# Patient Record
Sex: MALE | Race: WHITE | Employment: OTHER | URBAN - METROPOLITAN AREA
[De-identification: names, ages, dates, MRNs, and addresses within clinical notes are randomized per-mention and may not be internally consistent; named-entity substitution may affect disease eponyms.]

---

## 2017-01-05 NOTE — PERIOP NOTES
[facility]  Preoperative Instructions        Surgery Date 1/6/17          Time of Arrival 1000    1. On the day of your surgery, please report to the Surgical Services Registration Desk and sign in at your designated time. The Surgery Center is located to the right of the Emergency Room. 2. You must have someone with you to drive you home. You should not drive a car for 24 hours following surgery. Please make arrangements for a friend or family member to stay with you for the first 24 hours after your surgery. 3. Do not have anything to eat or drink (including water, gum, mints, coffee, juice) after midnight n/a              . This may not apply to medications prescribed by your physician. Please note special instructions, if applicable. If you are currently taking Plavix, Coumadin, or other blood-thinning agents, contact your surgeon for instructions. 4. We recommend you do not drink any alcoholic beverages for 24 hours before and after your surgery. 5. Have a list of all current medications, including vitamins, herbal supplements and any other over the counter medications. Stop all Aspirin and non-steroidal anti-inflammatory drugs (I.e. Advil, Aleve), as directed by your surgeon's office. Stop all vitamins and herbal supplements seven days prior to your surgery. 6. Wear comfortable clothes. Wear glasses instead of contacts. Do not bring any money or jewelry. Please bring picture ID, insurance card, and any prearranged co-payment or hospital payment. Do not wear make-up, particularly mascara the morning of your surgery. Do not wear nail polish, particularly if you are having foot /hand surgery. Wear your hair loose or down, no ponytails, buns, trevon pins or clips. All body piercings must be removed.   Please shower with antibacterial soap for three consecutive days before and on the morning of surgery, but do not apply any lotions, powders or deodorants after the shower on the day of surgery. Please use a fresh towels after each shower. Please sleep in clean clothes and change bed linens the night before surgery. Please do not shave for 48 hours prior to surgery. Shaving of the face is acceptable. 7. You should understand that if you do not follow these instructions your surgery may be cancelled. If your physical condition changes (I.e. fever, cold or flu) please contact your surgeon as soon as possible. 8. It is important that you be on time. If a situation occurs where you may be late, please call [phone] (OR Holding Area). 9. If you have any questions and or problems, please call [phone] (Pre-admission Testing). 10. Your surgery time may be subject to change. You will receive a phone call the evening prior if your time changes. 11.  If having outpatient surgery, you must have someone to drive you here, stay with you during the duration of your stay, and to drive you home at time of discharge. Special Instructions:    MEDICATIONS TO TAKE THE MORNING OF SURGERY WITH A SIP OF WATER:meds per routine w light breakfast      I understand a pre-operative phone call will be made to verify my surgery time. In the event that I am not available, I give permission for a message to be left on my answering service and/or with another person?   Yes @ [phone]         ___________________      __________   _________    (Signature of Patient)             (Witness)                (Date and Time)

## 2017-01-06 ENCOUNTER — HOSPITAL ENCOUNTER (OUTPATIENT)
Age: 77
Setting detail: OUTPATIENT SURGERY
Discharge: HOME | End: 2017-01-06
Attending: ORTHOPAEDIC SURGERY | Admitting: ORTHOPAEDIC SURGERY
Payer: MEDICARE

## 2017-01-06 VITALS
SYSTOLIC BLOOD PRESSURE: 159 MMHG | TEMPERATURE: 98 F | WEIGHT: 180.34 LBS | HEIGHT: 71 IN | DIASTOLIC BLOOD PRESSURE: 63 MMHG | OXYGEN SATURATION: 99 % | RESPIRATION RATE: 16 BRPM | BODY MASS INDEX: 25.25 KG/M2 | HEART RATE: 72 BPM

## 2017-01-06 PROCEDURE — 77030002916 HC SUT ETHLN J&J -A: Performed by: ORTHOPAEDIC SURGERY

## 2017-01-06 PROCEDURE — 77030003028 HC SUT VCRL J&J -A: Performed by: ORTHOPAEDIC SURGERY

## 2017-01-06 PROCEDURE — 77030018836 HC SOL IRR NACL ICUM -A: Performed by: ORTHOPAEDIC SURGERY

## 2017-01-06 PROCEDURE — 76210000020 HC REC RM PH II FIRST 0.5 HR: Performed by: ORTHOPAEDIC SURGERY

## 2017-01-06 PROCEDURE — 77030006689 HC BLD OPHTH BVR BD -A: Performed by: ORTHOPAEDIC SURGERY

## 2017-01-06 PROCEDURE — 76010000154 HC OR TIME FIRST 0.5 HR: Performed by: ORTHOPAEDIC SURGERY

## 2017-01-06 PROCEDURE — 74011000250 HC RX REV CODE- 250: Performed by: ORTHOPAEDIC SURGERY

## 2017-01-06 PROCEDURE — 77030032490 HC SLV COMPR SCD KNE COVD -B: Performed by: ORTHOPAEDIC SURGERY

## 2017-01-06 RX ORDER — HYDROCODONE BITARTRATE AND ACETAMINOPHEN 5; 325 MG/1; MG/1
1 TABLET ORAL
Qty: 15 TAB | Refills: 0 | Status: SHIPPED | OUTPATIENT
Start: 2017-01-06 | End: 2017-02-23 | Stop reason: ALTCHOICE

## 2017-01-06 NOTE — OP NOTES
[address]   OP NOTE       Name:  Augusta Zarco   MR#:  [id number]   :  1940   Account #:  [de-identified]    Surgery Date:  2017   Date of Adm:  2017       PREOPERATIVE DIAGNOSIS: Left trigger thumb. POSTOPERATIVE DIAGNOSIS: Left trigger thumb. PROCEDURES PERFORMED: Left thumb trigger release. SURGEON: Blake Silva MD    ANESTHESIA: 8 mL total of 1% lidocaine with epinephrine mixed 10:1   with 4% bicarbonate, infiltrated at the surgical site prior to surgery. ESTIMATED BLOOD LOSS: Minimal.    SPECIMENS REMOVED: none    DESCRIPTION OF PROCEDURE: The patient was taken to the   operating room, placed in supine position on the operative table. After   an appropriate time-out confirming the proper patient, procedure and   laterality, the left upper extremity was prepped and draped in standard   sterile fashion. The incision was made over the palmar digital crease of   the thumb. Dissection was taken down to the flexor tendon. The A1   pulley was identified and incised completely. The digital nerves were protected   using Ragnell retractors. There was no significant bleeding during the   case. The wound was irrigated with normal saline. The patient was   able to move the thumb fully through a full range of motion without   triggering. The wound was closed using 5-0 Vicryl Rapide. An   appropriate bandage was applied. COMPLICATIONS: None. CONDITION: Stable. MD Elizabeth Cardona / Belkys Mornoe   D:  2017   11:38   T:  2017   12:22   Job #:  [id number]

## 2017-01-06 NOTE — DISCHARGE INSTRUCTIONS
DISCHARGE SUMMARY from Nurse    The following personal items are in your possession at time of discharge:    Dental Appliances: None  Visual Aid: Glasses (reading)  Hearing Aids/Status: Does not own  Home Medications: None  Jewelry: Necklace  Clothing: Pants, Shirt, Undergarments, Socks, Footwear  Other Valuables: None  Personal Items Sent to Safe: declined          PATIENT INSTRUCTIONS:    After general anesthesia or intravenous sedation, for 24 hours or while taking prescription Narcotics:  · Limit your activities  · Do not drive and operate hazardous machinery  · Do not make important personal or business decisions  · Do  not drink alcoholic beverages  · If you have not urinated within 8 hours after discharge, please contact your surgeon on call. Report the following to your surgeon:  · Excessive pain, swelling, redness or odor of or around the surgical area  · Temperature over 100.5  · Nausea and vomiting lasting longer than 4 hours or if unable to take medications  · Any signs of decreased circulation or nerve impairment to extremity: change in color, persistent  numbness, tingling, coldness or increase pain  · Any questions        What to do at Home:    *  Please give a list of your current medications to your Primary Care Provider. *  Please update this list whenever your medications are discontinued, doses are      changed, or new medications (including over-the-counter products) are added. *  Please carry medication information at all times in case of emergency situations. These are general instructions for a healthy lifestyle:    No smoking/ No tobacco products/ Avoid exposure to second hand smoke    Surgeon General's Warning:  Quitting smoking now greatly reduces serious risk to your health.     Obesity, smoking, and sedentary lifestyle greatly increases your risk for illness    A healthy diet, regular physical exercise & weight monitoring are important for maintaining a healthy lifestyle    You may be retaining fluid if you have a history of heart failure or if you experience any of the following symptoms:  Weight gain of 3 pounds or more overnight or 5 pounds in a week, increased swelling in our hands or feet or shortness of breath while lying flat in bed. Please call your doctor as soon as you notice any of these symptoms; do not wait until your next office visit. Recognize signs and symptoms of STROKE:    F-face looks uneven    A-arms unable to move or move unevenly    S-speech slurred or non-existent    T-time-call 911 as soon as signs and symptoms begin-DO NOT go       Back to bed or wait to see if you get better-TIME IS BRAIN. Warning Signs of HEART ATTACK     Call 911 if you have these symptoms:   Chest discomfort. Most heart attacks involve discomfort in the center of the chest that lasts more than a few minutes, or that goes away and comes back. It can feel like uncomfortable pressure, squeezing, fullness, or pain.  Discomfort in other areas of the upper body. Symptoms can include pain or discomfort in one or both arms, the back, neck, jaw, or stomach.  Shortness of breath with or without chest discomfort.  Other signs may include breaking out in a cold sweat, nausea, or lightheadedness. Don't wait more than five minutes to call 911 - MINUTES MATTER! Fast action can save your life. Calling 911 is almost always the fastest way to get lifesaving treatment. Emergency Medical Services staff can begin treatment when they arrive -- up to an hour sooner than if someone gets to the hospital by car. The discharge information has been reviewed with the patient and caregiver. The patient and caregiver verbalized understanding. Discharge medications reviewed with the patient and caregiver and appropriate educational materials and side effects teaching were provided. A common side effect of anesthesia following surgery is nausea and/or vomiting.  In order to decrease symptoms, it is wise to avoid foods that are high in fat, greasy foods, milk products, and spicy foods for the first 24 hours. Acceptable foods for the first 24 hours following surgery include but are not limited to:     soup   broth    toast    crackers    applesauce    bananas    mashed potatoes,   soft or scrambled eggs   oatmeal    jello    It is important to eat when taking your pain medication. This will help to prevent nausea. If possible, please try to time your meals with your medications. It is very important to stay hydrated following surgery. Sip fluids frequently while awake. Avoid acidic drinks such as citrus juices and soda for 24 hours. Carbonated beverages may cause bloating and gas. Acceptable fluids include:    - water (flavor packets may add variety)  - coffee or tea (in moderation)  - Gatorade  - Curtis-aid  - apple juice  - cranberry juice    You are encouraged to cough and deep breathe every hour when awake. This will help to prevent respiratory complications following anesthesia. You may want to hug a pillow when coughing and sneezing to add additional support to the surgical area and to decrease discomfort if you had abdominal or chest surgery. If you are discharged home with support stockings, you may remove them after 24 hours. Support stockings are used to help prevent blood clots in the legs following surgery. Please take time to review all of your Home Care Instructions and Medication Information sheets provided in your discharge packet. If you have any questions, please contact your surgeons office. Thank you. Hydrocodone/Acetaminophen (By mouth)   Acetaminophen (a-acsb-a-MIN-oh-fen), Hydrocodone Bitartrate (bzu-fvkk-QKK-done bye-TAR-trate)  Treats pain. This medicine contains a narcotic pain reliever.    Brand Name(s):Hycet, Lorcet, Lorcet HD, Lorcet Plus, Lortab 10/325, Lortab 5/325, Lortab 7.5/325, Lortab Elixir, Norco, Verdrocet, Vicodin, Vicodin ES, Vicodin HP, [facility] Clause 5/300   There may be other brand names for this medicine. When This Medicine Should Not Be Used: This medicine is not right for everyone. Do not use it if you had an allergic reaction to acetaminophen, hydrocodone, or other narcotic medicines. How to Use This Medicine:   Tablet, Liquid, Capsule  · Your doctor will tell you how much medicine to use. Do not use more than directed. · Measure the oral liquid medicine with a marked measuring spoon, oral syringe, or medicine cup. · Drink plenty of liquids to help avoid constipation. · Missed dose: Take a dose as soon as you remember. If it is almost time for your next dose, wait until then and take a regular dose. Do not take extra medicine to make up for a missed dose. · Store the medicine in a closed container at room temperature, away from heat, moisture, and direct light. Drugs and Foods to Avoid:   Ask your doctor or pharmacist before using any other medicine, including over-the-counter medicines, vitamins, and herbal products. · Some medicines can affect how hydrocodone/acetaminophen works. Tell your doctor if you are using any of the following:   ¨ An MAO inhibitor  ¨ Medicine to treat depression  · This medicine can intensify the effects of alcohol, sedatives, or tranquilizers. Tell your doctor if you drink alcohol or if you are using any medicine that makes you sleepy, such as allergy medicine or another narcotic pain medicine. Acetaminophen can damage your liver, and your risk is higher if you also drink alcohol. Warnings While Using This Medicine:   · Tell your doctor if you are pregnant or breastfeeding, or if you have lung disease, liver disease, kidney disease, problems urinating, an underactive thyroid, [place] disease, prostate problems, stomach problems, or a history of head injury or brain tumor.   · This medicine may cause the following problems:   ¨ Liver problems  ¨ Serious skin reactions  · This medicine can be habit-forming. Do not use more than your prescribed dose. Call your doctor if you think your medicine is not working. · This medicine may make you dizzy or drowsy. Do not drive or doing anything else that could be dangerous until you know how this medicine affects you. · Too much of this medicine can cause death. Symptoms of an overdose include extreme dizziness or weakness, trouble breathing, slow heartbeat, seizure, and cold, clammy skin. · This medicine contains acetaminophen. Read the labels of all other medicines you are using to see if they also contain acetaminophen, or ask your doctor or pharmacist. Brittany Padgett not use more than 4 grams (4,000 milligrams) total of acetaminophen in one day. · Tell any doctor or dentist who treats you that you are using this medicine. This medicine may affect certain medical test results. · This medicine may cause constipation, especially with long-term use. Ask your doctor if you should use a laxative to prevent and treat constipation. · Keep all medicine out of the reach of children. Never share your medicine with anyone. Possible Side Effects While Using This Medicine:   Call your doctor right away if you notice any of these side effects:  · Allergic reaction: Itching or hives, swelling in your face or hands, swelling or tingling in your mouth or throat, chest tightness, trouble breathing  · Blistering, peeling, red skin rash  · Change in how much or how often you urinate  · Dark urine or pale stools, loss of appetite, stomach pain, yellow skin or eyes  · Extreme weakness, shallow breathing, slow heartbeat, sweating, cold or clammy skin  · Lightheadedness, dizziness, fainting  · Unusual bleeding or bruising  If you notice these less serious side effects, talk with your doctor:   · Constipation, nausea, vomiting  · Tiredness or sleepiness  If you notice other side effects that you think are caused by this medicine, tell your doctor.    Call your doctor for medical advice about side effects. You may report side effects to FDA at 5-847-ZEC-0494  © 2016 [address] is for End User's use only and may not be sold, redistributed or otherwise used for commercial purposes. The above information is an  only. It is not intended as medical advice for individual conditions or treatments. Talk to your doctor, nurse or pharmacist before following any medical regimen to see if it is safe and effective for you.

## 2017-01-06 NOTE — PERIOP NOTES
For dc home. Vswnl. Denies pain, nausea. dsg to [facility] hand d&i. Went over [employer] instructions w/pt and wife including Rx and f/up. Verbalized understanding. dc'd home.

## 2017-01-06 NOTE — IP AVS SNAPSHOT
[address] [facility] 
[phone] Patient: Yan Ortiz MRN: [id number] GBD:[id number] You are allergic to the following Allergen Reactions Bee Sting (Sting, Bee) Anaphylaxis Pcn (Penicillins) Swelling Recent Documentation Height Weight BMI Smoking Status 1.803 m 81.8 kg 25.15 kg/m2 Former Smoker Emergency Contacts Name Discharge Info Relation Home Work Mobile [address] [3] Spouse [3] [phone] About your hospitalization You were admitted on:  January 6, 2017 You last received care in the:  [facility] You were discharged on:  January 6, 2017 Unit phone number:  [phone] Why you were hospitalized Your primary diagnosis was:  Not on File Providers Seen During Your Hospitalizations Provider Role Specialty Primary office phone Corin Diallo MD Attending Provider Orthopedic Surgery [phone] Your Primary Care Physician (PCP) Primary Care Physician Office Phone Office Fax [address] [facility], [facility] [address] [phone] Follow-up Information Follow up With Details Comments Contact Info Mattie Akhtar MD   [address] [facility] [place] [address] [phone] Current Discharge Medication List  
  
START taking these medications Dose & Instructions Dispensing Information Comments Morning Noon Evening Bedtime HYDROcodone-acetaminophen 5-325 mg per tablet Commonly known as:  Ana Maria Arts Your next dose is: Today, Tomorrow Other:  _________ Dose:  1 Tab Take 1 Tab by mouth every four (4) hours as needed for Pain. Max Daily Amount: 6 Tabs. Quantity:  15 Tab Refills:  0 CONTINUE these medications which have NOT CHANGED Dose & Instructions Dispensing Information Comments Morning Noon Evening Bedtime  
 allopurinol 300 mg tablet Commonly known as:  Helane Sizer Your next dose is: Today, Tomorrow Other:  _________ Dose:  300 mg Take 1 Tab by mouth daily. Quantity:  90 Tab Refills:  1  
     
   
   
   
  
 amLODIPine 10 mg tablet Commonly known as:  Lovilia Blade Your next dose is: Today, Tomorrow Other:  _________ One tablet daily Quantity:  90 Tab Refills:  3  
     
   
   
   
  
 aspirin delayed-release 81 mg tablet Your next dose is: Today, Tomorrow Other:  _________ Dose:  81 mg Take 81 mg by mouth daily. Refills:  0  
     
   
   
   
  
 atorvastatin 10 mg tablet Commonly known as:  LIPITOR Your next dose is: Today, Tomorrow Other:  _________ Dose:  10 mg Take 1 Tab by mouth daily. Quantity:  90 Tab Refills:  3  
     
   
   
   
  
 benazepril 40 mg tablet Commonly known as:  LOTENSIN Your next dose is: Today, Tomorrow Other:  _________ TAKE 1 TABLET DAILY Quantity:  90 Tab Refills:  3  
     
   
   
   
  
 hydroCHLOROthiazide 12.5 mg capsule Commonly known as:  Leita Grade Your next dose is: Today, Tomorrow Other:  _________ Dose:  12.5 mg Take 1 Cap by mouth daily. Quantity:  90 Cap Refills:  3  
     
   
   
   
  
 levothyroxine 75 mcg tablet Commonly known as:  SYNTHROID Your next dose is: Today, Tomorrow Other:  _________ Dose:  75 mcg Take 1 Tab by mouth Daily (before breakfast). For thyroid Quantity:  90 Tab Refills:  3  
     
   
   
   
  
 metoprolol succinate 50 mg XL tablet Commonly known as:  TOPROL-XL Your next dose is: Today, Tomorrow Other:  _________ Dose:  50 mg Take 50 mg by mouth two (2) times a day. Refills:  0 Where to Get Your Medications Information on where to get these meds will be given to you by the nurse or doctor. ! Ask your nurse or doctor about these medications HYDROcodone-acetaminophen 5-325 mg per tablet Discharge Instructions DISCHARGE SUMMARY from Nurse The following personal items are in your possession at time of discharge: 
 
Dental Appliances: None Visual Aid: Glasses (reading) Hearing Aids/Status: Does not own Home Medications: None Jewelry: Yaya Amour Clothing: Pants, Shirt, Undergarments, Socks, Footwear Other Valuables: None Personal Items Sent to Safe: declined PATIENT INSTRUCTIONS: 
 
After general anesthesia or intravenous sedation, for 24 hours or while taking prescription Narcotics: · Limit your activities · Do not drive and operate hazardous machinery · Do not make important personal or business decisions · Do  not drink alcoholic beverages · If you have not urinated within 8 hours after discharge, please contact your surgeon on call. Report the following to your surgeon: 
· Excessive pain, swelling, redness or odor of or around the surgical area · Temperature over 100.5 · Nausea and vomiting lasting longer than 4 hours or if unable to take medications · Any signs of decreased circulation or nerve impairment to extremity: change in color, persistent  numbness, tingling, coldness or increase pain · Any questions What to do at Home: *  Please give a list of your current medications to your Primary Care Provider. *  Please update this list whenever your medications are discontinued, doses are 
    changed, or new medications (including over-the-counter products) are added. *  Please carry medication information at all times in case of emergency situations. These are general instructions for a healthy lifestyle: No smoking/ No tobacco products/ Avoid exposure to second hand smoke Surgeon General's Warning:  Quitting smoking now greatly reduces serious risk to your health. Obesity, smoking, and sedentary lifestyle greatly increases your risk for illness A healthy diet, regular physical exercise & weight monitoring are important for maintaining a healthy lifestyle You may be retaining fluid if you have a history of heart failure or if you experience any of the following symptoms:  Weight gain of 3 pounds or more overnight or 5 pounds in a week, increased swelling in our hands or feet or shortness of breath while lying flat in bed. Please call your doctor as soon as you notice any of these symptoms; do not wait until your next office visit. Recognize signs and symptoms of STROKE: 
 
F-face looks uneven A-arms unable to move or move unevenly S-speech slurred or non-existent T-time-call 911 as soon as signs and symptoms begin-DO NOT go Back to bed or wait to see if you get better-TIME IS BRAIN. Warning Signs of HEART ATTACK Call 911 if you have these symptoms: 
? Chest discomfort. Most heart attacks involve discomfort in the center of the chest that lasts more than a few minutes, or that goes away and comes back. It can feel like uncomfortable pressure, squeezing, fullness, or pain. ? Discomfort in other areas of the upper body. Symptoms can include pain or discomfort in one or both arms, the back, neck, jaw, or stomach. ? Shortness of breath with or without chest discomfort. ? Other signs may include breaking out in a cold sweat, nausea, or lightheadedness. Don't wait more than five minutes to call [address]! Fast action can save your life. Calling 911 is almost always the fastest way to get lifesaving treatment. Emergency Medical Services staff can begin treatment when they arrive  up to an hour sooner than if someone gets to the hospital by car.   
 
 
The discharge information has been reviewed with the patient and caregiver. The patient and caregiver verbalized understanding. Discharge medications reviewed with the patient and caregiver and appropriate educational materials and side effects teaching were provided. A common side effect of anesthesia following surgery is nausea and/or vomiting. In order to decrease symptoms, it is wise to avoid foods that are high in fat, greasy foods, milk products, and spicy foods for the first 24 hours. Acceptable foods for the first 24 hours following surgery include but are not limited to: 
 
? soup 
? broth 
?  toast  
? crackers ? applesauce 
? bananas  
? mashed potatoes, 
? soft or scrambled eggs 
? oatmeal 
?  jello It is important to eat when taking your pain medication. This will help to prevent nausea. If possible, please try to time your meals with your medications. It is very important to stay hydrated following surgery. Sip fluids frequently while awake. Avoid acidic drinks such as citrus juices and soda for 24 hours. Carbonated beverages may cause bloating and gas. Acceptable fluids include: 
 
? water (flavor packets may add variety) ? coffee or tea (in moderation) ? Gatorade ? Nava Batty ? apple juice 
? cranberry juice You are encouraged to cough and deep breathe every hour when awake. This will help to prevent respiratory complications following anesthesia. You may want to hug a pillow when coughing and sneezing to add additional support to the surgical area and to decrease discomfort if you had abdominal or chest surgery. If you are discharged home with support stockings, you may remove them after 24 hours. Support stockings are used to help prevent blood clots in the legs following surgery. Please take time to review all of your Home Care Instructions and Medication Information sheets provided in your discharge packet. If you have any questions, please contact your surgeons office. Thank you. Hydrocodone/Acetaminophen (By mouth) Acetaminophen (k-sdwi-i-MIN-oh-fen), Hydrocodone Bitartrate (znd-yebc-EWL-done bye-TAR-trate) Treats pain. This medicine contains a narcotic pain reliever. Brand Name(s):Hycet, Lorcet, Lorcet HD, Lorcet Plus, Lortab 10/325, Lortab 5/325, Lortab 7.5/325, Lortab Elixir, Norco, Verdrocet, Vicodin, Vicodin ES, Vicodin HP, [place] Punt 5/300 There may be other brand names for this medicine. When This Medicine Should Not Be Used: This medicine is not right for everyone. Do not use it if you had an allergic reaction to acetaminophen, hydrocodone, or other narcotic medicines. How to Use This Medicine:  
Tablet, Liquid, Capsule · Your doctor will tell you how much medicine to use. Do not use more than directed. · Measure the oral liquid medicine with a marked measuring spoon, oral syringe, or medicine cup. · Drink plenty of liquids to help avoid constipation. · Missed dose: Take a dose as soon as you remember. If it is almost time for your next dose, wait until then and take a regular dose. Do not take extra medicine to make up for a missed dose. · Store the medicine in a closed container at room temperature, away from heat, moisture, and direct light. Drugs and Foods to Avoid: Ask your doctor or pharmacist before using any other medicine, including over-the-counter medicines, vitamins, and herbal products. · Some medicines can affect how hydrocodone/acetaminophen works. Tell your doctor if you are using any of the following: ¨ An MAO inhibitor ¨ Medicine to treat depression · This medicine can intensify the effects of alcohol, sedatives, or tranquilizers. Tell your doctor if you drink alcohol or if you are using any medicine that makes you sleepy, such as allergy medicine or another narcotic pain medicine. Acetaminophen can damage your liver, and your risk is higher if you also drink alcohol. Warnings While Using This Medicine: · Tell your doctor if you are pregnant or breastfeeding, or if you have lung disease, liver disease, kidney disease, problems urinating, an underactive thyroid, Rui disease, prostate problems, stomach problems, or a history of head injury or brain tumor. · This medicine may cause the following problems:  
¨ Liver problems ¨ Serious skin reactions · This medicine can be habit-forming. Do not use more than your prescribed dose. Call your doctor if you think your medicine is not working. · This medicine may make you dizzy or drowsy. Do not drive or doing anything else that could be dangerous until you know how this medicine affects you. · Too much of this medicine can cause death. Symptoms of an overdose include extreme dizziness or weakness, trouble breathing, slow heartbeat, seizure, and cold, clammy skin. · This medicine contains acetaminophen. Read the labels of all other medicines you are using to see if they also contain acetaminophen, or ask your doctor or pharmacist. Gabbyra Savers not use more than 4 grams (4,000 milligrams) total of acetaminophen in one day. · Tell any doctor or dentist who treats you that you are using this medicine. This medicine may affect certain medical test results. · This medicine may cause constipation, especially with long-term use. Ask your doctor if you should use a laxative to prevent and treat constipation. · Keep all medicine out of the reach of children. Never share your medicine with anyone. Possible Side Effects While Using This Medicine:  
Call your doctor right away if you notice any of these side effects: · Allergic reaction: Itching or hives, swelling in your face or hands, swelling or tingling in your mouth or throat, chest tightness, trouble breathing · Blistering, peeling, red skin rash · Change in how much or how often you urinate · Dark urine or pale stools, loss of appetite, stomach pain, yellow skin or eyes · Extreme weakness, shallow breathing, slow heartbeat, sweating, cold or clammy skin · Lightheadedness, dizziness, fainting · Unusual bleeding or bruising If you notice these less serious side effects, talk with your doctor: · Constipation, nausea, vomiting · Tiredness or sleepiness If you notice other side effects that you think are caused by this medicine, tell your doctor. Call your doctor for medical advice about side effects. You may report side effects to FDA at 9-139-VTA-7628 © 2016 [address] is for End User's use only and may not be sold, redistributed or otherwise used for commercial purposes. The above information is an  only. It is not intended as medical advice for individual conditions or treatments. Talk to your doctor, nurse or pharmacist before following any medical regimen to see if it is safe and effective for you. Discharge Orders None [facility]! Dear Trudy Madden: 
Thank you for requesting a [employer] account. Our records indicate that you already have an active [employer] account. You can access your account anytime at [email or web address] Did you know that you can access your hospital and ER discharge instructions at any time in [employer]? You can also review all of your test results from your hospital stay or ER visit. Additional Information If you have questions, please visit the Frequently Asked Questions section of the [employer] website at [email or web address] Remember, [employer] is NOT to be used for urgent needs. For medical emergencies, dial 911. Now available from your iPhone and Android! General Information Please provide this summary of care documentation to your next provider. Patient Signature:  ____________________________________________________________ Date:  ____________________________________________________________  
  
Heriberto Artist  Provider Signature: ____________________________________________________________ Date:  ____________________________________________________________

## 2017-01-06 NOTE — IP AVS SNAPSHOT
Current Discharge Medication List  
  
Take these medications at their scheduled times Dose & Instructions Dispensing Information Comments Morning Noon Evening Bedtime  
 allopurinol 300 mg tablet Commonly known as:  Ana Zhong Your next dose is: Today, Tomorrow Other:  ____________ Dose:  300 mg Take 1 Tab by mouth daily. Quantity:  90 Tab Refills:  1  
     
   
   
   
  
 aspirin delayed-release 81 mg tablet Your next dose is: Today, Tomorrow Other:  ____________ Dose:  81 mg Take 81 mg by mouth daily. Refills:  0  
     
   
   
   
  
 atorvastatin 10 mg tablet Commonly known as:  LIPITOR Your next dose is: Today, Tomorrow Other:  ____________ Dose:  10 mg Take 1 Tab by mouth daily. Quantity:  90 Tab Refills:  3  
     
   
   
   
  
 hydroCHLOROthiazide 12.5 mg capsule Commonly known as:  Alesia Helder Your next dose is: Today, Tomorrow Other:  ____________ Dose:  12.5 mg Take 1 Cap by mouth daily. Quantity:  90 Cap Refills:  3  
     
   
   
   
  
 levothyroxine 75 mcg tablet Commonly known as:  SYNTHROID Your next dose is: Today, Tomorrow Other:  ____________ Dose:  75 mcg Take 1 Tab by mouth Daily (before breakfast). For thyroid Quantity:  90 Tab Refills:  3  
     
   
   
   
  
 metoprolol succinate 50 mg XL tablet Commonly known as:  TOPROL-XL Your next dose is: Today, Tomorrow Other:  ____________ Dose:  50 mg Take 50 mg by mouth two (2) times a day. Refills:  0 Take these medications as needed Dose & Instructions Dispensing Information Comments Morning Noon Evening Bedtime HYDROcodone-acetaminophen 5-325 mg per tablet Commonly known as:  Low Punt Your next dose is: Today, Tomorrow Other:  ____________ Dose:  1 Tab Take 1 Tab by mouth every four (4) hours as needed for Pain. Max Daily Amount: 6 Tabs. Quantity:  15 Tab Refills:  0 Take these medications as directed Dose & Instructions Dispensing Information Comments Morning Noon Evening Bedtime  
 amLODIPine 10 mg tablet Commonly known as:  Jeanette Gómez Your next dose is: Today, Tomorrow Other:  ____________ One tablet daily Quantity:  90 Tab Refills:  3  
     
   
   
   
  
 benazepril 40 mg tablet Commonly known as:  LOTENSIN Your next dose is: Today, Tomorrow Other:  ____________ TAKE 1 TABLET DAILY Quantity:  90 Tab Refills:  3 Where to Get Your Medications Information about where to get these medications is not yet available ! Ask your nurse or doctor about these medications HYDROcodone-acetaminophen 5-325 mg per tablet

## 2017-01-24 RX ORDER — ALLOPURINOL 300 MG/1
300 TABLET ORAL DAILY
Qty: 30 TAB | Refills: 0 | Status: SHIPPED | OUTPATIENT
Start: 2017-01-24 | End: 2017-01-24 | Stop reason: SDUPTHER

## 2017-01-24 RX ORDER — ALLOPURINOL 300 MG/1
300 TABLET ORAL DAILY
Qty: 90 TAB | Refills: 2 | Status: SHIPPED | OUTPATIENT
Start: 2017-01-24 | End: 2017-08-09 | Stop reason: SDUPTHER

## 2017-02-13 ENCOUNTER — TELEPHONE (OUTPATIENT)
Dept: ENDOCRINOLOGY | Age: 77
End: 2017-02-13

## 2017-02-13 ENCOUNTER — LAB ONLY (OUTPATIENT)
Dept: ENDOCRINOLOGY | Age: 77
End: 2017-02-13

## 2017-02-13 DIAGNOSIS — E03.4 HYPOTHYROIDISM DUE TO ACQUIRED ATROPHY OF THYROID: Primary | ICD-10-CM

## 2017-02-13 NOTE — TELEPHONE ENCOUNTER
----- Message from Atif Pride MD sent at 2/13/2017  7:40 AM EST -----  Regarding: lab only appointment  Mr. Tamanna Zamarripa will be coming in today around 21 [phone] for a labs. He will need TSH, Free T4 and Total T3.     Thanks,    Mygurvinder Roles

## 2017-02-14 LAB
T3 SERPL-MCNC: 108 NG/DL (ref 71–180)
T4 FREE SERPL-MCNC: 1.43 NG/DL (ref 0.82–1.77)
TSH SERPL DL<=0.005 MIU/L-ACNC: 0.46 UIU/ML (ref 0.45–4.5)

## 2017-02-23 ENCOUNTER — OFFICE VISIT (OUTPATIENT)
Dept: INTERNAL MEDICINE CLINIC | Age: 77
End: 2017-02-23

## 2017-02-23 ENCOUNTER — OFFICE VISIT (OUTPATIENT)
Dept: ENDOCRINOLOGY | Age: 77
End: 2017-02-23

## 2017-02-23 VITALS
OXYGEN SATURATION: 96 % | HEART RATE: 70 BPM | RESPIRATION RATE: 14 BRPM | HEIGHT: 71 IN | TEMPERATURE: 97.3 F | DIASTOLIC BLOOD PRESSURE: 59 MMHG | WEIGHT: 182 LBS | BODY MASS INDEX: 25.48 KG/M2 | SYSTOLIC BLOOD PRESSURE: 137 MMHG

## 2017-02-23 VITALS
SYSTOLIC BLOOD PRESSURE: 130 MMHG | BODY MASS INDEX: 25.03 KG/M2 | HEART RATE: 58 BPM | WEIGHT: 178.8 LBS | HEIGHT: 71 IN | DIASTOLIC BLOOD PRESSURE: 60 MMHG

## 2017-02-23 DIAGNOSIS — I10 ESSENTIAL HYPERTENSION: Primary | ICD-10-CM

## 2017-02-23 DIAGNOSIS — E03.4 HYPOTHYROIDISM DUE TO ACQUIRED ATROPHY OF THYROID: ICD-10-CM

## 2017-02-23 DIAGNOSIS — R73.02 GLUCOSE INTOLERANCE (IMPAIRED GLUCOSE TOLERANCE): ICD-10-CM

## 2017-02-23 DIAGNOSIS — M10.079 IDIOPATHIC GOUT OF FOOT, UNSPECIFIED CHRONICITY, UNSPECIFIED LATERALITY: ICD-10-CM

## 2017-02-23 DIAGNOSIS — E78.00 HYPERCHOLESTEROLEMIA: ICD-10-CM

## 2017-02-23 DIAGNOSIS — E03.4 HYPOTHYROIDISM DUE TO ACQUIRED ATROPHY OF THYROID: Primary | ICD-10-CM

## 2017-02-23 RX ORDER — ASPIRIN 81 MG/1
81 TABLET ORAL DAILY
Qty: 90 TAB | Refills: 3
Start: 2017-02-23

## 2017-02-23 NOTE — PROGRESS NOTES
Reviewed record In preparation for visit and have obtained necessary documentation. Has info on advanced directive but has not filled them out. 1. Have you been to the ER, urgent care clinic or hospitalized since your last visit? ED [facility] 1/6/17, 9/7/17, 8/31/16    2. Have you seen or consulted any other health care providers outside of the [facility] since your last visit? Include any pap smears or colon screening.  Dr Conley Sandhoff, Dr Kashif Moya, Dr Stephenie Valle, [facility] Domonique Lan Maintenance reviewed:

## 2017-02-23 NOTE — PROGRESS NOTES
Chief Complaint   Patient presents with    Thyroid Problem     pcp and pharmacy verified   Records since last visit reviewed  History of Present Illness: Kel Meredith is a 68 y.o. 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 At his last visit in August 2016 he was clinically euthyroid on LT4 75mcg daily. His TSH was 0.702 with FT4 of 1.30. We continued him on the LT4 75mcg daily. He contacted me in early February with concerns of decreased appetite, soft stools and 5 pounds weight loss. He notes this was the symptoms he had when he was hyperthyroid previously. We checked his TFTs. His TSH was 0.459 with FT4 of 1.43 and TT3 of 108. I told him to continue the LT4 75mcg daily till we could discuss the issue further at today's visit. Pt notes the symptoms lasted about a weeks and then it went away and normalized all of a sudden. His wife notes that she had put him on \"all sugar free, because he is a sweet eater\". He has gained 3 pounds back. Pt notes his energy is good and he is feeling well overall. Pt denies issues of CP, SOB, palpitations, tremors, diarrhea or constipation, heat or cold intolerance. He notes that he saw his cardiologist and after a treadmill stress test, was told his heart is doing well. Pt notes he is back to going out side cutting wood and doing his yard work. A significant portion of the history was obtained by his wife. Current Outpatient Prescriptions   Medication Sig    allopurinol (ZYLOPRIM) 300 mg tablet Take 1 Tab by mouth daily.  atorvastatin (LIPITOR) 10 mg tablet Take 1 Tab by mouth daily.  benazepril (LOTENSIN) 40 mg tablet TAKE 1 TABLET DAILY    hydroCHLOROthiazide (MICROZIDE) 12.5 mg capsule Take 1 Cap by mouth daily.  amLODIPine (NORVASC) 10 mg tablet One tablet daily    levothyroxine (SYNTHROID) 75 mcg tablet Take 1 Tab by mouth Daily (before breakfast).  For thyroid    metoprolol succinate (TOPROL-XL) 50 mg XL tablet Take 50 mg by mouth two (2) times a day.  aspirin delayed-release 81 mg tablet Take 81 mg by mouth daily. No current facility-administered medications for this visit. Allergies   Allergen Reactions    Bee Sting [Sting, Bee] Anaphylaxis    Pcn [Penicillins] Swelling     Review of Systems:  - Cardiovascular: no chest pain  - Neurological: no tremors  - Integumentary: skin is normal    Physical Examination:  Blood pressure 130/60, pulse (!) 58, height 5' 11\" (1.803 m), weight 178 lb 12.8 oz (81.1 kg). - General: pleasant, no distress, good eye contact   - Neck: no thyromegaly or thyroid bruits  - Cardiovascular: regular, normal rate, nl s1 and s2, no m/r/g   - Integumentary: skin is normal, no edema  - Neurological: reflexes 2+ at biceps, no tremors  - Psychiatric: normal mood and affect    Data Reviewed:   Component      Latest Ref Rng & Units 2/13/2017 2/13/2017 2/13/2017          10:19 AM 10:19 AM 10:19 AM   T3, total      71 - 180 ng/dL   108   T4, Free      0.82 - 1.77 ng/dL  1.43    TSH      0.450 - 4.500 uIU/mL 0.459       Assessment/Plan:   1) Hypothyroidism > Pt's symptoms have improved, I suspect he had a GI virus. Will continue the LT4 75mcg daily. RTC 6 months      Follow-up Disposition:  Return in about 6 months (around 8/23/2017).     Copy sent to:  Dr. Geetha Ojeda

## 2017-02-23 NOTE — PATIENT INSTRUCTIONS
Office visit in 6 months. We will do hemoglobin A1c in the office that day. High Blood Pressure: Care Instructions  Your Care Instructions  If your blood pressure is usually above 140/90, you have high blood pressure, or hypertension. That means the top number is 140 or higher or the bottom number is 90 or higher, or both. Despite what a lot of people think, high blood pressure usually doesn't cause headaches or make you feel dizzy or lightheaded. It usually has no symptoms. But it does increase your risk for heart attack, stroke, and kidney or eye damage. The higher your blood pressure, the more your risk increases. Your doctor will give you a goal for your blood pressure. Your goal will be based on your health and your age. An example of a goal is to keep your blood pressure below 140/90. Lifestyle changes, such as eating healthy and being active, are always important to help lower blood pressure. You might also take medicine to reach your blood pressure goal.  Follow-up care is a key part of your treatment and safety. Be sure to make and go to all appointments, and call your doctor if you are having problems. It's also a good idea to know your test results and keep a list of the medicines you take. How can you care for yourself at home? Medical treatment  · If you stop taking your medicine, your blood pressure will go back up. You may take one or more types of medicine to lower your blood pressure. Be safe with medicines. Take your medicine exactly as prescribed. Call your doctor if you think you are having a problem with your medicine. · Talk to your doctor before you start taking aspirin every day. Aspirin can help certain people lower their risk of a heart attack or stroke. But taking aspirin isn't right for everyone, because it can cause serious bleeding. · See your doctor regularly. You may need to see the doctor more often at first or until your blood pressure comes down.   · If you are taking blood pressure medicine, talk to your doctor before you take decongestants or anti-inflammatory medicine, such as ibuprofen. Some of these medicines can raise blood pressure. · Learn how to check your blood pressure at home. Lifestyle changes  · Stay at a healthy weight. This is especially important if you put on weight around the waist. Losing even 10 pounds can help you lower your blood pressure. · If your doctor recommends it, get more exercise. Walking is a good choice. Bit by bit, increase the amount you walk every day. Try for at least 30 minutes on most days of the week. You also may want to swim, bike, or do other activities. · Avoid or limit alcohol. Talk to your doctor about whether you can drink any alcohol. · Try to limit how much sodium you eat to less than 2,300 milligrams (mg) a day. Your doctor may ask you to try to eat less than 1,500 mg a day. · Eat plenty of fruits (such as bananas and oranges), vegetables, legumes, whole grains, and low-fat dairy products. · Lower the amount of saturated fat in your diet. Saturated fat is found in animal products such as milk, cheese, and meat. Limiting these foods may help you lose weight and also lower your risk for heart disease. · Do not smoke. Smoking increases your risk for heart attack and stroke. If you need help quitting, talk to your doctor about stop-smoking programs and medicines. These can increase your chances of quitting for good. When should you call for help? Call 911 anytime you think you may need emergency care. This may mean having symptoms that suggest that your blood pressure is causing a serious heart or blood vessel problem. Your blood pressure may be over 180/110. For example, call 911 if:  · You have symptoms of a heart attack. These may include:  ¨ Chest pain or pressure, or a strange feeling in the chest.  ¨ Sweating. ¨ Shortness of breath. ¨ Nausea or vomiting.   ¨ Pain, pressure, or a strange feeling in the back, neck, jaw, or upper belly or in one or both shoulders or arms. ¨ Lightheadedness or sudden weakness. ¨ A fast or irregular heartbeat. · You have symptoms of a stroke. These may include:  ¨ Sudden numbness, tingling, weakness, or loss of movement in your face, arm, or leg, especially on only one side of your body. ¨ Sudden vision changes. ¨ Sudden trouble speaking. ¨ Sudden confusion or trouble understanding simple statements. ¨ Sudden problems with walking or balance. ¨ A sudden, severe headache that is different from past headaches. · You have severe back or belly pain. Do not wait until your blood pressure comes down on its own. Get help right away. Call your doctor now or seek immediate care if:  · Your blood pressure is much higher than normal (such as 180/110 or higher), but you don't have symptoms. · You think high blood pressure is causing symptoms, such as:  ¨ Severe headache. ¨ Blurry vision. Watch closely for changes in your health, and be sure to contact your doctor if:  · Your blood pressure measures 140/90 or higher at least 2 times. That means the top number is 140 or higher or the bottom number is 90 or higher, or both. · You think you may be having side effects from your blood pressure medicine. · Your blood pressure is usually normal, but it goes above normal at least 2 times. Where can you learn more? Go to http://hai-shanti.info/. Enter Z520 in the search box to learn more about \"High Blood Pressure: Care Instructions. \"  Current as of: August 8, 2016  Content Version: 11.1  © 2546-4348 [employer]. Care instructions adapted under license by [employer] (which disclaims liability or warranty for this information). If you have questions about a medical condition or this instruction, always ask your healthcare professional. [address] any warranty or liability for your use of this information.

## 2017-02-23 NOTE — PROGRESS NOTES
HISTORY OF PRESENT ILLNESS  Kel Meredith is a 68 y.o. male. HPI  He presents for follow up of hypertension, hyperlipidemia, Atrial Fibrillation and glucose intolerance. Diet and Lifestyle: not attempting to follow a low fat, low cholesterol diet, not attempting to follow a low sodium diet, follows a diabetic diet regularly, exercises regularly, nonsmoker   Medication compliance: compliant all of the time  Medication side effects: none  Home BP Monitoring: is well controlled at home, ranging 120-130's/60's. Cardiovascular ROS:  He denies palpitations, orthopnea, exertional chest pressure/discomfort, claudication, lower extremity edema, dyspnea on exertion, dizziness       He presents for follow up of hypothyroidism. In July 2015 he was found to be hyperthyroid as a result of amiodarone therapy. Amiodarone was discontinued. He was treated with methimazole and prednisone. In April 2016 he was found to be hypothyroid and levothyroxine was started. Patient denies weight changes, heat / cold intolerance, change in energy level. He presents for follow-up of gout (feet). Past gout history: acute gouty arthritis. Current gout treatment: allopurinol (Zyloprim). Side effects of current therapy: none. Progress since last visit: gouty attacks are acute. He reports no acute gout attacks since last clinic visit. [place] He is attempting to avoid high purine foods and alcohol.         Patient Active Problem List   Diagnosis Code    Atrial fibrillation, currently in sinus rhythm (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    Advance directive discussed with patient Z70.80    Combined forms of 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 eye H25.811    Combined forms of age-related cataract of left eye H25.812     Past Medical History:   Diagnosis Date    Afib ([facility] [address]     Arthritis     L hand and bilat knees    Glucose intolerance (pre-diabetes)     Gout     Hypercholesterolemia     Hypertension     Ill-defined condition     gout    Nephrolithiasis     Thyroid disease     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     Past Surgical History:   Procedure Laterality Date    HX CATARACT REMOVAL Right 08/31/2016    HX HEENT      nose polyps    HX ORTHOPAEDIC      L elbow surgery     Social History     Social History    Marital status:      Spouse name: N/A    Number of children: N/A    Years of education: N/A     Social History Main Topics    Smoking status: Former Smoker     Packs/day: 1.00     Years: 16.00     Quit date: 3/12/1988    Smokeless tobacco: Never Used    Alcohol use No    Drug use: Yes     Special: Prescription, Marijuana      Comment: \"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 90 Tab 2    atorvastatin (LIPITOR) 10 mg tablet Take 1 Tab by mouth daily. 90 Tab 3    benazepril (LOTENSIN) 40 mg tablet TAKE 1 TABLET DAILY 90 Tab 3    hydroCHLOROthiazide (MICROZIDE) 12.5 mg capsule Take 1 Cap by mouth daily. 90 Cap 3    amLODIPine (NORVASC) 10 mg tablet One tablet daily 90 Tab 3    levothyroxine (SYNTHROID) 75 mcg tablet Take 1 Tab by mouth Daily (before breakfast). For thyroid 90 Tab 3    metoprolol succinate (TOPROL-XL) 50 mg XL tablet Take 50 mg by mouth two (2) times a day.       aspirin delayed-release 81 mg tablet Take 81 mg by mouth daily. Review of Systems   Constitutional: Negative for malaise/fatigue and weight loss. Gastrointestinal: Negative for constipation, diarrhea and heartburn. Musculoskeletal: Positive for joint pain (left knee and shoulder). Negative for back pain. Neurological: Negative for dizziness, tingling and focal weakness. Visit Vitals    /59 (BP 1 Location: Left arm, BP Patient Position: Sitting)    Pulse 70    Temp 97.3 °F (36.3 °C) (Oral)    Resp 14    Ht 5' 11\" (1.803 m)    Wt 182 lb (82.6 kg)    SpO2 96%    BMI 25.38 kg/m2     Physical Exam   Constitutional: He is oriented to person, place, and time. He appears well-developed and well-nourished. HENT:   Head: Normocephalic and atraumatic. Eyes: Conjunctivae are normal. Pupils are equal, round, and reactive to light. Neck: Neck supple. Carotid bruit is not present. No thyromegaly present. Cardiovascular: Normal rate and regular rhythm. PMI is not displaced. Exam reveals no gallop. Murmur heard. Systolic (at base) murmur is present with a grade of 2/6    No diastolic murmur is present   Pulses:       Dorsalis pedis pulses are 2+ on the right side, and 2+ on the left side. Posterior tibial pulses are 2+ on the right side, and 2+ on the left side. Pulmonary/Chest: Effort normal. He has no wheezes. He has no rhonchi. He has no rales. Abdominal: Soft. Normal appearance. He exhibits no abdominal bruit and no mass. There is no hepatosplenomegaly. There is no tenderness. Musculoskeletal: He exhibits no edema. Lymphadenopathy:     He has no cervical adenopathy. Right: No supraclavicular adenopathy present. Left: No inguinal and no supraclavicular adenopathy present. Neurological: He is alert and oriented to person, place, and time. No sensory deficit. Skin: Skin is warm, dry and intact. No rash noted.    Psychiatric: He has a normal mood and affect. His behavior is normal.   Nursing note and vitals reviewed. Results for orders placed or performed in visit on 02/13/17   T3 TOTAL   Result Value Ref Range    T3, total 108 71 - 180 ng/dL   T4, FREE   Result Value Ref Range    T4, Free 1.43 0.82 - 1.77 ng/dL   TSH 3RD GENERATION   Result Value Ref Range    TSH 0.459 0.450 - 4.500 uIU/mL     Lab Results   Component Value Date/Time    Cholesterol, total 116 12/28/2016 12:00 AM    HDL Cholesterol 30 12/28/2016 12:00 AM    LDL, calculated 60 12/28/2016 12:00 AM    VLDL, calculated 26 12/28/2016 12:00 AM    Triglyceride 129 12/28/2016 12:00 AM    CHOL/HDL Ratio 5.8 05/14/2010 04:45 PM     Lab Results   Component Value Date/Time    Sodium 140 12/28/2016 12:00 AM    Potassium 4.4 12/28/2016 12:00 AM    Chloride 103 12/28/2016 12:00 AM    CO2 23 12/28/2016 12:00 AM    Anion gap 9 04/17/2016 06:19 PM    Glucose 109 12/28/2016 12:00 AM    BUN 17 12/28/2016 12:00 AM    Creatinine 1.08 12/28/2016 12:00 AM    BUN/Creatinine ratio 16 12/28/2016 12:00 AM    GFR est AA 77 12/28/2016 12:00 AM    GFR est non-AA 66 12/28/2016 12:00 AM    Calcium 9.6 12/28/2016 12:00 AM    Bilirubin, total 0.3 12/28/2016 12:00 AM    AST (SGOT) 24 12/28/2016 12:00 AM    Alk. phosphatase 101 12/28/2016 12:00 AM    Protein, total 7.6 12/28/2016 12:00 AM    Albumin 3.8 12/28/2016 12:00 AM    Globulin 4.3 04/17/2016 06:19 PM    A-G Ratio 1.0 12/28/2016 12:00 AM    ALT (SGPT) 16 12/28/2016 12:00 AM     Lab Results   Component Value Date/Time    Hemoglobin A1c 6.3 12/28/2016 12:00 AM       ASSESSMENT and PLAN    ICD-10-CM ICD-9-CM    1. Essential hypertension I10 401.9    2. Hypercholesterolemia E78.00 272.0    3. Glucose intolerance (impaired glucose tolerance) R73.02 790.22    4. 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  Idiopathic gout of foot, unspecified chronicity, unspecified laterality M10.079 274.00          Essential hypertension  Hypertension is controlled    Hypercholesterolemia  Hyperlipidemia is controlled    Glucose intolerance (impaired glucose tolerance)    Hypothyroidism due to acquired atrophy of thyroid    Idiopathic gout of foot, unspecified chronicity, unspecified laterality    Other orders  -     aspirin delayed-release 81 mg tablet; Take 1 Tab by mouth daily. Follow-up Disposition:  Return in about 6 months (around 8/23/2017) for HTN, chol, gluc, POC A1c.  lab results and schedule of future lab studies reviewed with patient  reviewed diet, exercise and weight control  cardiovascular risk and specific lipid/LDL goals reviewed  I have discussed the diagnosis with the patient and the intended plan as seen in the above orders. Patient is in agreement. The patient has received an after-visit summary and questions were answered concerning future plans. I have discussed medication side effects and warnings with the patient as well.

## 2017-02-23 NOTE — MR AVS SNAPSHOT
Visit Information Date & Time Provider Department Dept. Phone Encounter #  
 2/23/2017  3:30 PM Silas Jamison MD [facility] [phone] [id number] Follow-up Instructions Return in about 6 months (around 8/23/2017) for HTN, chol, gluc, POC A1c. Your Appointments 8/23/2017 11:10 AM  
Follow Up with Saulo Casas MD  
[facility] Diabetes and Endocrinology [facility] Appt Note: 6 MONTH F/U  
 [address] [address] [address] [facility] Upcoming Health Maintenance Date Due  
 MEDICARE YEARLY EXAM 7/6/2017 GLAUCOMA SCREENING Q2Y 7/11/2018 COLONOSCOPY 11/13/2024 DTaP/Tdap/Td series (2 - Td) 11/18/2024 Allergies as of 2/23/2017  Review Complete On: 2/23/2017 By: Silas Jamison MD  
  
 Severity Noted Reaction Type Reactions Bee Sting [Sting, Bee] High 03/12/2010   Systemic Anaphylaxis Pcn [Penicillins]  04/01/2010    Swelling Current Immunizations  Reviewed on 2/23/2017 Name Date Influenza High Dose Vaccine PF 10/17/2016, 9/21/2015, 10/9/2014 Influenza Vaccine 10/8/2013 Influenza Vaccine Split 9/27/2011 Pneumococcal Conjugate (PCV-13) 7/5/2016 Pneumococcal Vaccine (Unspecified Type) 5/11/2012 TD Vaccine 1/1/1999 Tdap 11/18/2014 Reviewed by Magdaleno Goodpasture, LPN on 9/45/0278 at  3:13 PM  
You Were Diagnosed With   
  
 Codes Comments Essential hypertension    -  Primary ICD-10-CM: I10 
ICD-9-CM: 401.9 Hypercholesterolemia     ICD-10-CM: E78.00 ICD-9-CM: 272.0 Glucose intolerance (impaired glucose tolerance)     ICD-10-CM: R73.02 
ICD-9-CM: 790.22 Hypothyroidism due to acquired atrophy of thyroid     ICD-10-CM: E03.4 ICD-9-CM: 244.8, 246.8 Idiopathic gout of foot, unspecified chronicity, unspecified laterality     ICD-10-CM: M10.079 ICD-9-CM: 274.00   
  
 Vitals BP  
  
  
  
  
  
 137/59 (BP 1 Location: Left arm, BP Patient Position: Sitting) BMI and BSA Data Body Mass Index Body Surface Area  
 25.38 kg/m 2 2.03 m 2 Preferred Pharmacy Pharmacy Name Phone [facility] [place], [place] - [address] [facility] [address] [phone] Your Updated Medication List  
  
   
This list is accurate as of: 2/23/17  4:13 PM.  Always use your most recent med list.  
  
  
  
  
 allopurinol 300 mg tablet Commonly known as:  Clementeen Hickey Take 1 Tab by mouth daily. amLODIPine 10 mg tablet Commonly known as:  Gertrude Fraction One tablet daily  
  
 aspirin delayed-release 81 mg tablet Take 1 Tab by mouth daily. atorvastatin 10 mg tablet Commonly known as:  LIPITOR Take 1 Tab by mouth daily. benazepril 40 mg tablet Commonly known as:  LOTENSIN  
TAKE 1 TABLET DAILY  
  
 hydroCHLOROthiazide 12.5 mg capsule Commonly known as:  Amaryllis Cross Take 1 Cap by mouth daily. levothyroxine 75 mcg tablet Commonly known as:  SYNTHROID Take 1 Tab by mouth Daily (before breakfast). For thyroid  
  
 metoprolol succinate 50 mg XL tablet Commonly known as:  TOPROL-XL Take 50 mg by mouth two (2) times a day. Follow-up Instructions Return in about 6 months (around 8/23/2017) for HTN, chol, gluc, POC A1c. Patient Instructions Office visit in 6 months. We will do hemoglobin A1c in the office that day. High Blood Pressure: Care Instructions Your Care Instructions If your blood pressure is usually above 140/90, you have high blood pressure, or hypertension. That means the top number is 140 or higher or the bottom number is 90 or higher, or both. Despite what a lot of people think, high blood pressure usually doesn't cause headaches or make you feel dizzy or lightheaded. It usually has no symptoms.  But it does increase your risk for heart attack, stroke, and kidney or eye damage. The higher your blood pressure, the more your risk increases. Your doctor will give you a goal for your blood pressure. Your goal will be based on your health and your age. An example of a goal is to keep your blood pressure below 140/90. Lifestyle changes, such as eating healthy and being active, are always important to help lower blood pressure. You might also take medicine to reach your blood pressure goal. 
Follow-up care is a key part of your treatment and safety. Be sure to make and go to all appointments, and call your doctor if you are having problems. It's also a good idea to know your test results and keep a list of the medicines you take. How can you care for yourself at home? Medical treatment · If you stop taking your medicine, your blood pressure will go back up. You may take one or more types of medicine to lower your blood pressure. Be safe with medicines. Take your medicine exactly as prescribed. Call your doctor if you think you are having a problem with your medicine. · Talk to your doctor before you start taking aspirin every day. Aspirin can help certain people lower their risk of a heart attack or stroke. But taking aspirin isn't right for everyone, because it can cause serious bleeding. · See your doctor regularly. You may need to see the doctor more often at first or until your blood pressure comes down. · If you are taking blood pressure medicine, talk to your doctor before you take decongestants or anti-inflammatory medicine, such as ibuprofen. Some of these medicines can raise blood pressure. · Learn how to check your blood pressure at home. Lifestyle changes · Stay at a healthy weight. This is especially important if you put on weight around the waist. Losing even 10 pounds can help you lower your blood pressure. · If your doctor recommends it, get more exercise. Walking is a good choice. Bit by bit, increase the amount you walk every day.  Try for at least 30 minutes on most days of the week. You also may want to swim, bike, or do other activities. · Avoid or limit alcohol. Talk to your doctor about whether you can drink any alcohol. · Try to limit how much sodium you eat to less than 2,300 milligrams (mg) a day. Your doctor may ask you to try to eat less than 1,500 mg a day. · Eat plenty of fruits (such as bananas and oranges), vegetables, legumes, whole grains, and low-fat dairy products. · Lower the amount of saturated fat in your diet. Saturated fat is found in animal products such as milk, cheese, and meat. Limiting these foods may help you lose weight and also lower your risk for heart disease. · Do not smoke. Smoking increases your risk for heart attack and stroke. If you need help quitting, talk to your doctor about stop-smoking programs and medicines. These can increase your chances of quitting for good. When should you call for help? Call 911 anytime you think you may need emergency care. This may mean having symptoms that suggest that your blood pressure is causing a serious heart or blood vessel problem. Your blood pressure may be over 180/110. For example, call 911 if: 
· You have symptoms of a heart attack. These may include: ¨ Chest pain or pressure, or a strange feeling in the chest. 
¨ Sweating. ¨ Shortness of breath. ¨ Nausea or vomiting. ¨ Pain, pressure, or a strange feeling in the back, neck, jaw, or upper belly or in one or both shoulders or arms. ¨ Lightheadedness or sudden weakness. ¨ A fast or irregular heartbeat. · You have symptoms of a stroke. These may include: 
¨ Sudden numbness, tingling, weakness, or loss of movement in your face, arm, or leg, especially on only one side of your body. ¨ Sudden vision changes. ¨ Sudden trouble speaking. ¨ Sudden confusion or trouble understanding simple statements. ¨ Sudden problems with walking or balance. ¨ A sudden, severe headache that is different from past headaches. · You have severe back or belly pain. Do not wait until your blood pressure comes down on its own. Get help right away. Call your doctor now or seek immediate care if: 
· Your blood pressure is much higher than normal (such as 180/110 or higher), but you don't have symptoms. · You think high blood pressure is causing symptoms, such as: ¨ Severe headache. ¨ Blurry vision. Watch closely for changes in your health, and be sure to contact your doctor if: 
· Your blood pressure measures 140/90 or higher at least 2 times. That means the top number is 140 or higher or the bottom number is 90 or higher, or both. · You think you may be having side effects from your blood pressure medicine. · Your blood pressure is usually normal, but it goes above normal at least 2 times. Where can you learn more? Go to http://hai-shanti.info/. Enter J784 in the search box to learn more about \"High Blood Pressure: Care Instructions. \" Current as of: August 8, 2016 Content Version: 11.1 © 5920-9908 Flexenclosure. Care instructions adapted under license by [employer] (which disclaims liability or warranty for this information). If you have questions about a medical condition or this instruction, always ask your healthcare professional. [address] any warranty or liability for your use of this information. Introducing [facility] & HEALTH SERVICES! Dear Amanuel April: 
Thank you for requesting a eflow account. Our records indicate that you already have an active eflow account. You can access your account anytime at [email or web address] Did you know that you can access your hospital and ER discharge instructions at any time in eflow? You can also review all of your test results from your hospital stay or ER visit. Additional Information If you have questions, please visit the Frequently Asked Questions section of the Geos[employer] website at [email or web address] Remember, Geosophic is NOT to be used for urgent needs. For medical emergencies, dial 911. Now available from your iPhone and Android! Please provide this summary of care documentation to your next provider. Your primary care clinician is listed as Mattie Akhtar. If you have any questions after today's visit, please call [phone].

## 2017-04-28 RX ORDER — LEVOTHYROXINE SODIUM 75 UG/1
75 TABLET ORAL
Qty: 90 TAB | Refills: 3 | Status: SHIPPED | OUTPATIENT
Start: 2017-04-28 | End: 2017-08-09 | Stop reason: SDUPTHER

## 2017-04-28 NOTE — TELEPHONE ENCOUNTER
Patient's wife, Kt Orellana, called to talk to you about sending a refill on her 's Levothyroxine. She can be reached at:  [phone].       [facility]    Levothyroxine  (90-day supply)

## 2017-04-28 NOTE — TELEPHONE ENCOUNTER
Last OV: Assessment/Plan:   1) Hypothyroidism > Pt's symptoms have improved, I suspect he had a GI virus.  Will continue the LT4 75mcg daily.   .

## 2017-05-30 ENCOUNTER — OFFICE VISIT (OUTPATIENT)
Dept: INTERNAL MEDICINE CLINIC | Age: 77
End: 2017-05-30

## 2017-05-30 VITALS
SYSTOLIC BLOOD PRESSURE: 136 MMHG | HEIGHT: 71 IN | OXYGEN SATURATION: 95 % | BODY MASS INDEX: 24.78 KG/M2 | WEIGHT: 177 LBS | HEART RATE: 50 BPM | DIASTOLIC BLOOD PRESSURE: 60 MMHG | TEMPERATURE: 98.4 F | RESPIRATION RATE: 16 BRPM

## 2017-05-30 DIAGNOSIS — B34.9 VIRAL INFECTION: Primary | ICD-10-CM

## 2017-05-30 NOTE — PROGRESS NOTES
CC:  Chief Complaint   Patient presents with    Other     fever/started sunday    Decreased Appetite    Fatigue    Muscle Pain    Headache       HISTORY OF PRESENT ILLNESS  Andi Iglesias is a 68 y.o. male. Accompanied by his wife. He complains of 2 day history of fevers, chills, fatigue, decreased appetite, muscle aches especially at shoulders, pressure behind his eyes, headaches, and occasional non-productive cough. Temp of 102.2 F yesterday. Denies sore throat, hoarseness, sinus congestion, ear pains, dyspnea, wheezing, hemoptysis, chest pain, abdominal pain, nausea, vomiting, and diarrhea. Treatment to date: Tylenol. Patient reports no known ill contacts, but was exposed to a lot of people at a [employer] yard sale a week ago. Patient Active Problem List   Diagnosis Code    Atrial fibrillation, currently in sinus rhythm ([facility]) Z86.79    Hypercholesterolemia E78.00    Nephrolithiasis N20.0    Gout M10.9    Glucose intolerance (impaired glucose tolerance) R73.02    Abnormal LFTs R79.89    Essential hypertension I10    Hypothyroidism due to acquired atrophy of thyroid E03.4    Advance directive discussed with patient Z70.80    Combined forms of 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 eye H25.811    Combined forms of age-related cataract of left eye H25.812     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  For thyroid 90 Tab 3    amLODIPine (NORVASC) 10 mg tablet One tablet daily 90 Tab 1    atorvastatin (LIPITOR) 10 mg tablet Take 1 Tab by mouth daily. 90 Tab 1    aspirin delayed-release 81 mg tablet Take 1 Tab by mouth daily. 90 Tab 3    allopurinol (ZYLOPRIM) 300 mg tablet Take 1 Tab by mouth daily. 90 Tab 2    benazepril (LOTENSIN) 40 mg tablet TAKE 1 TABLET DAILY 90 Tab 3    hydroCHLOROthiazide (MICROZIDE) 12.5 mg capsule Take 1 Cap by mouth daily. 90 Cap 3    metoprolol succinate (TOPROL-XL) 50 mg XL tablet Take 50 mg by mouth two (2) times a day. PHYSICAL EXAM  Visit Vitals    /60 (BP 1 Location: Left arm, BP Patient Position: Sitting)    Pulse (!) 50    Temp 98.4 °F (36.9 °C) (Oral)    Resp 16    Ht 5' 11\" (1.803 m)    Wt 177 lb (80.3 kg)    SpO2 95%    BMI 24.69 kg/m2       General: Well-developed and well-nourished, no distress. HEENT:  Head normocephalic/atraumatic, no scleral icterus or conjunctival injection. Nasal mucosa slightly edematous. Oropharynx mildly edematous with no erythema. No sinus tenderness. TM's and ear canals normal bilaterally. Neck: Supple. No lymphadenopathy. Lungs:  Clear to ausculation bilaterally. Good air movement. Heart:  Bradycardic, regular rhythm, normal S1 and S2, no murmur, gallop, or rub  Extremities: No clubbing, cyanosis, or edema. Neurological: Alert and oriented. Psychiatric: Normal mood and affect. Behavior is normal.         ASSESSMENT AND PLAN    ICD-10-CM ICD-9-CM    1. Viral infection B34.9 079.99        Tremaine Balderrama was seen today for other, decreased appetite, fatigue, muscle pain and headache. Diagnoses and all orders for this visit:    Viral infection  Increase fluid (water or Gatorade) intake and rest. Continue Tylenol as needed for fevers and headaches. Return to clinic if no improvement in symptoms within the next  5 days. Follow-up Disposition:  Return if symptoms worsen or fail to improve, for Schedule 6 mon follow up appointment with Dr. Candace Newton around 8/23/17.     Provided patient and/or family with advanced directive information and answered pertinent questions. Encouraged patient to provide a copy of advanced directive to the office when available. I have discussed the diagnosis with the patient and the intended plan as seen in the above orders. Patient is in agreement. The patient has received an after-visit summary and questions were answered concerning future plans. I have discussed medication side effects and warnings with the patient as well.

## 2017-05-30 NOTE — MR AVS SNAPSHOT
Visit Information Date & Time Provider Department Dept. Phone Encounter #  
 5/30/2017 10:30 AM Clemente Martinez MD [facility] [phone] [id number] Follow-up Instructions Return if symptoms worsen or fail to improve, for Schedule 6 mon follow up appointment with Dr. Vickie River around 8/23/17. Your Appointments 8/23/2017 11:10 AM  
Follow Up with Rodolfo Alarcon MD  
[facility] Diabetes and Endocrinology [address] [facility]) Appt Note: 6 MONTH F/U  
 [address] [phone] [address] [facility] Upcoming Health Maintenance Date Due  
 MEDICARE YEARLY EXAM 7/6/2017 INFLUENZA AGE 9 TO ADULT 8/1/2017 GLAUCOMA SCREENING Q2Y 7/11/2018 COLONOSCOPY 11/13/2024 DTaP/Tdap/Td series (2 - Td) 11/18/2024 Allergies as of 5/30/2017  Review Complete On: 5/30/2017 By: Clemente Martinez MD  
  
 Severity Noted Reaction Type Reactions Bee Sting [Sting, Bee] High 03/12/2010   Systemic Anaphylaxis Pcn [Penicillins]  04/01/2010    Swelling Current Immunizations  Reviewed on 2/23/2017 Name Date Influenza High Dose Vaccine PF 10/17/2016, 9/21/2015, 10/9/2014 Influenza Vaccine 10/8/2013 Influenza Vaccine Split 9/27/2011 Pneumococcal Conjugate (PCV-13) 7/5/2016 Pneumococcal Vaccine (Unspecified Type) 5/11/2012 TD Vaccine 1/1/1999 Tdap 11/18/2014 Not reviewed this visit You Were Diagnosed With   
  
 Codes Comments Viral infection    -  Primary ICD-10-CM: B34.9 ICD-9-CM: 079.99 Vitals BP Pulse Temp Resp Height(growth percentile) Weight(growth percentile) 136/60 (BP 1 Location: Left arm, BP Patient Position: Sitting) (!) 50 98.4 °F (36.9 °C) (Oral) 16 5' 11\" (1.803 m) 177 lb (80.3 kg) SpO2 BMI Smoking Status 95% 24.69 kg/m2 Former Smoker Vitals History BMI and BSA Data Body Mass Index Body Surface Area  
 24.69 kg/m 2 2.01 m 2 Preferred Pharmacy Pharmacy Name Phone [facility] [place], [place] - [address] [place] [address] [phone] Your Updated Medication List  
  
   
This list is accurate as of: 5/30/17 11:02 AM.  Always use your most recent med list.  
  
  
  
  
 allopurinol 300 mg tablet Commonly known as:  Carito Padmini Take 1 Tab by mouth daily. amLODIPine 10 mg tablet Commonly known as:  Finney Cradle One tablet daily  
  
 aspirin delayed-release 81 mg tablet Take 1 Tab by mouth daily. atorvastatin 10 mg tablet Commonly known as:  LIPITOR Take 1 Tab by mouth daily. benazepril 40 mg tablet Commonly known as:  LOTENSIN  
TAKE 1 TABLET DAILY  
  
 hydroCHLOROthiazide 12.5 mg capsule Commonly known as:  Velton Shadow Take 1 Cap by mouth daily. levothyroxine 75 mcg tablet Commonly known as:  SYNTHROID Take 1 Tab by mouth Daily (before breakfast). For thyroid  
  
 metoprolol succinate 50 mg XL tablet Commonly known as:  TOPROL-XL Take 50 mg by mouth two (2) times a day. Follow-up Instructions Return if symptoms worsen or fail to improve, for Schedule 6 mon follow up appointment with Dr. Candace Newton around 8/23/17. Patient Instructions Viral Infections: Care Instructions Your Care Instructions You don't feel well, but it's not clear what's causing it. You may have a viral infection. Viruses cause many illnesses, such as the common cold, influenza, fever, rashes, and the diarrhea, nausea, and vomiting that are often called \"stomach flu. \" You may wonder if antibiotic medicines could make you feel better. But antibiotics only treat infections caused by bacteria. They don't work on viruses. The good news is that viral infections usually aren't serious. Most will go away in a few days without medical treatment.  In the meantime, there are a few things you can do to make yourself more comfortable. Follow-up care is a key part of your treatment and safety. Be sure to make and go to all appointments, and call your doctor if you are having problems. It's also a good idea to know your test results and keep a list of the medicines you take. How can you care for yourself at home? · Get plenty of rest if you feel tired. · Take an over-the-counter pain medicine if needed, such as acetaminophen (Tylenol), ibuprofen (Advil, Motrin), or naproxen (Aleve). Read and follow all instructions on the label. · Be careful when taking over-the-counter cold or flu medicines and Tylenol at the same time. Many of these medicines have acetaminophen, which is Tylenol. Read the labels to make sure that you are not taking more than the recommended dose. Too much acetaminophen (Tylenol) can be harmful. · Drink plenty of fluids, enough so that your urine is light yellow or clear like water. If you have kidney, heart, or liver disease and have to limit fluids, talk with your doctor before you increase the amount of fluids you drink. · Stay home from work, school, and other public places while you have a fever. When should you call for help? Call 911 anytime you think you may need emergency care. For example, call if: 
· You have severe trouble breathing. · You passed out (lost consciousness). Call your doctor now or seek immediate medical care if: 
· You seem to be getting much sicker. · You have a new or higher fever. · You have blood in your stools. · You have new belly pain, or your pain gets worse. · You have a new rash. Watch closely for changes in your health, and be sure to contact your doctor if: 
· You start to get better and then get worse. · You do not get better as expected. Where can you learn more? Go to http://hai-shanti.info/. Enter O158 in the search box to learn more about \"Viral Infections: Care Instructions. \" 
 Current as of: May 24, 2016 Content Version: 11.2 © 5038-4314 [employer] Labs, YepLike!. Care instructions adapted under license by [employer] P[employer] (which disclaims liability or warranty for this information). If you have questions about a medical condition or this instruction, always ask your healthcare professional. [address] any warranty or liability for your use of this information. Introducing [facility] & HEALTH SERVICES! Dear Angus Sheets: 
Thank you for requesting a Complex Media account. Our records indicate that you already have an active Complex Media account. You can access your account anytime at [email or web address] Did you know that you can access your hospital and ER discharge instructions at any time in Complex Media? You can also review all of your test results from your hospital stay or ER visit. Additional Information If you have questions, please visit the Frequently Asked Questions section of the Complex Media website at [email or web address] Remember, Complex Media is NOT to be used for urgent needs. For medical emergencies, dial 911. Now available from your iPhone and Android! Please provide this summary of care documentation to your next provider. Your primary care clinician is listed as Kelly Penn. If you have any questions after today's visit, please call [phone].

## 2017-05-30 NOTE — PATIENT INSTRUCTIONS

## 2017-05-30 NOTE — PROGRESS NOTES
Reviewed record  In preparation for visit and have obtained necessary documentation. 1. Have you been to the ER, urgent care clinic since your last visit? Hospitalized since your last visit?no  2. Have you seen or consulted any other health care providers outside of the [address] [place] [address] since your last visit? Include any pap smears or colon screening. No  Patient has been given information on advanced directives at a previous visit.

## 2017-07-14 ENCOUNTER — LAB ONLY (OUTPATIENT)
Dept: ENDOCRINOLOGY | Age: 77
End: 2017-07-14

## 2017-07-14 ENCOUNTER — TELEPHONE (OUTPATIENT)
Dept: ENDOCRINOLOGY | Age: 77
End: 2017-07-14

## 2017-07-14 DIAGNOSIS — E03.4 HYPOTHYROIDISM DUE TO ACQUIRED ATROPHY OF THYROID: Primary | ICD-10-CM

## 2017-07-14 NOTE — TELEPHONE ENCOUNTER
Called and spoke with Tameka Heredia.  Patient would like to come in for thyroid labs this morning at 11am.

## 2017-07-14 NOTE — TELEPHONE ENCOUNTER
Patient's wife, Florin Gil, called to say that her  has no appetite, has lost a lot of weight and is very fatigued all the time. He is not due to come back until August.  Florin Gil would like a call back at:  [phone].

## 2017-07-15 LAB
T3 SERPL-MCNC: 115 NG/DL (ref 71–180)
T4 FREE SERPL-MCNC: 1.43 NG/DL (ref 0.82–1.77)
TSH SERPL DL<=0.005 MIU/L-ACNC: 1.33 UIU/ML (ref 0.45–4.5)

## 2017-08-09 RX ORDER — LEVOTHYROXINE SODIUM 75 UG/1
75 TABLET ORAL
Qty: 90 TAB | Refills: 3 | Status: SHIPPED | OUTPATIENT
Start: 2017-08-09 | End: 2017-08-23 | Stop reason: SDUPTHER

## 2017-08-09 NOTE — TELEPHONE ENCOUNTER
From: Gaye Grimes  To: Krishna Pastrana MD  Sent: 8/9/2017 9:35 AM EDT  Subject: Medication Renewal Request    Original authorizing provider: Krishna Pastrana MD    [facility] Danger Delcie Landau would like a refill of the following medications:  levothyroxine (SYNTHROID) 75 mcg tablet Krishna Pastrana MD]    Preferred pharmacy: [address] [place] [address], [address]    Comment:      Medication renewals requested in this message routed to other providers:  allopurinol (ZYLOPRIM) 300 mg tablet Anali Conde MD]  amLODIPine (NORVASC) 10 mg tablet Anali Conde MD]

## 2017-08-23 ENCOUNTER — OFFICE VISIT (OUTPATIENT)
Dept: ENDOCRINOLOGY | Age: 77
End: 2017-08-23

## 2017-08-23 VITALS
BODY MASS INDEX: 24.5 KG/M2 | SYSTOLIC BLOOD PRESSURE: 127 MMHG | HEIGHT: 71 IN | HEART RATE: 64 BPM | WEIGHT: 175 LBS | DIASTOLIC BLOOD PRESSURE: 71 MMHG

## 2017-08-23 DIAGNOSIS — E03.4 HYPOTHYROIDISM DUE TO ACQUIRED ATROPHY OF THYROID: Primary | ICD-10-CM

## 2017-08-23 RX ORDER — LEVOTHYROXINE SODIUM 75 UG/1
75 TABLET ORAL
Qty: 90 TAB | Refills: 3 | Status: SHIPPED | OUTPATIENT
Start: 2017-08-23 | End: 2018-02-07 | Stop reason: SDUPTHER

## 2017-08-23 NOTE — PROGRESS NOTES
Chief Complaint   Patient presents with    Thyroid Problem     pcp and pharmacy verified   Records since last visit reviewed  History of Present Illness: Barron Booth is a 68 y.o. 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 At his last visit in February 2017 he was clinically euthyroid on LT4 75mcg daily. Pt notes his energy is good and he is feeling well overall. Pt denies issues of CP, SOB, palpitations, tremors, diarrhea or constipation, heat or cold intolerance. He notes that he saw his cardiologist and after a treadmill stress test, was told his heart is doing well. Pt notes he is back to going out side cutting wood and doing his yard work. A significant portion of the history was obtained by his wife. Current Outpatient Prescriptions   Medication Sig    levothyroxine (SYNTHROID) 75 mcg tablet Take 1 Tab by mouth Daily (before breakfast). For thyroid    allopurinol (ZYLOPRIM) 300 mg tablet Take 1 Tab by mouth daily. Must make doctor appointment for refill    amLODIPine (NORVASC) 10 mg tablet Take 1 tablet daily. Must make doctor appointment for refill.  atorvastatin (LIPITOR) 10 mg tablet Take 1 Tab by mouth daily.  aspirin delayed-release 81 mg tablet Take 1 Tab by mouth daily.  benazepril (LOTENSIN) 40 mg tablet TAKE 1 TABLET DAILY    hydroCHLOROthiazide (MICROZIDE) 12.5 mg capsule Take 1 Cap by mouth daily.  metoprolol succinate (TOPROL-XL) 50 mg XL tablet Take 50 mg by mouth two (2) times a day. No current facility-administered medications for this visit. Allergies   Allergen Reactions    Bee Sting [Sting, Bee] Anaphylaxis    Pcn [Penicillins] Swelling     Review of Systems:  - Cardiovascular: no chest pain  - Neurological: no tremors  - Integumentary: skin is normal    Physical Examination:  Blood pressure 127/71, pulse 64, height 5' 11\" (1.803 m), weight 175 lb (79.4 kg).   - General: pleasant, no distress, good eye contact   - Neck: no thyromegaly or thyroid bruits  - Cardiovascular: regular, normal rate, nl s1 and s2, no m/r/g   - Integumentary: skin is normal, no edema  - Neurological: reflexes 2+ at biceps, no tremors  - Psychiatric: normal mood and affect    Data Reviewed:   Component      Latest Ref Rng & Units 2/13/2017 2/13/2017 2/13/2017          10:19 AM 10:19 AM 10:19 AM   T3, total      71 - 180 ng/dL   108   T4, Free      0.82 - 1.77 ng/dL  1.43    TSH      0.450 - 4.500 uIU/mL 0.459       Assessment/Plan:   1) Hypothyroidism > Pt's TFTs were at goal in July 2017 on the LT4 75mcg daily. Will have pt continue this regimen He does not need any further appointments with me. I would recommend that Dr. Lozada Abts check his TSH and Free T4 every 6 months. Pt voices understanding and agreement with the plan.             Copy sent to:  Dr. Miriam Islas

## 2017-10-09 RX ORDER — HYDROCHLOROTHIAZIDE 12.5 MG/1
12.5 CAPSULE ORAL DAILY
Qty: 90 CAP | Refills: 0 | Status: SHIPPED | OUTPATIENT
Start: 2017-10-09 | End: 2018-01-08 | Stop reason: SDUPTHER

## 2017-10-09 RX ORDER — BENAZEPRIL HYDROCHLORIDE 40 MG/1
TABLET ORAL
Qty: 90 TAB | Refills: 0 | Status: SHIPPED | OUTPATIENT
Start: 2017-10-09 | End: 2018-01-08 | Stop reason: SDUPTHER

## 2017-10-09 NOTE — TELEPHONE ENCOUNTER
From: Piper Beckford  To: Jennifer Zarco MD  Sent: 10/7/2017 11:58 AM EDT  Subject: Medication Renewal Request    Original authorizing provider: MD Alejandro Albrecht would like a refill of the following medications:  benazepril (LOTENSIN) 40 mg tablet Jennifer Zarco MD]  hydroCHLOROthiazide (MICROZIDE) 12.5 mg capsule Jennifer Zarco MD]    Preferred pharmacy: [address] [place] [address], [address]    Comment:

## 2017-11-25 NOTE — LETTER
12/21/2017 4:20 PM 
 
Mr. Cyrus Lopez [address] [facility] [id number] I have scheduled an appointmentfor Friday, February 02, 2018 02:00 PM. If you can not keep this appointment please let us know (I sent you a Walkaboutt message in November) Sincerely, Verito Fried LPN

## 2017-11-27 RX ORDER — AMLODIPINE BESYLATE 10 MG/1
TABLET ORAL
Qty: 90 TAB | Refills: 0 | Status: SHIPPED | OUTPATIENT
Start: 2017-11-27 | End: 2018-03-02 | Stop reason: SDUPTHER

## 2017-11-27 NOTE — TELEPHONE ENCOUNTER
From: Bunny Atkins  To: Julianne Xiong MD  Sent: 11/25/2017 10:52 AM EST  Subject: Medication Renewal Request    Original authorizing provider: MD Carter Wolfe would like a refill of the following medications:  amLODIPine (NORVASC) 10 mg tablet Julianne Xiong MD]    Preferred pharmacy: [address]    Comment:

## 2017-12-08 NOTE — TELEPHONE ENCOUNTER
From: Bunny Atkins  To: Julianne Xiong MD  Sent: 12/8/2017 1:46 PM EST  Subject: Medication Renewal Request    Original authorizing provider: MD Carter Wolfe would like a refill of the following medications:  atorvastatin (LIPITOR) 10 mg tablet Julianne Xiong MD]    Preferred pharmacy: [facility], [address]    Comment:

## 2017-12-09 RX ORDER — ATORVASTATIN CALCIUM 10 MG/1
10 TABLET, FILM COATED ORAL DAILY
Qty: 90 TAB | Refills: 3 | Status: SHIPPED | OUTPATIENT
Start: 2017-12-09 | End: 2018-03-16

## 2018-02-02 ENCOUNTER — OFFICE VISIT (OUTPATIENT)
Dept: INTERNAL MEDICINE CLINIC | Age: 78
End: 2018-02-02

## 2018-02-02 VITALS
WEIGHT: 186 LBS | BODY MASS INDEX: 26.04 KG/M2 | DIASTOLIC BLOOD PRESSURE: 63 MMHG | RESPIRATION RATE: 20 BRPM | OXYGEN SATURATION: 95 % | HEART RATE: 62 BPM | SYSTOLIC BLOOD PRESSURE: 143 MMHG | TEMPERATURE: 96.8 F | HEIGHT: 71 IN

## 2018-02-02 DIAGNOSIS — I10 ESSENTIAL HYPERTENSION: ICD-10-CM

## 2018-02-02 DIAGNOSIS — E03.4 HYPOTHYROIDISM DUE TO ACQUIRED ATROPHY OF THYROID: ICD-10-CM

## 2018-02-02 DIAGNOSIS — Z71.89 ADVANCE DIRECTIVE DISCUSSED WITH PATIENT: ICD-10-CM

## 2018-02-02 DIAGNOSIS — Z00.00 MEDICARE ANNUAL WELLNESS VISIT, SUBSEQUENT: Primary | ICD-10-CM

## 2018-02-02 DIAGNOSIS — R73.02 GLUCOSE INTOLERANCE (IMPAIRED GLUCOSE TOLERANCE): ICD-10-CM

## 2018-02-02 DIAGNOSIS — E78.00 HYPERCHOLESTEROLEMIA: ICD-10-CM

## 2018-02-02 DIAGNOSIS — Z86.79 ATRIAL FIBRILLATION, CURRENTLY IN SINUS RHYTHM: ICD-10-CM

## 2018-02-02 LAB — HBA1C MFR BLD HPLC: 6.2 % (ref 4.8–5.6)

## 2018-02-02 RX ORDER — DOXYCYCLINE 100 MG/1
100 CAPSULE ORAL 2 TIMES DAILY
Qty: 20 CAP | Refills: 0 | Status: SHIPPED | OUTPATIENT
Start: 2018-02-02 | End: 2018-07-26 | Stop reason: ALTCHOICE

## 2018-02-02 NOTE — PROGRESS NOTES
HISTORY OF PRESENT ILLNESS  Sharon Willson is a 68 y.o. male. HPI  He presents for follow up of hypertension, hyperlipidemia and glucose intolerance. He has history of atrial fibrillation now in sinus rhythm. Diet and Lifestyle: not attempting to follow a low fat, low cholesterol diet, generally follows a low sodium diet, does not rigorously follow a diabetic diet, exercises regularly, nonsmoker  Medication compliance: compliant all of the time  Medication side effects: none  Home BP Monitoring: not done recently  Cardiovascular ROS:  He complains of lower extremity edema at ankles, but not every day   He denies palpitations, orthopnea, exertional chest pressure/discomfort, claudication, dyspnea on exertion, dizziness     He presents for follow up of hypothyroidism. . Patient denies weight changes, heat / cold intolerance, change in energy level. Course to date has been well controlled. He describes pimples/boils in nose. This has been for past 2-3 months. Has had pus and blood drain from them and they are painful. Can be either side.        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    Advance directive discussed with patient Z70.80    Combined forms of 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 eye H25.811    Combined forms of age-related cataract of left eye H25.812     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     Past Surgical History:   Procedure Laterality Date    HX CATARACT REMOVAL Right 08/31/2016    HX HEENT      nose polyps    HX ORTHOPAEDIC      L elbow surgery     Social History     Social History    Marital status:      Spouse name: N/A    Number of children: N/A    Years of education: N/A     Social History Main Topics    Smoking status: Former Smoker     Packs/day: 1.00     Years: 16.00     Quit date: 3/12/1988    Smokeless tobacco: Never Used    Alcohol use No    Drug use: Yes     Special: Prescription, Marijuana      Comment: \"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\"    Sexual activity: Yes     Partners: Female     Other Topics Concern    None     Social History Narrative     Family History   Problem Relation Age of Onset   [facility] Hypertension Mother     Stroke Mother     Heart Disease Brother      late 61    Cancer Brother      Lung    Stroke Father     Cancer Sister      Lung     Allergies   Allergen Reactions    Bee Sting [Sting, Bee] Anaphylaxis    Pcn [Penicillins] Swelling     Current Outpatient Prescriptions   Medication Sig Dispense Refill    hydroCHLOROthiazide (MICROZIDE) 12.5 mg capsule Take 1 Cap by mouth daily. 90 Cap 3    benazepril (LOTENSIN) 40 mg tablet TAKE 1 TABLET DAILY 90 Tab 3    atorvastatin (LIPITOR) 10 mg tablet Take 1 Tab by mouth daily. 90 Tab 3    amLODIPine (NORVASC) 10 mg tablet Take 1 tablet daily. Must make doctor appointment for refill. 90 Tab 0    levothyroxine (SYNTHROID) 75 mcg tablet Take 1 Tab by mouth Daily (before breakfast). For thyroid 90 Tab 3    allopurinol (ZYLOPRIM) 300 mg tablet Take 1 Tab by mouth daily. Must make doctor appointment for refill 90 Tab 0    aspirin delayed-release 81 mg tablet Take 1 Tab by mouth daily. 90 Tab 3    metoprolol succinate (TOPROL-XL) 50 mg XL tablet Take 50 mg by mouth two (2) times a day. Review of Systems   Constitutional: Negative for malaise/fatigue and weight loss. Gastrointestinal: Negative for constipation, diarrhea and heartburn.    Musculoskeletal: Positive for joint pain. Negative for back pain. Neurological: Negative for dizziness, tingling and focal weakness. Visit Vitals    /63 (BP 1 Location: Left arm, BP Patient Position: Sitting)    Pulse 62    Temp 96.8 °F (36 °C) (Oral)    Resp 20    Ht 5' 11\" (1.803 m)    Wt 186 lb (84.4 kg)    SpO2 95%    BMI 25.94 kg/m2     Physical Exam   Constitutional: He is oriented to person, place, and time. He appears well-developed and well-nourished. HENT:   Head: Normocephalic and atraumatic. Nose:       Eyes: Conjunctivae are normal. Pupils are equal, round, and reactive to light. Neck: Neck supple. Carotid bruit is not present. No thyromegaly present. Cardiovascular: Normal rate, regular rhythm and normal heart sounds. PMI is not displaced. Exam reveals no gallop. No murmur heard. Pulses:       Dorsalis pedis pulses are 2+ on the right side, and 2+ on the left side. Posterior tibial pulses are 2+ on the right side, and 2+ on the left side. Pulmonary/Chest: Effort normal. He has no wheezes. He has no rhonchi. He has no rales. Abdominal: Soft. Normal appearance. He exhibits no abdominal bruit and no mass. There is no hepatosplenomegaly. There is no tenderness. Musculoskeletal: He exhibits no edema. Lymphadenopathy:     He has no cervical adenopathy. Right: No supraclavicular adenopathy present. Left: No supraclavicular adenopathy present. Neurological: He is alert and oriented to person, place, and time. No sensory deficit. Skin: Skin is warm, dry and intact. No rash noted. On nose skin is somewhat thickened and on the left side there is some erythema and swelling. Psychiatric: He has a normal mood and affect. His behavior is normal.   Nursing note and vitals reviewed.     Results for orders placed or performed in visit on 02/02/18   AMB POC HEMOGLOBIN A1C   Result Value Ref Range    Hemoglobin A1c (POC) 6.2 (A) 4.8 - 5.6 %     Lab Results   Component Value Date/Time    TSH 1.330 07/14/2017 10:54 AM    T4, Free 1.43 07/14/2017 10:54 AM     Lab Results   Component Value Date/Time    Cholesterol, total 116 12/28/2016 12:00 AM    HDL Cholesterol 30 12/28/2016 12:00 AM    LDL, calculated 60 12/28/2016 12:00 AM    VLDL, calculated 26 12/28/2016 12:00 AM    Triglyceride 129 12/28/2016 12:00 AM    CHOL/HDL Ratio 5.8 05/14/2010 04:45 PM     Lab Results   Component Value Date/Time    Sodium 140 12/28/2016 12:00 AM    Potassium 4.4 12/28/2016 12:00 AM    Chloride 103 12/28/2016 12:00 AM    CO2 23 12/28/2016 12:00 AM    Anion gap 9 04/17/2016 06:19 PM    Glucose 109 12/28/2016 12:00 AM    BUN 17 12/28/2016 12:00 AM    Creatinine 1.08 12/28/2016 12:00 AM    BUN/Creatinine ratio 16 12/28/2016 12:00 AM    GFR est AA 77 12/28/2016 12:00 AM    GFR est non-AA 66 12/28/2016 12:00 AM    Calcium 9.6 12/28/2016 12:00 AM    Bilirubin, total 0.3 12/28/2016 12:00 AM    AST (SGOT) 24 12/28/2016 12:00 AM    Alk. phosphatase 101 12/28/2016 12:00 AM    Protein, total 7.6 12/28/2016 12:00 AM    Albumin 3.8 12/28/2016 12:00 AM    Globulin 4.3 04/17/2016 06:19 PM    A-G Ratio 1.0 12/28/2016 12:00 AM    ALT (SGPT) 16 12/28/2016 12:00 AM       ASSESSMENT and PLAN    ICD-10-CM ICD-9-CM    1. Medicare annual wellness visit, subsequent Z00.00 V70.0    2. Advance directive discussed with patient Z71.89 V65.49    3. Glucose intolerance (impaired glucose tolerance) R73.02 790.22 AMB POC HEMOGLOBIN A1C      HEMOGLOBIN A1C WITH EAG   4. Essential hypertension I10 401.9    5. Hypercholesterolemia E78.00 272.0 LIPID PANEL      METABOLIC PANEL, COMPREHENSIVE   6. 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 Atrial fibrillation, currently in sinus rhythm Z86.79 427.31      Diagnoses and all orders for this visit:    1. Medicare annual wellness visit, subsequent    2. Advance directive discussed with patient    3.  Glucose intolerance (impaired glucose tolerance)  -     AMB POC HEMOGLOBIN A1C  -     HEMOGLOBIN A1C WITH EAG; Future    4. Essential hypertension  Hypertension is borderline controlled. 5. Hypercholesterolemia  Hyperlipidemia is controlled  -     LIPID PANEL; Future  -     METABOLIC PANEL, COMPREHENSIVE; Future    6. Hypothyroidism due to acquired atrophy of thyroid  Euthyroid on replacement.   -     TSH 3RD GENERATION; Future    7. Atrial fibrillation, currently in sinus rhythm    Other orders  -     doxycycline (VIBRAMYCIN) 100 mg capsule; Take 1 Cap by mouth two (2) times a day. Indications: Skin and Skin Structure Infection      Follow-up Disposition:  Return in about 6 months (around 8/2/2018) for HTN, chol, gluc  Fasting lab one week prior . lab results and schedule of future lab studies reviewed with patient  reviewed diet, exercise and weight control  cardiovascular risk and specific lipid/LDL goals reviewed  I have discussed the diagnosis with the patient and the intended plan as seen in the above orders. Patient is in agreement. The patient has received an after-visit summary and questions were answered concerning future plans. I have discussed medication side effects and warnings with the patient as well.

## 2018-02-02 NOTE — PATIENT INSTRUCTIONS
The best way to stay healthy is to live a healthy lifestyle. A healthy lifestyle includes regular exercise, eating a well-balanced diet, keeping a healthy weight and not smoking. Regular physical exams and screening tests are another important way to take care of yourself. Preventive exams provided by health care providers can find health problems early when treatment works best and can keep you from getting certain diseases or illnesses. Preventive services include exams, lab tests, screenings, shots, monitoring and information to help you take care of your own health. All people over 65 should have a pneumonia shot. Pneumonia shots are usually only needed once in a lifetime unless your doctor decides differently. In addition to your physical exam, some screening tests are recommended:    All people over 65 should have a yearly flu shot. People over 65 are at medium to high risk for Hepatitis B. Three shots are needed for complete protection. Bone mass measurement (dexa scan) is recommended every two years. Diabetes Mellitus screening is recommended every year. Glaucoma is an eye disease caused by high pressure in the eye. An eye exam is recommended every year. Cardiovascular screening tests that check your cholesterol and other blood fat (lipid) levels are recommended every five years. Colorectal Cancer screening tests help to find pre-cancerous polyps (growths in the colon) so they can be removed before they turn into cancer. Tests ordered for screening depend on your personal and family history risk factors. Prostate Cancer Screening (annually up to age 76)    Screening for breast cancer is recommended yearly with a Mammogram.    Screening for cervical and vaginal cancer is recommended with a pelvic and Pap test every two years.  However if you have had an abnormal pap in the past  three years or at high risk for cervical or vaginal cancer Medicare will cover a pap test and a pelvic exam every year. Here is a list of your current Health Maintenance items with a due date:  Health Maintenance Due   Topic Date Due    Annual Well Visit  07/06/2017          Well Visit, Over 72: Care Instructions  Your Care Instructions    Physical exams can help you stay healthy. Your doctor has checked your overall health and may have suggested ways to take good care of yourself. He or she also may have recommended tests. At home, you can help prevent illness with healthy eating, regular exercise, and other steps. Follow-up care is a key part of your treatment and safety. Be sure to make and go to all appointments, and call your doctor if you are having problems. It's also a good idea to know your test results and keep a list of the medicines you take. How can you care for yourself at home? · Reach and stay at a healthy weight. This will lower your risk for many problems, such as obesity, diabetes, heart disease, and high blood pressure. · Get at least 30 minutes of exercise on most days of the week. Walking is a good choice. You also may want to do other activities, such as running, swimming, cycling, or playing tennis or team sports. · Do not smoke. Smoking can make health problems worse. If you need help quitting, talk to your doctor about stop-smoking programs and medicines. These can increase your chances of quitting for good. · Protect your skin from too much sun. When you're outdoors from 10 a.m. to 4 p.m., stay in the shade or cover up with clothing and a hat with a wide brim. Wear sunglasses that block UV rays. Even when it's cloudy, put broad-spectrum sunscreen (SPF 30 or higher) on any exposed skin. · See a dentist one or two times a year for checkups and to have your teeth cleaned. · Wear a seat belt in the car. · Limit alcohol to 2 drinks a day for men and 1 drink a day for women. Too much alcohol can cause health problems.   Follow your doctor's advice about when to have certain tests. These tests can spot problems early. For men and women  · Cholesterol. Your doctor will tell you how often to have this done based on your overall health and other things that can increase your risk for heart attack and stroke. · Blood pressure. Have your blood pressure checked during a routine doctor visit. Your doctor will tell you how often to check your blood pressure based on your age, your blood pressure results, and other factors. · Diabetes. Ask your doctor whether you should have tests for diabetes. · Vision. Experts recommend that you have yearly exams for glaucoma and other age-related eye problems. · Hearing. Tell your doctor if you notice any change in your hearing. You can have tests to find out how well you hear. · Colon cancer tests. Keep having colon cancer tests as your doctor recommends. You can have one of several types of tests. · Heart attack and stroke risk. At least every 4 to 6 years, you should have your risk for heart attack and stroke assessed. Your doctor uses factors such as your age, blood pressure, cholesterol, and whether you smoke or have diabetes to show what your risk for a heart attack or stroke is over the next 10 years. · Osteoporosis. Talk to your doctor about whether you should have a bone density test to find out whether you have thinning bones. Also ask your doctor about whether you should take calcium and vitamin D supplements. For women  · Pap test and pelvic exam. You may no longer need a Pap test. Talk with your doctor about whether to stop or continue to have Pap tests. · Breast exam and mammogram. Ask how often you should have a mammogram, which is an X-ray of your breasts. A mammogram can spot breast cancer before it can be felt and when it is easiest to treat. · Thyroid disease. Talk to your doctor about whether to have your thyroid checked as part of a regular physical exam. Women have an increased chance of a thyroid problem.   For men  · Prostate exam. Talk to your doctor about whether you should have a blood test (called a PSA test) for prostate cancer. Experts disagree on whether men should have this test. Some experts recommend that you discuss the benefits and risks of the test with your doctor. · Abdominal aortic aneurysm. Ask your doctor whether you should have a test to check for an aneurysm. You may need a test if you ever smoked or if your parent, brother, sister, or child has had an aneurysm. When should you call for help? Watch closely for changes in your health, and be sure to contact your doctor if you have any problems or symptoms that concern you. Where can you learn more? Go to http://hai-shanti.info/. Enter Z879 in the search box to learn more about \"Well Visit, Over 65: Care Instructions. \"  Current as of: May 12, 2017  Content Version: 11.4  © 5109-9888 [employer]. Care instructions adapted under license by [employer] (which disclaims liability or warranty for this information). If you have questions about a medical condition or this instruction, always ask your healthcare professional. [address] any warranty or liability for your use of this information.

## 2018-02-02 NOTE — PROGRESS NOTES
This is a Subsequent Medicare Annual Wellness Exam (AWV) (Performed 12 months after IPPE or effective date of Medicare Part B enrollment)    I have reviewed the patient's medical history in detail and updated the computerized patient record. 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 90 Cap 3    benazepril (LOTENSIN) 40 mg tablet TAKE 1 TABLET DAILY 90 Tab 3    atorvastatin (LIPITOR) 10 mg tablet Take 1 Tab by mouth daily. 90 Tab 3    amLODIPine (NORVASC) 10 mg tablet Take 1 tablet daily. Must make doctor appointment for refill. 90 Tab 0    levothyroxine (SYNTHROID) 75 mcg tablet Take 1 Tab by mouth Daily (before breakfast). For thyroid 90 Tab 3    allopurinol (ZYLOPRIM) 300 mg tablet Take 1 Tab by mouth daily. Must make doctor appointment for refill 90 Tab 0    aspirin delayed-release 81 mg tablet Take 1 Tab by mouth daily. 90 Tab 3    metoprolol succinate (TOPROL-XL) 50 mg XL tablet Take 50 mg by mouth two (2) times a day.        Allergies   Allergen Reactions    Bee Sting [Sting, Bee] Anaphylaxis    Pcn [Penicillins] Swelling     Family History   Problem Relation Age of Onset    Hypertension Mother     Stroke Mother     Heart Disease Brother      late 61    Cancer Brother      Lung    Stroke Father     Cancer Sister      Lung     Social History   Substance Use Topics    Smoking status: Former Smoker     Packs/day: 1.00     Years: 16.00     Quit date: 3/12/1988    Smokeless tobacco: Never Used    Alcohol use No     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    Advance directive discussed with patient Z70.80    Combined forms of 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 eye H25.811    Combined forms of age-related cataract of left eye H25.812       Depression Risk Factor Screening:     PHQ over the last two weeks 5/30/2017   Little interest or pleasure in doing things Not at all   Feeling down, depressed or hopeless Not at all   Total Score PHQ 2 0     Alcohol Risk Factor Screening: You do not drink alcohol or very rarely. Functional Ability and Level of Safety:   Hearing Loss  Has hearing loss; tried hearing aids. Activities of Daily Living  The home contains: handrails and rugs  Patient does total self care    Fall Risk  Fall Risk Assessment, last 12 mths 5/30/2017   Able to walk? Yes   Fall in past 12 months? No       Abuse Screen  Patient is not abused    Cognitive Screening   Evaluation of Cognitive Function:  Has your family/caregiver stated any concerns about your memory: no  Normal    Patient Care Team   Patient Care Team:  Awilda aHrry MD as PCP - General (Internal Medicine)  Chloé Silva MD (Cardiology)  Sara Rico MD as Consulting Provider (Endocrinology)  Sara Rico MD as Consulting Provider (Endocrinology)    Assessment/Plan   Education and counseling provided:  Are appropriate based on today's review and evaluation    Diagnoses and all orders for this visit:    1. Glucose intolerance (impaired glucose tolerance)  -     AMB POC HEMOGLOBIN A1C    2. Essential hypertension    3. Hypercholesterolemia    4. Hypothyroidism due to acquired atrophy of thyroid    5.  Atrial fibrillation, currently in sinus rhythm        Health Maintenance Due   Topic Date Due    MEDICARE YEARLY EXAM  07/06/2017

## 2018-02-02 NOTE — PROGRESS NOTES
Flor Nathan  Identified pt with two pt identifiers(name and ). Chief Complaint   Patient presents with    Blood Pressure Check    Cholesterol Problem    Blood sugar problem       Reviewed record In preparation for visit and have obtained necessary documentation. Advanced directive / living will on file. 1. Have you been to the ER, urgent care clinic or hospitalized since your last visit? No     2. Have you seen or consulted any other health care providers outside of the [address] [place] [address] since your last visit? Include any pap smears or colon screening. Dr Eleanor Lopez, Dr Oliver Perdomo reviewed with provider. Health Maintenance reviewed:     Health Maintenance Due   Topic    MEDICARE YEARLY EXAM    14   Wt Readings from Last 3 Encounters:   18 186 lb (84.4 kg)   17 175 lb (79.4 kg)   17 177 lb (80.3 kg)   Do you ever feel afraid of your partner? N N N        N  Vitals:    18 1403 18 1411   BP: 146/63 143/63   Pulse: 68 62   Resp: 20    Temp: 96.8 °F (36 °C)    TempSrc: Oral    SpO2: 95%    Weight: 186 lb (84.4 kg)    Height: 5' 11\" (1.803 m)    PainSc:   3    PainLoc: Knee    opping for groceries No Help Ne  Learning Assessment 2014   PRIMARY LEARNER Patient   PRIMARY LANGUAGE ENGLISH   LEARNER PREFERENCE PRIMARY DEMONSTRATION   ANSWERED BY patient   RELATIONSHIP SELF   isk Assessment, last 12 mths 5/  PHQ over the last two weeks 2017   Little interest or pleasure in doing things Not at all   Feeling down, depressed or hopeless Not at all   Total Score PHQ 2 0      Able to walk? Yes   Fall in past 12 months?  No

## 2018-02-02 NOTE — MR AVS SNAPSHOT
Senlevar Coffee 
 
 
 [address] [place] [address] [phone] Patient: Lopez Wick MRN: [id number]:[id number] Visit Information Date & Time Provider Department Dept. Phone Encounter #  
 2/2/2018  2:00 PM Marissa Rai MD Juan Pedersen [phone] [id number] Follow-up Instructions Return in about 6 months (around 8/2/2018) for HTN, chol, gluc  Fasting lab one week prior . Upcoming Health Maintenance Date Due  
 MEDICARE YEARLY EXAM 7/6/2017 GLAUCOMA SCREENING Q2Y 7/3/2019 COLONOSCOPY 11/13/2024 DTaP/Tdap/Td series (2 - Td) 11/18/2024 Allergies as of 2/2/2018  Review Complete On: 2/2/2018 By: Marissa Rai MD  
  
 Severity Noted Reaction Type Reactions Bee Sting [Sting, Bee] High 03/12/2010   Systemic Anaphylaxis Pcn [Penicillins]  04/01/2010    Swelling Current Immunizations  Reviewed on 2/2/2018 Name Date Influenza High Dose Vaccine PF 10/16/2017, 10/17/2016, 9/21/2015, 10/9/2014 Influenza Vaccine 10/8/2013 Influenza Vaccine Split 9/27/2011 Pneumococcal Conjugate (PCV-13) 7/5/2016 TD Vaccine 1/1/1999 Tdap 11/18/2014 ZZZ-RETIRED (DO NOT USE) Pneumococcal Vaccine (Unspecified Type) 5/11/2012 Reviewed by Marylee Rea, LPN on 6/8/9126 at  4:72 PM  
You Were Diagnosed With   
  
 Codes Comments Medicare annual wellness visit, subsequent    -  Primary ICD-10-CM: Z00.00 ICD-9-CM: V70.0 Advance directive discussed with patient     ICD-10-CM: Z71.89 ICD-9-CM: V65.49 Glucose intolerance (impaired glucose tolerance)     ICD-10-CM: R73.02 
ICD-9-CM: 790.22 Essential hypertension     ICD-10-CM: I10 
ICD-9-CM: 401.9 Hypercholesterolemia     ICD-10-CM: E78.00 ICD-9-CM: 272.0 Hypothyroidism due to acquired atrophy of thyroid     ICD-10-CM: E03.4 ICD-9-CM: 244.8, 246.8 Atrial fibrillation, currently in sinus rhythm     ICD-10-CM: Z86.79 
ICD-9-CM: 427.31 Vitals BP Pulse Temp Resp Height(growth percentile) Weight(growth percentile) 143/63 (BP 1 Location: Left arm, BP Patient Position: Sitting) 62 96.8 °F (36 °C) (Oral) 20 5' 11\" (1.803 m) 186 lb (84.4 kg) SpO2 BMI Smoking Status 95% 25.94 kg/m2 Former Smoker Vitals History BMI and BSA Data Body Mass Index Body Surface Area  
 25.94 kg/m 2 2.06 m 2 Preferred Pharmacy Pharmacy Name Phone [address] [place] [address] [phone] Your Updated Medication List  
  
   
This list is accurate as of: 2/2/18  3:03 PM.  Always use your most recent med list.  
  
  
  
  
 allopurinol 300 mg tablet Commonly known as:  Brian Giron Take 1 Tab by mouth daily. Must make doctor appointment for refill  
  
 amLODIPine 10 mg tablet Commonly known as:  Sandhya Chang Take 1 tablet daily. Must make doctor appointment for refill. aspirin delayed-release 81 mg tablet Take 1 Tab by mouth daily. atorvastatin 10 mg tablet Commonly known as:  LIPITOR Take 1 Tab by mouth daily. benazepril 40 mg tablet Commonly known as:  LOTENSIN  
TAKE 1 TABLET DAILY  
  
 doxycycline 100 mg capsule Commonly known as:  VIBRAMYCIN Take 1 Cap by mouth two (2) times a day. Indications: Skin and Skin Structure Infection  
  
 hydroCHLOROthiazide 12.5 mg capsule Commonly known as:  Stevo Nevaeh Take 1 Cap by mouth daily. levothyroxine 75 mcg tablet Commonly known as:  SYNTHROID Take 1 Tab by mouth Daily (before breakfast). For thyroid  
  
 metoprolol succinate 50 mg XL tablet Commonly known as:  TOPROL-XL Take 50 mg by mouth two (2) times a day. Prescriptions Sent to Pharmacy Refills  
 doxycycline (VIBRAMYCIN) 100 mg capsule 0 Sig: Take 1 Cap by mouth two (2) times a day.  Indications: Skin and Skin Structure Infection Class: Normal  
 Pharmacy: [facility] [address], [place]  #: [phone] Route: Oral  
  
We Performed the Following AMB POC HEMOGLOBIN A1C [13373 CPT(R)] Follow-up Instructions Return in about 6 months (around 8/2/2018) for HTN, chol, gluc  Fasting lab one week prior . To-Do List   
 08/02/2018 Lab:  HEMOGLOBIN A1C WITH EAG   
  
 08/02/2018 Lab:  LIPID PANEL   
  
 08/02/2018 Lab:  METABOLIC PANEL, COMPREHENSIVE   
  
 08/02/2018 Lab:  TSH 3RD GENERATION Patient Instructions The best way to stay healthy is to live a healthy lifestyle. A healthy lifestyle includes regular exercise, eating a well-balanced diet, keeping a healthy weight and not smoking. Regular physical exams and screening tests are another important way to take care of yourself. Preventive exams provided by health care providers can find health problems early when treatment works best and can keep you from getting certain diseases or illnesses. Preventive services include exams, lab tests, screenings, shots, monitoring and information to help you take care of your own health. All people over 65 should have a pneumonia shot. Pneumonia shots are usually only needed once in a lifetime unless your doctor decides differently. In addition to your physical exam, some screening tests are recommended: 
 
All people over 65 should have a yearly flu shot. People over 65 are at medium to high risk for Hepatitis B. Three shots are needed for complete protection. Bone mass measurement (dexa scan) is recommended every two years. Diabetes Mellitus screening is recommended every year. Glaucoma is an eye disease caused by high pressure in the eye. An eye exam is recommended every year. Cardiovascular screening tests that check your cholesterol and other blood fat (lipid) levels are recommended every five years. Colorectal Cancer screening tests help to find pre-cancerous polyps (growths in the colon) so they can be removed before they turn into cancer. Tests ordered for screening depend on your personal and family history risk factors. Prostate Cancer Screening (annually up to age 76) Screening for breast cancer is recommended yearly with a Mammogram. 
 
Screening for cervical and vaginal cancer is recommended with a pelvic and Pap test every two years. However if you have had an abnormal pap in the past  three years or at high risk for cervical or vaginal cancer Medicare will cover a pap test and a pelvic exam every year. Here is a list of your current Health Maintenance items with a due date: 
Health Maintenance Due Topic Date Due  
 Annual Well Visit  07/06/2017 Well Visit, Over 72: Care Instructions Your Care Instructions Physical exams can help you stay healthy. Your doctor has checked your overall health and may have suggested ways to take good care of yourself. He or she also may have recommended tests. At home, you can help prevent illness with healthy eating, regular exercise, and other steps. Follow-up care is a key part of your treatment and safety. Be sure to make and go to all appointments, and call your doctor if you are having problems. It's also a good idea to know your test results and keep a list of the medicines you take. How can you care for yourself at home? · Reach and stay at a healthy weight. This will lower your risk for many problems, such as obesity, diabetes, heart disease, and high blood pressure. · Get at least 30 minutes of exercise on most days of the week. Walking is a good choice. You also may want to do other activities, such as running, swimming, cycling, or playing tennis or team sports. · Do not smoke. Smoking can make health problems worse. If you need help quitting, talk to your doctor about stop-smoking programs and medicines. These can increase your chances of quitting for good. · Protect your skin from too much sun. When you're outdoors from 10 a.m. to 4 p.m., stay in the shade or cover up with clothing and a hat with a wide brim. Wear sunglasses that block UV rays. Even when it's cloudy, put broad-spectrum sunscreen (SPF 30 or higher) on any exposed skin. · See a dentist one or two times a year for checkups and to have your teeth cleaned. · Wear a seat belt in the car. · Limit alcohol to 2 drinks a day for men and 1 drink a day for women. Too much alcohol can cause health problems. Follow your doctor's advice about when to have certain tests. These tests can spot problems early. For men and women · Cholesterol. Your doctor will tell you how often to have this done based on your overall health and other things that can increase your risk for heart attack and stroke. · Blood pressure. Have your blood pressure checked during a routine doctor visit. Your doctor will tell you how often to check your blood pressure based on your age, your blood pressure results, and other factors. · Diabetes. Ask your doctor whether you should have tests for diabetes. · Vision. Experts recommend that you have yearly exams for glaucoma and other age-related eye problems. · Hearing. Tell your doctor if you notice any change in your hearing. You can have tests to find out how well you hear. · Colon cancer tests. Keep having colon cancer tests as your doctor recommends. You can have one of several types of tests. · Heart attack and stroke risk. At least every 4 to 6 years, you should have your risk for heart attack and stroke assessed. Your doctor uses factors such as your age, blood pressure, cholesterol, and whether you smoke or have diabetes to show what your risk for a heart attack or stroke is over the next 10 years. · Osteoporosis.  Talk to your doctor about whether you should have a bone density test to find out whether you have thinning bones. Also ask your doctor about whether you should take calcium and vitamin D supplements. For women · Pap test and pelvic exam. You may no longer need a Pap test. Talk with your doctor about whether to stop or continue to have Pap tests. · Breast exam and mammogram. Ask how often you should have a mammogram, which is an X-ray of your breasts. A mammogram can spot breast cancer before it can be felt and when it is easiest to treat. · Thyroid disease. Talk to your doctor about whether to have your thyroid checked as part of a regular physical exam. Women have an increased chance of a thyroid problem. For men · Prostate exam. Talk to your doctor about whether you should have a blood test (called a PSA test) for prostate cancer. Experts disagree on whether men should have this test. Some experts recommend that you discuss the benefits and risks of the test with your doctor. · Abdominal aortic aneurysm. Ask your doctor whether you should have a test to check for an aneurysm. You may need a test if you ever smoked or if your parent, brother, sister, or child has had an aneurysm. When should you call for help? Watch closely for changes in your health, and be sure to contact your doctor if you have any problems or symptoms that concern you. Where can you learn more? Go to http://hai-shanti.info/. Enter D624 in the search box to learn more about \"Well Visit, Over 65: Care Instructions. \" Current as of: May 12, 2017 Content Version: 11.4 © 8781-6334 Healthwise, Incorporated. Care instructions adapted under license by [employer] (which disclaims liability or warranty for this information). If you have questions about a medical condition or this instruction, always ask your healthcare professional. [place] [address] any warranty or liability for your use of this information. Introducing [facility]! Dear Balbir Londono: 
Thank you for requesting a Blipify account. Our records indicate that you already have an active Blipify account. You can access your account anytime at [email or web address] Did you know that you can access your hospital and ER discharge instructions at any time in Blipify? You can also review all of your test results from your hospital stay or ER visit. Additional Information If you have questions, please visit the Frequently Asked Questions section of the Blipify website at [email or web address] Remember, Blipify is NOT to be used for urgent needs. For medical emergencies, dial 911. Now available from your iPhone and Android! Please provide this summary of care documentation to your next provider. Your primary care clinician is listed as Awilda Harry. If you have any questions after today's visit, please call [phone].

## 2018-02-03 RX ORDER — LEVOTHYROXINE SODIUM 75 UG/1
75 TABLET ORAL
Qty: 90 TAB | Refills: 3 | Status: CANCELLED | OUTPATIENT
Start: 2018-02-03

## 2018-02-05 RX ORDER — ALLOPURINOL 300 MG/1
300 TABLET ORAL DAILY
Qty: 90 TAB | Refills: 3 | Status: SHIPPED | OUTPATIENT
Start: 2018-02-05 | End: 2018-04-24 | Stop reason: SDUPTHER

## 2018-02-05 NOTE — TELEPHONE ENCOUNTER
From: Flor Nathan  To: Maia Mosqueda MD  Sent: 2/3/2018 8:19 PM EST  Subject: Medication Renewal Request    Original authorizing provider: MD Colleen Hernandes Oas would like a refill of the following medications:  allopurinol (ZYLOPRIM) 300 mg tablet Maia Mosqueda MD]    Preferred pharmacy: [address] [place] [address], [address]    Comment:      Medication renewals requested in this message routed to other providers:  levothyroxine (SYNTHROID) 75 mcg tablet Harry Reynoso MD]

## 2018-02-05 NOTE — TELEPHONE ENCOUNTER
From: Fabiola Saleem  To: Luis Fiore MD  Sent: 2/3/2018 8:19 PM EST  Subject: Medication Renewal Request    Original authorizing provider: Luis Fiore MD    Stanislav Bonilla would like a refill of the following medications:  levothyroxine (SYNTHROID) 75 mcg tablet Luis Fiore MD]    Preferred pharmacy: [address] [place] [address], [address]    Comment:      Medication renewals requested in this message routed to other providers:  allopurinol (ZYLOPRIM) 300 mg tablet Katlyn Hardin MD]

## 2018-02-06 ENCOUNTER — LAB ONLY (OUTPATIENT)
Dept: ENDOCRINOLOGY | Age: 78
End: 2018-02-06

## 2018-02-06 DIAGNOSIS — I10 ESSENTIAL HYPERTENSION: ICD-10-CM

## 2018-02-06 DIAGNOSIS — E78.00 HYPERCHOLESTEROLEMIA: ICD-10-CM

## 2018-02-06 DIAGNOSIS — E03.4 HYPOTHYROIDISM DUE TO ACQUIRED ATROPHY OF THYROID: Primary | ICD-10-CM

## 2018-02-07 LAB
ALBUMIN SERPL-MCNC: 4.1 G/DL (ref 3.5–4.8)
ALBUMIN/GLOB SERPL: 1 {RATIO} (ref 1.2–2.2)
ALP SERPL-CCNC: 94 IU/L (ref 39–117)
ALT SERPL-CCNC: 16 IU/L (ref 0–44)
AST SERPL-CCNC: 20 IU/L (ref 0–40)
BILIRUB SERPL-MCNC: <0.2 MG/DL (ref 0–1.2)
BUN SERPL-MCNC: 19 MG/DL (ref 8–27)
BUN/CREAT SERPL: 17 (ref 10–24)
CALCIUM SERPL-MCNC: 10.1 MG/DL (ref 8.6–10.2)
CHLORIDE SERPL-SCNC: 97 MMOL/L (ref 96–106)
CHOLEST SERPL-MCNC: 141 MG/DL (ref 100–199)
CO2 SERPL-SCNC: 21 MMOL/L (ref 18–29)
CREAT SERPL-MCNC: 1.14 MG/DL (ref 0.76–1.27)
GFR SERPLBLD CREATININE-BSD FMLA CKD-EPI: 62 ML/MIN/1.73
GFR SERPLBLD CREATININE-BSD FMLA CKD-EPI: 71 ML/MIN/1.73
GLOBULIN SER CALC-MCNC: 4 G/DL (ref 1.5–4.5)
GLUCOSE SERPL-MCNC: 128 MG/DL (ref 65–99)
HDLC SERPL-MCNC: 35 MG/DL
INTERPRETATION, 910389: NORMAL
LDLC SERPL CALC-MCNC: 78 MG/DL (ref 0–99)
POTASSIUM SERPL-SCNC: 5 MMOL/L (ref 3.5–5.2)
PROT SERPL-MCNC: 8.1 G/DL (ref 6–8.5)
SODIUM SERPL-SCNC: 139 MMOL/L (ref 134–144)
TRIGL SERPL-MCNC: 139 MG/DL (ref 0–149)
TSH SERPL DL<=0.005 MIU/L-ACNC: 1.31 UIU/ML (ref 0.45–4.5)
VLDLC SERPL CALC-MCNC: 28 MG/DL (ref 5–40)

## 2018-02-07 RX ORDER — LEVOTHYROXINE SODIUM 75 UG/1
75 TABLET ORAL
Qty: 90 TAB | Refills: 3 | Status: SHIPPED | OUTPATIENT
Start: 2018-02-07 | End: 2018-04-24 | Stop reason: SDUPTHER

## 2018-03-02 NOTE — TELEPHONE ENCOUNTER
PCP: Sekou Maria MD     Last appt: 2/2/2018   Future Appointments  Date Time Provider Allan Carolina   7/26/2018 2:00 PM Sekou Maria MD [address] [place] [address]        Requested Prescriptions     Pending Prescriptions Disp Refills    amLODIPine (NORVASC) 10 mg tablet 90 Tab 3     Sig: Take 1 tablet daily.

## 2018-03-02 NOTE — TELEPHONE ENCOUNTER
----- Message from Nas Jacobson sent at 3/2/2018 10:26 AM EST -----  Regarding: Dr Jeniffer Ron requesting new Rx go to [facility] on [address] [phone]. Her number is [phone]. For \"amlodipine besolote\" 10 mg for 90 days.

## 2018-03-03 RX ORDER — AMLODIPINE BESYLATE 10 MG/1
TABLET ORAL
Qty: 90 TAB | Refills: 3 | Status: SHIPPED | OUTPATIENT
Start: 2018-03-03 | End: 2019-03-03 | Stop reason: SDUPTHER

## 2018-03-15 DIAGNOSIS — E78.00 HYPERCHOLESTEROLEMIA: Primary | ICD-10-CM

## 2018-03-15 RX ORDER — ATORVASTATIN CALCIUM 10 MG/1
10 TABLET, FILM COATED ORAL DAILY
Qty: 90 TAB | Refills: 3 | Status: CANCELLED | OUTPATIENT
Start: 2018-03-15

## 2018-03-15 NOTE — TELEPHONE ENCOUNTER
Wife states that the atorvastatin is not on the lower tier of there plan and they will have to pay full price wants something else (wife did not know what was on her plan).

## 2018-03-15 NOTE — TELEPHONE ENCOUNTER
If he wants the least expensive it might be simvastatin, but it does not mix well with amlodipine. There is an increase risk of myopathy. Can also get coupons, eg from [facility] that will reduce cost. Can get atorvastatin 10 mg for about $10 at [address] [place] [address] with [facility] coupon. I can send prescription if they let me know which [facility]. Can get it elsewhere, but at higher cost ([facility], [facility], [facility]).

## 2018-03-16 RX ORDER — LOVASTATIN 20 MG/1
20 TABLET ORAL
Qty: 30 TAB | Refills: 11 | Status: SHIPPED | OUTPATIENT
Start: 2018-03-16 | End: 2018-05-29 | Stop reason: SDUPTHER

## 2018-03-16 NOTE — TELEPHONE ENCOUNTER
Wife stated she would prefer the lovastatin either 10mg or 20mg as both are on the [employer] $4 list.

## 2018-03-16 NOTE — TELEPHONE ENCOUNTER
The problem is that lovastatin is much lower potency than atorvastatin. Even 20 mg of lovastatin only lowers cholesterol by about 25% compared to 40% with atorvastatin 10 mg. Lovastatin 80 mg lowers cholesterol by about 35%. The last lipid panel shows he is just at goal. Decreasing therapy will likely not keep him at goal. Lovastatin must be taken in the evening, for example at supper. Lovastatin and simvastatin also have a highest incidence of muscle toxicity. I have ordered the lovastatin 20 mg; some statin is better than no statin. He needs a follow up lipid panel in one month to see where this leaves the cholesterol.

## 2018-03-19 NOTE — TELEPHONE ENCOUNTER
Wife notified of RX at pharmacy, advised needs to be taken at evening meal. Informed of Dr Tavon Cantu note and fasting lab appt scheduled for 4/25/18. No further questions at this time.

## 2018-04-24 RX ORDER — ALLOPURINOL 300 MG/1
300 TABLET ORAL DAILY
Qty: 90 TAB | Refills: 3 | Status: SHIPPED | OUTPATIENT
Start: 2018-04-24 | End: 2019-05-06 | Stop reason: SDUPTHER

## 2018-04-24 NOTE — TELEPHONE ENCOUNTER
PCP: Jerri Curiel MD     Last appt: Visit date not found   Future Appointments  Date Time Provider Allan Jacksoni   7/26/2018 2:00 PM Jerri Curiel MD [facility]        Requested Prescriptions     Pending Prescriptions Disp Refills    allopurinol (ZYLOPRIM) 300 mg tablet 90 Tab 3     Sig: Take 1 Tab by mouth daily.

## 2018-04-24 NOTE — TELEPHONE ENCOUNTER
----- Message from Autumn Severino sent at 4/24/2018  9:34 AM EDT -----  Regarding: Dr. Radha Cardozo  Pt is requesting a refill for Allopurinol 300mg. [facility] DR LINNEA BRUCE.  Best contact [phone]

## 2018-05-29 DIAGNOSIS — E78.00 HYPERCHOLESTEROLEMIA: ICD-10-CM

## 2018-05-29 RX ORDER — LOVASTATIN 20 MG/1
20 TABLET ORAL
Qty: 90 TAB | Refills: 0 | Status: SHIPPED | OUTPATIENT
Start: 2018-05-29 | End: 2018-09-24 | Stop reason: SDUPTHER

## 2018-05-29 NOTE — TELEPHONE ENCOUNTER
He is overdue for lipid panel to evaluate effectiveness of lovastatin. Will give one refill, but no additional refills until this is done.

## 2018-05-29 NOTE — TELEPHONE ENCOUNTER
Buster Ariza [facility]   7/26/2018 2:00 PM Arcadio Holden Appointments  Date Time Provider [facility] [place]   7/26/2018 2:00 PM Buster Ariza MD [address] [facility] [place] [address]        Pending Prescriptions Disp Refills    lovastatin (MEVACOR) 20 mg tablet 90 Tab 4     Sig: Take 1 Tab by mouth nightly.

## 2018-05-30 NOTE — TELEPHONE ENCOUNTER
He was due in April as I am not confident that lovastatin will provide needed control of cholesterol. However refill of 90 doses will last until July.

## 2018-07-18 LAB
ALBUMIN SERPL-MCNC: 4.2 G/DL (ref 3.5–4.8)
ALBUMIN/GLOB SERPL: 1.2 {RATIO} (ref 1.2–2.2)
ALP SERPL-CCNC: 78 IU/L (ref 39–117)
ALT SERPL-CCNC: 18 IU/L (ref 0–44)
AST SERPL-CCNC: 23 IU/L (ref 0–40)
BILIRUB SERPL-MCNC: 0.3 MG/DL (ref 0–1.2)
BUN SERPL-MCNC: 25 MG/DL (ref 8–27)
BUN/CREAT SERPL: 20 (ref 10–24)
CALCIUM SERPL-MCNC: 9.7 MG/DL (ref 8.6–10.2)
CHLORIDE SERPL-SCNC: 101 MMOL/L (ref 96–106)
CHOLEST SERPL-MCNC: 144 MG/DL (ref 100–199)
CO2 SERPL-SCNC: 20 MMOL/L (ref 20–29)
CREAT SERPL-MCNC: 1.28 MG/DL (ref 0.76–1.27)
EST. AVERAGE GLUCOSE BLD GHB EST-MCNC: 131 MG/DL
GLOBULIN SER CALC-MCNC: 3.4 G/DL (ref 1.5–4.5)
GLUCOSE SERPL-MCNC: 137 MG/DL (ref 65–99)
HBA1C MFR BLD: 6.2 % (ref 4.8–5.6)
HDLC SERPL-MCNC: 33 MG/DL
INTERPRETATION, 910389: NORMAL
INTERPRETATION: NORMAL
LDLC SERPL CALC-MCNC: 81 MG/DL (ref 0–99)
PDF IMAGE, 910387: NORMAL
POTASSIUM SERPL-SCNC: 4.3 MMOL/L (ref 3.5–5.2)
PROT SERPL-MCNC: 7.6 G/DL (ref 6–8.5)
SODIUM SERPL-SCNC: 138 MMOL/L (ref 134–144)
TRIGL SERPL-MCNC: 150 MG/DL (ref 0–149)
TSH SERPL DL<=0.005 MIU/L-ACNC: 0.71 UIU/ML (ref 0.45–4.5)
VLDLC SERPL CALC-MCNC: 30 MG/DL (ref 5–40)

## 2018-07-26 ENCOUNTER — OFFICE VISIT (OUTPATIENT)
Dept: INTERNAL MEDICINE CLINIC | Facility: CLINIC | Age: 78
End: 2018-07-26

## 2018-07-26 VITALS
RESPIRATION RATE: 20 BRPM | BODY MASS INDEX: 25.62 KG/M2 | HEART RATE: 64 BPM | OXYGEN SATURATION: 95 % | DIASTOLIC BLOOD PRESSURE: 58 MMHG | TEMPERATURE: 98 F | SYSTOLIC BLOOD PRESSURE: 142 MMHG | WEIGHT: 183 LBS | HEIGHT: 71 IN

## 2018-07-26 DIAGNOSIS — Z86.79 ATRIAL FIBRILLATION, CURRENTLY IN SINUS RHYTHM: ICD-10-CM

## 2018-07-26 DIAGNOSIS — R73.02 GLUCOSE INTOLERANCE (IMPAIRED GLUCOSE TOLERANCE): ICD-10-CM

## 2018-07-26 DIAGNOSIS — I10 ESSENTIAL HYPERTENSION: ICD-10-CM

## 2018-07-26 DIAGNOSIS — Z71.89 ADVANCE DIRECTIVE DISCUSSED WITH PATIENT: ICD-10-CM

## 2018-07-26 DIAGNOSIS — Z13.31 SCREENING FOR DEPRESSION: ICD-10-CM

## 2018-07-26 DIAGNOSIS — Z00.00 MEDICARE ANNUAL WELLNESS VISIT, SUBSEQUENT: Primary | ICD-10-CM

## 2018-07-26 DIAGNOSIS — E03.4 HYPOTHYROIDISM DUE TO ACQUIRED ATROPHY OF THYROID: ICD-10-CM

## 2018-07-26 DIAGNOSIS — E78.00 HYPERCHOLESTEROLEMIA: ICD-10-CM

## 2018-07-26 NOTE — PROGRESS NOTES
Bunny Atkins  Identified pt with two pt identifiers(name and ). Chief Complaint   Patient presents with    Blood Pressure Check    Cholesterol Problem    Blood sugar problem       Reviewed record In preparation for visit and have obtained necessary documentation. Advanced directive / living will on file. 1. Have you been to the ER, urgent care clinic or hospitalized since your last visit? No     2. Have you seen or consulted any other health care providers outside of the [facility] since your last visit? Include any pap smears or colon screening. No    Vitals reviewed with provider. Health Maintenance reviewed:     Health Maintenance Due   Topic    MEDICARE YEARLY EXAM           Wt Readings from Last 3 Encounters:   18 183 lb (83 kg)   18 186 lb (84.4 kg)   17 175 lb (79.4 kg)        Temp Readings from Last 3 Encounters:   18 98 °F (36.7 °C) (Oral)   18 96.8 °F (36 °C) (Oral)   17 98.4 °F (36.9 °C) (Oral)        BP Readings from Last 3 Encounters:   18 146/74   18 143/63   17 127/71        Pulse Readings from Last 3 Encounters:   18 64   18 62   17 64        Vitals:    18 1447   BP: 146/74   Pulse: 64   Resp: 20   Temp: 98 °F (36.7 °C)   TempSrc: Oral   SpO2: 95%   Weight: 183 lb (83 kg)   Height: 5' 11\" (1.803 m)   PainSc:   0 - No pain          Learning Assessment:   :       Learning Assessment 2014   PRIMARY LEARNER Patient   PRIMARY LANGUAGE ENGLISH   LEARNER PREFERENCE PRIMARY DEMONSTRATION   ANSWERED BY patient   RELATIONSHIP SELF        Depression Screening:   :       PHQ over the last two weeks 2018   Little interest or pleasure in doing things Not at all   Feeling down, depressed, irritable, or hopeless Not at all   Total Score PHQ 2 0        Fall Risk Assessment:   :       Fall Risk Assessment, last 12 mths 2018   Able to walk? Yes   Fall in past 12 months?  No        Abuse Screening:   :       Abuse Screening Questionnaire 2/2/2018 7/5/2016 11/13/2014   Do you ever feel afraid of your partner? N N N   Are you in a relationship with someone who physically or mentally threatens you? N N N   Is it safe for you to go home?  Y Y Y        ADL Screening:   :       ADL Assessment 11/13/2014   Feeding yourself No Help Needed   Getting from bed to chair No Help Needed   Getting dressed No Help Needed   Bathing or showering No Help Needed   Walk across the room (includes cane/walker) No Help Needed   Using the telphone No Help Needed   Taking your medications No Help Needed   Preparing meals No Help Needed   Managing money (expenses/bills) No Help Needed   Moderately strenuous housework (laundry) No Help Needed   Shopping for personal items (toiletries/medicines) No Help Needed   Shopping for groceries No Help Needed   Driving No Help Needed   Climbing a flight of stairs No Help Needed   Getting to places beyond walking distances No Help Needed

## 2018-07-26 NOTE — PROGRESS NOTES
This is the Subsequent Medicare Annual Wellness Exam, performed 12 months or more after the Initial AWV or the last Subsequent AWV    I have reviewed the patient's medical history in detail and updated the computerized patient record. 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 90 Tab 0    allopurinol (ZYLOPRIM) 300 mg tablet Take 1 Tab by mouth daily. 90 Tab 3    levothyroxine (SYNTHROID) 75 mcg tablet Take 1 Tab by mouth Daily (before breakfast). For thyroid 90 Tab 0    amLODIPine (NORVASC) 10 mg tablet Take 1 tablet daily. 90 Tab 3    hydroCHLOROthiazide (MICROZIDE) 12.5 mg capsule Take 1 Cap by mouth daily. 90 Cap 3    benazepril (LOTENSIN) 40 mg tablet TAKE 1 TABLET DAILY 90 Tab 3    aspirin delayed-release 81 mg tablet Take 1 Tab by mouth daily. 90 Tab 3    metoprolol succinate (TOPROL-XL) 50 mg XL tablet Take 50 mg by mouth two (2) times a day.        Allergies   Allergen Reactions    Bee Sting [Sting, Bee] Anaphylaxis    Pcn [Penicillins] Swelling     Family History   Problem Relation Age of Onset    Hypertension Mother     Stroke Mother     Heart Disease Brother      late 61    Cancer Brother      Lung    Stroke Father     Cancer Sister      Lung     Social History   Substance Use Topics    Smoking status: Former Smoker     Packs/day: 1.00     Years: 16.00     Quit date: 3/12/1988    Smokeless tobacco: Never Used   [address] [place] [address] Alcohol use No     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 Functional Ability and Level of Safety:   Hearing Loss  Ha some hearing loss; hearing aids did not help    Activities of Daily Living  The home contains: handrails and rugs  Patient does total self care    Fall Risk  Fall Risk Assessment, last 12 mths 7/26/2018   Able to walk? Yes   Fall in past 12 months?  No       Abuse Screen  Patient is not abused    Cognitive Screening   Evaluation of Cognitive Function:  Has your family/caregiver stated any concerns about your memory: no  Normal  Three word recall:  Immediate    3/3      Delayed   3/3          Patient Care Team   Patient Care Team:  Brooke Randle MD as PCP - General (Internal Medicine)  Elijah Medina MD (Cardiology)  Leda Clements MD as Consulting Provider (Endocrinology)  Leda Clements MD as Consulting Provider (Endocrinology)    Assessment/Plan   Education and counseling provided:  Are appropriate based on today's review and evaluation        Health Maintenance Due   Topic Date Due    MEDICARE Alejos Rash  03/14/2018

## 2018-07-26 NOTE — PATIENT INSTRUCTIONS
Office visit in 6 months with non-fasting lab work a week before visit. The best way to stay healthy is to live a healthy lifestyle. A healthy lifestyle includes regular exercise, eating a well-balanced diet, keeping a healthy weight and not smoking. Regular physical exams and screening tests are another important way to take care of yourself. Preventive exams provided by health care providers can find health problems early when treatment works best and can keep you from getting certain diseases or illnesses. Preventive services include exams, lab tests, screenings, shots, monitoring and information to help you take care of your own health. All people over 65 should have a pneumonia shot. Pneumonia shots are usually only needed once in a lifetime unless your doctor decides differently. In addition to your physical exam, some screening tests are recommended: 
 
All people over 65 should have a yearly flu shot. People over 65 are at medium to high risk for Hepatitis B. Three shots are needed for complete protection. Bone mass measurement (dexa scan) is recommended every two years. Diabetes Mellitus screening is recommended every year. Glaucoma is an eye disease caused by high pressure in the eye. An eye exam is recommended every year. Cardiovascular screening tests that check your cholesterol and other blood fat (lipid) levels are recommended every five years. Colorectal Cancer screening tests help to find pre-cancerous polyps (growths in the colon) so they can be removed before they turn into cancer. Tests ordered for screening depend on your personal and family history risk factors. Prostate Cancer Screening (annually up to age 76) Screening for breast cancer is recommended yearly with a Mammogram. 
 
Screening for cervical and vaginal cancer is recommended with a pelvic and Pap test every two years.  However if you have had an abnormal pap in the past  three years or at high risk for cervical or vaginal cancer Medicare will cover a pap test and a pelvic exam every year. Here is a list of your current Health Maintenance items with a due date: 
Health Maintenance Due Topic Date Due  
 Annual Well Visit  03/14/2018 Well Visit, Over 72: Care Instructions Your Care Instructions Physical exams can help you stay healthy. Your doctor has checked your overall health and may have suggested ways to take good care of yourself. He or she also may have recommended tests. At home, you can help prevent illness with healthy eating, regular exercise, and other steps. Follow-up care is a key part of your treatment and safety. Be sure to make and go to all appointments, and call your doctor if you are having problems. It's also a good idea to know your test results and keep a list of the medicines you take. How can you care for yourself at home? · Reach and stay at a healthy weight. This will lower your risk for many problems, such as obesity, diabetes, heart disease, and high blood pressure. · Get at least 30 minutes of exercise on most days of the week. Walking is a good choice. You also may want to do other activities, such as running, swimming, cycling, or playing tennis or team sports. · Do not smoke. Smoking can make health problems worse. If you need help quitting, talk to your doctor about stop-smoking programs and medicines. These can increase your chances of quitting for good. · Protect your skin from too much sun. When you're outdoors from 10 a.m. to 4 p.m., stay in the shade or cover up with clothing and a hat with a wide brim. Wear sunglasses that block UV rays. Even when it's cloudy, put broad-spectrum sunscreen (SPF 30 or higher) on any exposed skin. · See a dentist one or two times a year for checkups and to have your teeth cleaned. · Wear a seat belt in the car. · Limit alcohol to 2 drinks a day for men and 1 drink a day for women.  Too much alcohol can cause health problems. Follow your doctor's advice about when to have certain tests. These tests can spot problems early. For men and women · Cholesterol. Your doctor will tell you how often to have this done based on your overall health and other things that can increase your risk for heart attack and stroke. · Blood pressure. Have your blood pressure checked during a routine doctor visit. Your doctor will tell you how often to check your blood pressure based on your age, your blood pressure results, and other factors. · Diabetes. Ask your doctor whether you should have tests for diabetes. · Vision. Experts recommend that you have yearly exams for glaucoma and other age-related eye problems. · Hearing. Tell your doctor if you notice any change in your hearing. You can have tests to find out how well you hear. · Colon cancer tests. Keep having colon cancer tests as your doctor recommends. You can have one of several types of tests. · Heart attack and stroke risk. At least every 4 to 6 years, you should have your risk for heart attack and stroke assessed. Your doctor uses factors such as your age, blood pressure, cholesterol, and whether you smoke or have diabetes to show what your risk for a heart attack or stroke is over the next 10 years. · Osteoporosis. Talk to your doctor about whether you should have a bone density test to find out whether you have thinning bones. Also ask your doctor about whether you should take calcium and vitamin D supplements. For women · Pap test and pelvic exam. You may no longer need a Pap test. Talk with your doctor about whether to stop or continue to have Pap tests. · Breast exam and mammogram. Ask how often you should have a mammogram, which is an X-ray of your breasts. A mammogram can spot breast cancer before it can be felt and when it is easiest to treat. · Thyroid disease.  Talk to your doctor about whether to have your thyroid checked as part of a regular physical exam. Women have an increased chance of a thyroid problem. For men · Prostate exam. Talk to your doctor about whether you should have a blood test (called a PSA test) for prostate cancer. Experts disagree on whether men should have this test. Some experts recommend that you discuss the benefits and risks of the test with your doctor. · Abdominal aortic aneurysm. Ask your doctor whether you should have a test to check for an aneurysm. You may need a test if you ever smoked or if your parent, brother, sister, or child has had an aneurysm. When should you call for help? Watch closely for changes in your health, and be sure to contact your doctor if you have any problems or symptoms that concern you. Where can you learn more? Go to http://hai-shanti.info/. Enter P109 in the search box to learn more about \"Well Visit, Over 65: Care Instructions. \" Current as of: May 16, 2017 Content Version: 11.7 © 7851-1545 [employer]. Care instructions adapted under license by [employer] (which disclaims liability or warranty for this information). If you have questions about a medical condition or this instruction, always ask your healthcare professional. [place] [address] any warranty or liability for your use of this information.

## 2018-07-26 NOTE — PROGRESS NOTES
HISTORY OF PRESENT ILLNESS  Cal Guzman is a 68 y.o. male. HPI  He presents for follow up of hypertension, hyperlipidemia, Atrial Fibrillation and glucose intolerance. Diet and Lifestyle: generally follows a low fat low cholesterol diet, generally follows a low sodium diet, does not rigorously follow a diabetic diet, exercises regularly, nonsmoker  Medication compliance: compliant all of the time  Medication side effects: none  Home BP Monitorin-130/60-70  Cardiovascular ROS:   He denies palpitations, orthopnea, exertional chest pressure/discomfort, claudication, lower extremity edema, dyspnea on exertion, dizziness     He presents for follow up of hypothyroidism. Patient denies weight changes, heat / cold intolerance, change in energy level,   Course to date has been well controlled.   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    Advance directive discussed with patient Z70.80    Combined forms of 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 eye H25.811    Combined forms of age-related cataract of left eye H25.812     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     Past Surgical History:   Procedure Laterality Date    HX CATARACT REMOVAL Right 2016    HX HEENT      nose polyps    HX ORTHOPAEDIC      L elbow surgery     Social History     Social History    Marital status:      Spouse name: N/A    Number of children: N/A    Years of education: N/A     Social History Main Topics    Smoking status: Former Smoker     Packs/day: 1.00     Years: 16.00     Quit date: 3/12/1988    Smokeless tobacco: Never Used    Alcohol use No    Drug use: Yes     Special: Prescription, Marijuana      Comment: \"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 90 Tab 0    allopurinol (ZYLOPRIM) 300 mg tablet Take 1 Tab by mouth daily. 90 Tab 3    levothyroxine (SYNTHROID) 75 mcg tablet Take 1 Tab by mouth Daily (before breakfast). For thyroid 90 Tab 0    amLODIPine (NORVASC) 10 mg tablet Take 1 tablet daily. 90 Tab 3    hydroCHLOROthiazide (MICROZIDE) 12.5 mg capsule Take 1 Cap by mouth daily. 90 Cap 3    benazepril (LOTENSIN) 40 mg tablet TAKE 1 TABLET DAILY 90 Tab 3    aspirin delayed-release 81 mg tablet Take 1 Tab by mouth daily. 90 Tab 3    metoprolol succinate (TOPROL-XL) 50 mg XL tablet Take 50 mg by mouth two (2) times a day. Review of Systems   Constitutional: Negative for malaise/fatigue and weight loss. Gastrointestinal: Negative for constipation and diarrhea. Musculoskeletal: Positive for joint pain (left knee). Negative for back pain. Neurological: Positive for loss of consciousness. Negative for dizziness, tingling and focal weakness.      Visit Vitals    /58 (BP 1 Location: Left leg, BP Patient Position: Sitting)    Pulse 64    Temp 98 °F (36.7 °C) (Oral)    Resp 20    Ht 5' 11\" (1.803 m)    Wt 183 lb (83 kg)    SpO2 95%    BMI 25.52 kg/m2     Physical Exam   Constitutional: He is oriented to person, place, and time. He appears well-developed and well-nourished. HENT:   Head: Normocephalic and atraumatic. Eyes: Conjunctivae are normal. Pupils are equal, round, and reactive to light. Neck: Neck supple. Carotid bruit is not present. No thyromegaly present. Cardiovascular: Normal rate, regular rhythm and normal heart sounds. PMI is not displaced. Exam reveals no gallop. No murmur heard. Pulses:       Dorsalis pedis pulses are 2+ on the right side, and 2+ on the left side. Posterior tibial pulses are 2+ on the right side, and 2+ on the left side. Pulmonary/Chest: Effort normal. He has no wheezes. He has no rhonchi. He has no rales. Abdominal: Soft. Normal appearance. He exhibits no abdominal bruit and no mass. There is no hepatosplenomegaly. There is no tenderness. Musculoskeletal: He exhibits no edema. Lymphadenopathy:     He has no cervical adenopathy. Right: No supraclavicular adenopathy present. Left: No supraclavicular adenopathy present. Neurological: He is alert and oriented to person, place, and time. No sensory deficit. Skin: Skin is warm, dry and intact. No rash noted. Psychiatric: He has a normal mood and affect. His behavior is normal.   Nursing note and vitals reviewed.     Results for orders placed or performed in visit on 72/17/20   METABOLIC PANEL, COMPREHENSIVE   Result Value Ref Range    Glucose 137 (H) 65 - 99 mg/dL    BUN 25 8 - 27 mg/dL    Creatinine 1.28 (H) 0.76 - 1.27 mg/dL    GFR est non-AA 54 (L) >59 mL/min/1.73    GFR est AA 62 >59 mL/min/1.73    BUN/Creatinine ratio 20 10 - 24    Sodium 138 134 - 144 mmol/L    Potassium 4.3 3.5 - 5.2 mmol/L    Chloride 101 96 - 106 mmol/L    CO2 20 20 - 29 mmol/L    Calcium 9.7 8.6 - 10.2 mg/dL    Protein, total 7.6 6.0 - 8.5 g/dL    Albumin 4.2 3.5 - 4.8 g/dL    GLOBULIN, TOTAL 3.4 1.5 - 4.5 g/dL    A-G Ratio 1.2 1.2 - 2.2    Bilirubin, total 0.3 0.0 - 1.2 mg/dL    Alk. phosphatase 78 39 - 117 IU/L    AST (SGOT) 23 0 - 40 IU/L    ALT (SGPT) 18 0 - 44 IU/L   LIPID PANEL   Result Value Ref Range    Cholesterol, total 144 100 - 199 mg/dL    Triglyceride 150 (H) 0 - 149 mg/dL    HDL Cholesterol 33 (L) >39 mg/dL    VLDL, calculated 30 5 - 40 mg/dL    LDL, calculated 81 0 - 99 mg/dL   CVD REPORT   Result Value Ref Range    INTERPRETATION Note     PDF IMAGE Not applicable    CKD REPORT   Result Value Ref Range    Interpretation Note    HEMOGLOBIN A1C WITH EAG   Result Value Ref Range    Hemoglobin A1c 6.2 (H) 4.8 - 5.6 %    Estimated average glucose 131 mg/dL   TSH 3RD GENERATION   Result Value Ref Range    TSH 0.707 0.450 - 4.500 uIU/mL       ASSESSMENT and PLAN    ICD-10-CM ICD-9-CM    1. Medicare annual wellness visit, subsequent Z00.00 V70.0    2. Advance directive discussed with patient Z71.89 V65.49    3. Essential hypertension Y87 272.7 METABOLIC PANEL, BASIC   4. Glucose intolerance (impaired glucose tolerance) R73.02 790.22 HEMOGLOBIN A1C WITH EAG   5. Hypercholesterolemia E78.00 272.0    6. 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 Atrial fibrillation, currently in sinus rhythm Z86.79 427.31    8. Screening for depression Z13.89 V79.0 [place] DEPRESSION SCREEN ANNUAL     Diagnoses and all orders for this visit:    1. Medicare annual wellness visit, subsequent    2. Advance directive discussed with patient    3. Essential hypertension  Blood pressure is at goal   -     METABOLIC PANEL, BASIC; Future    4. Glucose intolerance (impaired glucose tolerance)  A1c is at his prior value of 6.2 in February.   -     HEMOGLOBIN A1C WITH EAG; Future    5. Hypercholesterolemia  Hyperlipidemia is controlled     6. Hypothyroidism due to acquired atrophy of thyroid  Euthyroid on replacement. 7. Atrial fibrillation, currently in sinus rhythm  Has been in sinus for many years without therapy. No anticoagulation    8. Screening for depression--Negative  -     [place] DEPRESSION SCREEN ANNUAL      Follow-up Disposition:  Return in about 6 months (around 1/26/2019) for HTN, chol, gluc, thyroid  Non-fasting lab one week prior. lab results and schedule of future lab studies reviewed with patient  reviewed diet, exercise and weight control  cardiovascular risk and specific lipid/LDL goals reviewed  I have discussed the diagnosis, evaluation and treatment options and the intended plan with the patient. Patient understands and is in agreement. The patient has received an after-visit summary and questions were answered concerning future plans. I have discussed side effects and warnings of any new medications with the patient as well.

## 2018-07-26 NOTE — MR AVS SNAPSHOT
[address] [place] [address] [phone] Patient: Carolyn Dowell MRN: [id number] Visit Information Date & Time Provider Department Dept. Phone Encounter #  
 7/26/2018  2:30 PM Tova Lozada MD [facility] of [address] [place] [address] [id number] Follow-up Instructions Return in about 6 months (around 1/26/2019) for HTN, chol, gluc, thyroid  Non-fasting lab one week prior. Upcoming Health Maintenance Date Due  
 MEDICARE YEARLY EXAM 3/14/2018 Influenza Age 5 to Adult 8/1/2018 GLAUCOMA SCREENING Q2Y 7/3/2019 COLONOSCOPY 11/13/2024 DTaP/Tdap/Td series (2 - Td) 11/18/2024 Allergies as of 7/26/2018  Review Complete On: 7/26/2018 By: Tova Lozada MD  
  
 Severity Noted Reaction Type Reactions Bee Sting [Sting, Bee] High 03/12/2010   Systemic Anaphylaxis Pcn [Penicillins]  04/01/2010    Swelling Current Immunizations  Reviewed on 7/26/2018 Name Date Influenza High Dose Vaccine PF 10/16/2017, 10/17/2016, 9/21/2015, 10/9/2014 Influenza Vaccine 10/8/2013 Influenza Vaccine Split 9/27/2011 Pneumococcal Conjugate (PCV-13) 7/5/2016 TD Vaccine 1/1/1999 Tdap 11/18/2014 ZZZ-RETIRED (DO NOT USE) Pneumococcal Vaccine (Unspecified Type) 5/11/2012 Reviewed by Navarro Black LPN on 7/15/2676 at  2:41 PM  
You Were Diagnosed With   
  
 Codes Comments Medicare annual wellness visit, subsequent    -  Primary ICD-10-CM: Z00.00 ICD-9-CM: V70.0 Advance directive discussed with patient     ICD-10-CM: Z71.89 ICD-9-CM: V65.49 Essential hypertension     ICD-10-CM: I10 
ICD-9-CM: 401.9 Glucose intolerance (impaired glucose tolerance)     ICD-10-CM: R73.02 
ICD-9-CM: 790.22 Hypercholesterolemia     ICD-10-CM: E78.00 ICD-9-CM: 272.0 Hypothyroidism due to acquired atrophy of thyroid     ICD-10-CM: E03.4 ICD-9-CM: 244.8, 246.8 Atrial fibrillation, currently in sinus rhythm     ICD-10-CM: Z86.79 
ICD-9-CM: 427.31 Vitals BP Pulse Temp Resp Height(growth percentile) Weight(growth percentile) 142/58 (BP 1 Location: Left leg, BP Patient Position: Sitting) 64 98 °F (36.7 °C) (Oral) 20 5' 11\" (1.803 m) 183 lb (83 kg) SpO2 BMI Smoking Status 95% 25.52 kg/m2 Former Smoker Vitals History BMI and BSA Data Body Mass Index Body Surface Area 25.52 kg/m 2 2.04 m 2 Preferred Pharmacy Pharmacy Name Phone [address] [place], [place] [phone] Your Updated Medication List  
  
   
This list is accurate as of 7/26/18  3:25 PM.  Always use your most recent med list.  
  
  
  
  
 allopurinol 300 mg tablet Commonly known as:  Clementeen Hickey Take 1 Tab by mouth daily. amLODIPine 10 mg tablet Commonly known as:  Gertrude Fraction Take 1 tablet daily. aspirin delayed-release 81 mg tablet Take 1 Tab by mouth daily. benazepril 40 mg tablet Commonly known as:  LOTENSIN  
TAKE 1 TABLET DAILY  
  
 hydroCHLOROthiazide 12.5 mg capsule Commonly known as:  Amaryllis Cross Take 1 Cap by mouth daily. levothyroxine 75 mcg tablet Commonly known as:  SYNTHROID Take 1 Tab by mouth Daily (before breakfast). For thyroid  
  
 lovastatin 20 mg tablet Commonly known as:  MEVACOR Take 1 Tab by mouth nightly. metoprolol succinate 50 mg XL tablet Commonly known as:  TOPROL-XL Take 50 mg by mouth two (2) times a day. Follow-up Instructions Return in about 6 months (around 1/26/2019) for HTN, chol, gluc, thyroid  Non-fasting lab one week prior. To-Do List   
 01/26/2019 Lab:  HEMOGLOBIN A1C WITH EAG   
  
 01/26/2019 Lab:  METABOLIC PANEL, BASIC Patient Instructions Office visit in 6 months with non-fasting lab work a week before visit. The best way to stay healthy is to live a healthy lifestyle. A healthy lifestyle includes regular exercise, eating a well-balanced diet, keeping a healthy weight and not smoking. Regular physical exams and screening tests are another important way to take care of yourself. Preventive exams provided by health care providers can find health problems early when treatment works best and can keep you from getting certain diseases or illnesses. Preventive services include exams, lab tests, screenings, shots, monitoring and information to help you take care of your own health. All people over 65 should have a pneumonia shot. Pneumonia shots are usually only needed once in a lifetime unless your doctor decides differently. In addition to your physical exam, some screening tests are recommended: 
 
All people over 65 should have a yearly flu shot. People over 65 are at medium to high risk for Hepatitis B. Three shots are needed for complete protection. Bone mass measurement (dexa scan) is recommended every two years. Diabetes Mellitus screening is recommended every year. Glaucoma is an eye disease caused by high pressure in the eye. An eye exam is recommended every year. Cardiovascular screening tests that check your cholesterol and other blood fat (lipid) levels are recommended every five years. Colorectal Cancer screening tests help to find pre-cancerous polyps (growths in the colon) so they can be removed before they turn into cancer. Tests ordered for screening depend on your personal and family history risk factors. Prostate Cancer Screening (annually up to age 76) Screening for breast cancer is recommended yearly with a Mammogram. 
 
Screening for cervical and vaginal cancer is recommended with a pelvic and Pap test every two years.  However if you have had an abnormal pap in the past  three years or at high risk for cervical or vaginal cancer Medicare will cover a pap test and a pelvic exam every year. Here is a list of your current Health Maintenance items with a due date: 
Health Maintenance Due Topic Date Due  
 Annual Well Visit  03/14/2018 Well Visit, Over 72: Care Instructions Your Care Instructions Physical exams can help you stay healthy. Your doctor has checked your overall health and may have suggested ways to take good care of yourself. He or she also may have recommended tests. At home, you can help prevent illness with healthy eating, regular exercise, and other steps. Follow-up care is a key part of your treatment and safety. Be sure to make and go to all appointments, and call your doctor if you are having problems. It's also a good idea to know your test results and keep a list of the medicines you take. How can you care for yourself at home? · Reach and stay at a healthy weight. This will lower your risk for many problems, such as obesity, diabetes, heart disease, and high blood pressure. · Get at least 30 minutes of exercise on most days of the week. Walking is a good choice. You also may want to do other activities, such as running, swimming, cycling, or playing tennis or team sports. · Do not smoke. Smoking can make health problems worse. If you need help quitting, talk to your doctor about stop-smoking programs and medicines. These can increase your chances of quitting for good. · Protect your skin from too much sun. When you're outdoors from 10 a.m. to 4 p.m., stay in the shade or cover up with clothing and a hat with a wide brim. Wear sunglasses that block UV rays. Even when it's cloudy, put broad-spectrum sunscreen (SPF 30 or higher) on any exposed skin. · See a dentist one or two times a year for checkups and to have your teeth cleaned. · Wear a seat belt in the car. · Limit alcohol to 2 drinks a day for men and 1 drink a day for women. Too much alcohol can cause health problems. Follow your doctor's advice about when to have certain tests. These tests can spot problems early. For men and women · Cholesterol. Your doctor will tell you how often to have this done based on your overall health and other things that can increase your risk for heart attack and stroke. · Blood pressure. Have your blood pressure checked during a routine doctor visit. Your doctor will tell you how often to check your blood pressure based on your age, your blood pressure results, and other factors. · Diabetes. Ask your doctor whether you should have tests for diabetes. · Vision. Experts recommend that you have yearly exams for glaucoma and other age-related eye problems. · Hearing. Tell your doctor if you notice any change in your hearing. You can have tests to find out how well you hear. · Colon cancer tests. Keep having colon cancer tests as your doctor recommends. You can have one of several types of tests. · Heart attack and stroke risk. At least every 4 to 6 years, you should have your risk for heart attack and stroke assessed. Your doctor uses factors such as your age, blood pressure, cholesterol, and whether you smoke or have diabetes to show what your risk for a heart attack or stroke is over the next 10 years. · Osteoporosis. Talk to your doctor about whether you should have a bone density test to find out whether you have thinning bones. Also ask your doctor about whether you should take calcium and vitamin D supplements. For women · Pap test and pelvic exam. You may no longer need a Pap test. Talk with your doctor about whether to stop or continue to have Pap tests. · Breast exam and mammogram. Ask how often you should have a mammogram, which is an X-ray of your breasts. A mammogram can spot breast cancer before it can be felt and when it is easiest to treat. · Thyroid disease.  Talk to your doctor about whether to have your thyroid checked as part of a regular physical exam. Women have an increased chance of a thyroid problem. For men · Prostate exam. Talk to your doctor about whether you should have a blood test (called a PSA test) for prostate cancer. Experts disagree on whether men should have this test. Some experts recommend that you discuss the benefits and risks of the test with your doctor. · Abdominal aortic aneurysm. Ask your doctor whether you should have a test to check for an aneurysm. You may need a test if you ever smoked or if your parent, brother, sister, or child has had an aneurysm. When should you call for help? Watch closely for changes in your health, and be sure to contact your doctor if you have any problems or symptoms that concern you. Where can you learn more? Go to http://hai-shanti.info/. Enter Y359 in the search box to learn more about \"Well Visit, Over 65: Care Instructions. \" Current as of: May 16, 2017 Content Version: 11.7 © 2214-3726 [employer]. Care instructions adapted under license by [employer] (which disclaims liability or warranty for this information). If you have questions about a medical condition or this instruction, always ask your healthcare professional. [place] [address] any warranty or liability for your use of this information. Introducing [facility]! Dear Serina Perkins: 
Thank you for requesting a Glomera account. Our records indicate that you already have an active Glomera account. You can access your account anytime at [email or web address] Did you know that you can access your hospital and ER discharge instructions at any time in Glomera? You can also review all of your test results from your hospital stay or ER visit. Additional Information If you have questions, please visit the Frequently Asked Questions section of the Springest website at [email or web address] Remember, Springest is NOT to be used for urgent needs. For medical emergencies, dial 911. Now available from your iPhone and Android! Please provide this summary of care documentation to your next provider. Your primary care clinician is listed as [facility] [place]. If you have any questions after today's visit, please call [phone].

## 2018-08-12 RX ORDER — LEVOTHYROXINE SODIUM 75 UG/1
TABLET ORAL
Qty: 90 TAB | Refills: 3 | Status: SHIPPED | OUTPATIENT
Start: 2018-08-12 | End: 2019-08-05 | Stop reason: SDUPTHER

## 2018-08-12 NOTE — TELEPHONE ENCOUNTER
Will forward refill for levothyroxine to Dr. Branden Gr as patient is no longer under Dr. Rosario tavares.

## 2018-08-13 ENCOUNTER — TELEPHONE (OUTPATIENT)
Dept: ENDOCRINOLOGY | Age: 78
End: 2018-08-13

## 2018-08-13 NOTE — TELEPHONE ENCOUNTER
Patients wife Nataliya Acuna is calling to request a refill on patients thyroid medication. She stated that the pharmacy told her that they faxed a request but she wanted to follow up on it. Patient uses [address] in [place].

## 2018-08-13 NOTE — TELEPHONE ENCOUNTER
Advised wife that Nathaniel Montero sent the Rx yesterday, as she last checked his TSH in July. She expressed understanding.

## 2019-02-23 RX ORDER — BENAZEPRIL HYDROCHLORIDE 40 MG/1
TABLET ORAL
Qty: 90 TAB | Refills: 0 | Status: CANCELLED | OUTPATIENT
Start: 2019-02-23

## 2019-03-04 RX ORDER — AMLODIPINE BESYLATE 10 MG/1
TABLET ORAL
Qty: 90 TAB | Refills: 3 | Status: SHIPPED | OUTPATIENT
Start: 2019-03-04 | End: 2020-02-24

## 2019-03-31 DIAGNOSIS — E78.00 HYPERCHOLESTEROLEMIA: ICD-10-CM

## 2019-03-31 RX ORDER — LOVASTATIN 20 MG/1
TABLET ORAL
Qty: 90 TAB | Refills: 1 | Status: SHIPPED | OUTPATIENT
Start: 2019-03-31 | End: 2019-10-05 | Stop reason: SDUPTHER

## 2019-04-19 RX ORDER — HYDROCHLOROTHIAZIDE 12.5 MG/1
CAPSULE ORAL
Qty: 90 CAP | Refills: 0 | Status: SHIPPED | OUTPATIENT
Start: 2019-04-19 | End: 2019-07-13 | Stop reason: SDUPTHER

## 2019-07-14 RX ORDER — HYDROCHLOROTHIAZIDE 12.5 MG/1
CAPSULE ORAL
Qty: 90 CAP | Refills: 0 | Status: SHIPPED | OUTPATIENT
Start: 2019-07-14 | End: 2019-10-12 | Stop reason: SDUPTHER

## 2019-08-05 RX ORDER — LEVOTHYROXINE SODIUM 75 UG/1
TABLET ORAL
Qty: 90 TAB | Refills: 3 | Status: SHIPPED | OUTPATIENT
Start: 2019-08-05 | End: 2020-08-03

## 2019-08-07 DIAGNOSIS — R73.02 GLUCOSE INTOLERANCE (IMPAIRED GLUCOSE TOLERANCE): ICD-10-CM

## 2019-08-07 DIAGNOSIS — I10 ESSENTIAL HYPERTENSION: ICD-10-CM

## 2019-08-08 LAB
BUN SERPL-MCNC: 18 MG/DL (ref 8–27)
BUN/CREAT SERPL: 13 (ref 10–24)
CALCIUM SERPL-MCNC: 9.5 MG/DL (ref 8.6–10.2)
CHLORIDE SERPL-SCNC: 101 MMOL/L (ref 96–106)
CO2 SERPL-SCNC: 23 MMOL/L (ref 20–29)
CREAT SERPL-MCNC: 1.38 MG/DL (ref 0.76–1.27)
EST. AVERAGE GLUCOSE BLD GHB EST-MCNC: 131 MG/DL
GLUCOSE SERPL-MCNC: 99 MG/DL (ref 65–99)
HBA1C MFR BLD: 6.2 % (ref 4.8–5.6)
POTASSIUM SERPL-SCNC: 4.1 MMOL/L (ref 3.5–5.2)
SODIUM SERPL-SCNC: 136 MMOL/L (ref 134–144)

## 2019-08-13 NOTE — PROGRESS NOTES
HISTORY OF PRESENT ILLNESS  Devi Ross is a 66 y.o. male. HPI  He presents for follow up of hypertension, hyperlipidemia and glucose intoelrance. He has history of A fib. Diet and Lifestyle: generally follows a low fat low cholesterol diet, generally follows a low sodium diet, follows a diabetic diet regularly, does not rigorously follow a diabetic diet, exercises sporadically, nonsmoker  Medication compliance: compliant all of the time  Medication side effects: none  Home BP Monitorin-130's/60's  Cardiovascular ROS:  He denies palpitations, orthopnea, exertional chest pressure/discomfort, claudication, lower extremity edema, dyspnea on exertion, dizziness     He presents for follow up of hypothyroidism. Patient denies weight changes, heat / cold intolerance, change in energy level. Course to date has been well controlled. He presents for follow-up of gout. Past gout history: acute gouty arthritis. Current gout treatment: allopurinol (Zyloprim). Side effects of current therapy: none. Progress since last visit: gouty attacks are resolved. He reports no acute gout attacks since last clinic visit. Cecilia Godoy He is attempting to avoid high purine foods and alcohol.       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    Advance directive discussed with patient Z70.80    Combined forms of 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 eye H25.811    Combined forms of age-related cataract of left eye H25.812     Past Medical History:   Diagnosis Date    Afib ([address]     Arthritis     L hand and bilat knees    Glucose intolerance (pre-diabetes)     Gout     Hypercholesterolemia     Hypertension     Ill-defined condition gout    Nephrolithiasis     Thyroid disease     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     Past Surgical History:   Procedure Laterality Date    HX CATARACT REMOVAL Right 2016    HX HEENT      nose polyps    HX ORTHOPAEDIC      L elbow surgery     Social History     Socioeconomic History    Marital status:      Spouse name: Not on file    Number of children: Not on file    Years of education: Not on file    Highest education level: Not on file   Tobacco Use    Smoking status: Former Smoker     Packs/day: 1.00     Years: 16.00     Pack years: 16.00     Last attempt to quit: 3/12/1988     Years since quittin.4    Smokeless tobacco: Never Used   Substance and Sexual Activity    Alcohol use: No    Drug use: Yes     Types: Prescription, 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 90 Tab 3    hydroCHLOROthiazide (MICROZIDE) 12.5 mg capsule TAKE 1 CAPSULE BY MOUTH ONCE DAILY  **MUST MAKE DOCTOR APPOINTMENT FOR REFILLS** 90 Cap 0    allopurinol (ZYLOPRIM) 300 mg tablet TAKE 1 TABLET BY MOUTH ONCE DAILY 90 Tab 1    lovastatin (MEVACOR) 20 mg tablet TAKE 1 TABLET BY MOUTH NIGHTLY 90 Tab 1    amLODIPine (NORVASC) 10 mg tablet TAKE 1 TABLET BY MOUTH ONCE DAILY 90 Tab 3    benazepril (LOTENSIN) 40 mg tablet TAKE 1 TABLET BY MOUTH ONCE DAILY. MUST MAKE DOCTOR APPOINTMENT FOR REFILL. 90 Tab 1    aspirin delayed-release 81 mg tablet Take 1 Tab by mouth daily.  90 Tab 3    metoprolol succinate (TOPROL-XL) 50 mg XL tablet Take 50 mg by mouth two (2) times a day. Review of Systems   Constitutional: Negative for malaise/fatigue and weight loss. Gastrointestinal: Negative for constipation, diarrhea and heartburn. Musculoskeletal: Negative for back pain and joint pain. Neurological: Negative for dizziness, tingling and focal weakness. Visit Vitals  /70 (BP 1 Location: Left arm, BP Patient Position: Sitting)   Pulse (!) 48   Temp 97.6 °F (36.4 °C) (Oral)   Resp 12   Ht 5' 11\" (1.803 m)   Wt 181 lb 8 oz (82.3 kg)   SpO2 96%   BMI 25.31 kg/m²     Physical Exam   Constitutional: He is oriented to person, place, and time. He appears well-developed and well-nourished. HENT:   Head: Normocephalic and atraumatic. Eyes: Pupils are equal, round, and reactive to light. Conjunctivae are normal.   Neck: Neck supple. Carotid bruit is not present. No thyromegaly present. Cardiovascular: Normal rate, regular rhythm and normal heart sounds. PMI is not displaced. Exam reveals no gallop. No murmur heard. Pulses:       Dorsalis pedis pulses are 2+ on the right side, and 2+ on the left side. Posterior tibial pulses are 2+ on the right side, and 2+ on the left side. Pulmonary/Chest: Effort normal. He has no wheezes. He has no rhonchi. He has no rales. Abdominal: Soft. Normal appearance. He exhibits no abdominal bruit and no mass. There is no hepatosplenomegaly. There is no tenderness. Musculoskeletal: He exhibits no edema. Lymphadenopathy:     He has no cervical adenopathy. Right: No supraclavicular adenopathy present. Left: No supraclavicular adenopathy present. Neurological: He is alert and oriented to person, place, and time. No sensory deficit. Skin: Skin is warm, dry and intact. No rash noted. Psychiatric: He has a normal mood and affect. His behavior is normal.   Nursing note and vitals reviewed.     Results for orders placed or performed in visit on 08/07/19   HEMOGLOBIN A1C WITH EAG   Result Value Ref Range    Hemoglobin A1c 6.2 (H) 4.8 - 5.6 %    Estimated average glucose 531 mg/dL   METABOLIC PANEL, BASIC   Result Value Ref Range    Glucose 99 65 - 99 mg/dL    BUN 18 8 - 27 mg/dL    Creatinine 1.38 (H) 0.76 - 1.27 mg/dL    GFR est non-AA 49 (L) >59 mL/min/1.73    GFR est AA 56 (L) >59 mL/min/1.73    BUN/Creatinine ratio 13 10 - 24    Sodium 136 134 - 144 mmol/L    Potassium 4.1 3.5 - 5.2 mmol/L    Chloride 101 96 - 106 mmol/L    CO2 23 20 - 29 mmol/L    Calcium 9.5 8.6 - 10.2 mg/dL     Lab Results   Component Value Date/Time    TSH 0.707 07/16/2018 11:58 AM    T4, Free 1.43 07/14/2017 10:54 AM       Lab Results   Component Value Date/Time    Cholesterol, total 144 07/16/2018 11:58 AM    HDL Cholesterol 33 (L) 07/16/2018 11:58 AM    LDL, calculated 81 07/16/2018 11:58 AM    VLDL, calculated 30 07/16/2018 11:58 AM    Triglyceride 150 (H) 07/16/2018 11:58 AM    CHOL/HDL Ratio 5.8 (H) 05/14/2010 04:45 PM       ASSESSMENT and PLAN    ICD-10-CM ICD-9-CM    1. Essential hypertension W29 203.4 METABOLIC PANEL, BASIC   2. Hypercholesterolemia E78.00 272.0 LIPID PANEL   3. Glucose intolerance (impaired glucose tolerance) R73.02 790.22 HEMOGLOBIN A1C WITH EAG   4. Hypothyroidism due to acquired atrophy of thyroid E03.4 244.8 TSH 3RD GENERATION     246.8    5. Idiopathic gout of foot, unspecified chronicity, unspecified laterality M10.079 274.00    6. Screening for depression Z13.31 V79.0 AL DEPRESSION SCREEN ANNUAL     Diagnoses and all orders for this visit:    1. Essential hypertension  Hypertension is controlled. -     METABOLIC PANEL, BASIC; Future    2. Hypercholesterolemia  Hyperlipidemia is controlled  -     LIPID PANEL; Future    3. Glucose intolerance (impaired glucose tolerance)  A1c is stable at 6.2.   -     HEMOGLOBIN A1C WITH EAG; Future    4.  Hypothyroidism due to acquired atrophy of thyroid  Euthyroid on replacement.   -     TSH 3RD GENERATION; Future    5. Idiopathic gout of foot, unspecified chronicity, unspecified laterality  Well controlled     6. Screening for depression--negative  -     ND DEPRESSION SCREEN ANNUAL      Follow-up and Dispositions    · Return in about 6 months (around 2/14/2020) for HTN, Thyroid, gluc   Fasting lab one week prior . lab results and schedule of future lab studies reviewed with patient  reviewed diet, exercise and weight control  I have discussed the diagnosis, evaluation and treatment options and the intended plan with the patient. Patient understands and is in agreement. The patient has received an after-visit summary and questions were answered concerning future plans. I have discussed side effects and warnings of any new medications with the patient as well.

## 2019-08-14 ENCOUNTER — OFFICE VISIT (OUTPATIENT)
Dept: INTERNAL MEDICINE CLINIC | Facility: CLINIC | Age: 79
End: 2019-08-14

## 2019-08-14 VITALS
SYSTOLIC BLOOD PRESSURE: 135 MMHG | TEMPERATURE: 97.6 F | BODY MASS INDEX: 25.41 KG/M2 | HEIGHT: 71 IN | WEIGHT: 181.5 LBS | HEART RATE: 48 BPM | DIASTOLIC BLOOD PRESSURE: 70 MMHG | RESPIRATION RATE: 12 BRPM | OXYGEN SATURATION: 96 %

## 2019-08-14 DIAGNOSIS — E78.00 HYPERCHOLESTEROLEMIA: ICD-10-CM

## 2019-08-14 DIAGNOSIS — I10 ESSENTIAL HYPERTENSION: Primary | ICD-10-CM

## 2019-08-14 DIAGNOSIS — R73.02 GLUCOSE INTOLERANCE (IMPAIRED GLUCOSE TOLERANCE): ICD-10-CM

## 2019-08-14 DIAGNOSIS — E03.4 HYPOTHYROIDISM DUE TO ACQUIRED ATROPHY OF THYROID: ICD-10-CM

## 2019-08-14 DIAGNOSIS — Z13.31 SCREENING FOR DEPRESSION: ICD-10-CM

## 2019-08-14 DIAGNOSIS — M10.079 IDIOPATHIC GOUT OF FOOT, UNSPECIFIED CHRONICITY, UNSPECIFIED LATERALITY: ICD-10-CM

## 2019-08-14 NOTE — PATIENT INSTRUCTIONS
Remember to get your flu shot in September or October. You may call us for a nurse appointment or get this from your pharmacy. Office visit in 6 months with fasting lab work a week before visit.

## 2019-08-14 NOTE — PROGRESS NOTES
Katlin Licea  Identified pt with two pt identifiers(name and ). Chief Complaint   Patient presents with    Hypertension    Cholesterol Problem    Blood sugar problem    Thyroid Problem       Reviewed record In preparation for visit and have obtained necessary documentation. Advanced directive / living will on file. 1. Have you been to the ER, urgent care clinic or hospitalized since your last visit? No     2. Have you seen or consulted any other health care providers outside of the [address] [place] [address] since your last visit? Include any pap smears or colon screening. Dr Yu Mendoza reviewed with provider.     Health Maintenance reviewed:     Health Maintenance Due   Topic    Influenza Age 5 to Adult     MEDICARE YEARLY EXAM           Wt Readings from Last 3 Encounters:   19 181 lb 8 oz (82.3 kg)   18 183 lb (83 kg)   18 186 lb (84.4 kg)        Temp Readings from Last 3 Encounters:   19 97.6 °F (36.4 °C) (Oral)   18 98 °F (36.7 °C) (Oral)   18 96.8 °F (36 °C) (Oral)        BP Readings from Last 3 Encounters:   19 135/70   18 142/58   18 143/63        Pulse Readings from Last 3 Encounters:   19 (!) 48   18 64   18 62        Vitals:    19 1101   BP: 135/70   Pulse: (!) 48   Resp: 12   Temp: 97.6 °F (36.4 °C)   TempSrc: Oral   SpO2: 96%   Weight: 181 lb 8 oz (82.3 kg)   Height: 5' 11\" (1.803 m)   PainSc:   0 - No pain          Learning Assessment:   :       Learning Assessment 2014   PRIMARY LEARNER Patient   PRIMARY LANGUAGE ENGLISH   LEARNER PREFERENCE PRIMARY DEMONSTRATION   ANSWERED BY patient   RELATIONSHIP SELF        Depression Screening:   :       3 most recent PHQ Screens 2019   Little interest or pleasure in doing things Not at all   Feeling down, depressed, irritable, or hopeless Not at all   Total Score PHQ 2 0        Fall Risk Assessment:   :       Fall Risk Assessment, last 12 mths 2019 Able to walk? Yes   Fall in past 12 months? No        Abuse Screening:   :       Abuse Screening Questionnaire 8/14/2019 2/2/2018 7/5/2016 11/13/2014   Do you ever feel afraid of your partner? N N N N   Are you in a relationship with someone who physically or mentally threatens you? N N N N   Is it safe for you to go home?  Y Y Y Y        ADL Screening:   :       ADL Assessment 11/13/2014   Feeding yourself No Help Needed   Getting from bed to chair No Help Needed   Getting dressed No Help Needed   Bathing or showering No Help Needed   Walk across the room (includes cane/walker) No Help Needed   Using the telphone No Help Needed   Taking your medications No Help Needed   Preparing meals No Help Needed   Managing money (expenses/bills) No Help Needed   Moderately strenuous housework (laundry) No Help Needed   Shopping for personal items (toiletries/medicines) No Help Needed   Shopping for groceries No Help Needed   Driving No Help Needed   Climbing a flight of stairs No Help Needed   Getting to places beyond walking distances No Help Needed

## 2019-10-05 DIAGNOSIS — E78.00 HYPERCHOLESTEROLEMIA: ICD-10-CM

## 2019-10-05 RX ORDER — LOVASTATIN 20 MG/1
TABLET ORAL
Qty: 90 TAB | Refills: 1 | Status: SHIPPED | OUTPATIENT
Start: 2019-10-05 | End: 2020-01-03

## 2019-10-20 RX ORDER — ALLOPURINOL 300 MG/1
TABLET ORAL
Qty: 90 TAB | Refills: 1 | Status: SHIPPED | OUTPATIENT
Start: 2019-10-20 | End: 2020-04-26

## 2019-11-05 ENCOUNTER — DOCUMENTATION ONLY (OUTPATIENT)
Dept: INTERNAL MEDICINE CLINIC | Facility: CLINIC | Age: 79
End: 2019-11-05

## 2020-02-24 RX ORDER — AMLODIPINE BESYLATE 10 MG/1
TABLET ORAL
Qty: 90 TAB | Refills: 0 | Status: SHIPPED | OUTPATIENT
Start: 2020-02-24 | End: 2020-05-26

## 2020-04-05 RX ORDER — HYDROCHLOROTHIAZIDE 12.5 MG/1
CAPSULE ORAL
Qty: 90 CAP | Refills: 0 | Status: SHIPPED | OUTPATIENT
Start: 2020-04-05 | End: 2020-07-12

## 2020-04-07 ENCOUNTER — VIRTUAL VISIT (OUTPATIENT)
Dept: INTERNAL MEDICINE CLINIC | Facility: CLINIC | Age: 80
End: 2020-04-07

## 2020-04-07 DIAGNOSIS — Z13.31 SCREENING FOR DEPRESSION: ICD-10-CM

## 2020-04-07 DIAGNOSIS — Z71.89 ADVANCE DIRECTIVE DISCUSSED WITH PATIENT: ICD-10-CM

## 2020-04-07 DIAGNOSIS — Z00.00 MEDICARE ANNUAL WELLNESS VISIT, SUBSEQUENT: Primary | ICD-10-CM

## 2020-04-07 NOTE — PROGRESS NOTES
Consent: Chapo Higgins, who was seen by synchronous (real-time) audio-video technology, and/or his healthcare decision maker, is aware that this patient-initiated, Telehealth encounter on 4/7/2020 is a billable service, with coverage as determined by his insurance carrier. He is aware that he may receive a bill and has provided verbal consent to proceed: Yes. Assessment & Plan:   Diagnoses and all orders for this visit:    1. Medicare annual wellness visit, subsequent    2. Advance directive discussed with patient    3. Screening for depression-negative  -     [place] DEPRESSION SCREEN ANNUAL            712  Subjective:   Chapo Higgins is a 78 y.o. male who was seen for Annual Wellness Visit    See separate note    Review of Systems           Objective:   Vital Signs: (As obtained by patient/caregiver at home)  There were no vitals taken for this visit.      [INSTRUCTIONS:  \"[x]\" Indicates a positive item  \"[]\" Indicates a negative item  -- DELETE ALL ITEMS NOT EXAMINED]    Constitutional: [x] Appears well-developed and well-nourished [x] No apparent distress      [] Abnormal -     Mental status: [x] Alert and awake  [x] Oriented to person/place/time [x] Able to follow commands    [] Abnormal -     Eyes:   EOM    [x]  Normal    [] Abnormal -   Sclera  [x]  Normal    [] Abnormal -          Discharge [x]  None visible   [] Abnormal -     HENT: [x] Normocephalic, atraumatic  [] Abnormal -   [x] Mouth/Throat: Mucous membranes are moist    External Ears [x] Normal  [] Abnormal -    Neck: [x] No visualized mass [] Abnormal -     Pulmonary/Chest: [x] Respiratory effort normal   [x] No visualized signs of difficulty breathing or respiratory distress        [] Abnormal -      Musculoskeletal:   [x] Normal gait with no signs of ataxia         [x] Normal range of motion of neck        [] Abnormal -     Neurological:        [x] No Facial Asymmetry (Cranial nerve 7 motor function) (limited exam due to video visit) [x] No gaze palsy        [] Abnormal -          Skin:        [x] No significant exanthematous lesions or discoloration noted on facial skin         [] Abnormal -            Psychiatric:       [x] Normal Affect [] Abnormal -        [x] No Hallucinations    Other pertinent observable physical exam findings:-    Lab Results   Component Value Date/Time    Hemoglobin A1c 6.2 (H) 08/07/2019 10:36 AM    Hemoglobin A1c (POC) 6.2 (A) 02/02/2018 02:16 PM     Lab Results   Component Value Date/Time    TSH 0.707 07/16/2018 11:58 AM    T4, Free 1.43 07/14/2017 10:54 AM       Lab Results   Component Value Date/Time    Cholesterol, total 144 07/16/2018 11:58 AM    HDL Cholesterol 33 (L) 07/16/2018 11:58 AM    LDL, calculated 81 07/16/2018 11:58 AM    VLDL, calculated 30 07/16/2018 11:58 AM    Triglyceride 150 (H) 07/16/2018 11:58 AM    CHOL/HDL Ratio 5.8 (H) 05/14/2010 04:45 PM     Lab Results   Component Value Date/Time    Sodium 136 08/07/2019 10:36 AM    Potassium 4.1 08/07/2019 10:36 AM    Chloride 101 08/07/2019 10:36 AM    CO2 23 08/07/2019 10:36 AM    Anion gap 9 04/17/2016 06:19 PM    Glucose 99 08/07/2019 10:36 AM    BUN 18 08/07/2019 10:36 AM    Creatinine 1.38 (H) 08/07/2019 10:36 AM    BUN/Creatinine ratio 13 08/07/2019 10:36 AM    GFR est AA 56 (L) 08/07/2019 10:36 AM    GFR est non-AA 49 (L) 08/07/2019 10:36 AM    Calcium 9.5 08/07/2019 10:36 AM    Bilirubin, total 0.3 07/16/2018 11:58 AM    AST (SGOT) 23 07/16/2018 11:58 AM    Alk. phosphatase 78 07/16/2018 11:58 AM    Protein, total 7.6 07/16/2018 11:58 AM    Albumin 4.2 07/16/2018 11:58 AM    Globulin 4.3 (H) 04/17/2016 06:19 PM    A-G Ratio 1.2 07/16/2018 11:58 AM    ALT (SGPT) 18 07/16/2018 11:58 AM         We discussed the expected course, resolution and complications of the diagnosis(es) in detail. Medication risks, benefits, costs, interactions, and alternatives were discussed as indicated.   I advised him to contact the office if his condition worsens, changes or fails to improve as anticipated. He expressed understanding with the diagnosis(es) and plan. Patty Prater is a 78 y.o. male being evaluated by a video visit encounter for concerns as above. A caregiver was present when appropriate. Due to this being a TeleHealth encounter (During TOGVV-35 public health emergency), evaluation of the following organ systems was limited: Vitals/Constitutional/EENT/Resp/CV/GI//MS/Neuro/Skin/Heme-Lymph-Imm. Pursuant to the emergency declaration under the [facility], [facility] waiver authority and the [employer] Act, this Virtual  Visit was conducted, with patient's (and/or legal guardian's) consent, to reduce the patient's risk of exposure to COVID-19 and provide necessary medical care. Services were provided through a video synchronous discussion virtually to substitute for in-person clinic visit. Patient was  located in his home and physician in her office.         Roger Adler MD

## 2020-04-07 NOTE — PROGRESS NOTES
Michelle Calles  Identified pt with two pt identifiers(name and ). Chief Complaint   Patient presents with   [facility] Wellness Visit       Reviewed record In preparation for visit and have obtained necessary documentation. 1. Have you been to the ER, urgent care clinic or hospitalized since your last visit? No     2. Have you seen or consulted any other health care providers outside of the [address] [place] [address] since your last visit? Include any pap smears or colon screening. Dr. Alejandro Hart reviewed with provider. Health Maintenance reviewed:     Health Maintenance Due   Topic    Lipid Screen     Medicare Yearly Exam           Wt Readings from Last 3 Encounters:   19 181 lb 8 oz (82.3 kg)   18 183 lb (83 kg)   18 186 lb (84.4 kg)        Temp Readings from Last 3 Encounters:   19 97.6 °F (36.4 °C) (Oral)   18 98 °F (36.7 °C) (Oral)   18 96.8 °F (36 °C) (Oral)        BP Readings from Last 3 Encounters:   19 135/70   18 142/58   18 143/63        Pulse Readings from Last 3 Encounters:   19 (!) 48   18 64   18 62        Vitals:    20 1301   PainSc:   0 - No pain          Learning Assessment:   :       Learning Assessment 2014   PRIMARY LEARNER Patient   PRIMARY LANGUAGE ENGLISH   LEARNER PREFERENCE PRIMARY DEMONSTRATION   ANSWERED BY patient   RELATIONSHIP SELF        Depression Screening:   :       3 most recent PHQ Screens 2020   Little interest or pleasure in doing things Not at all   Feeling down, depressed, irritable, or hopeless Not at all   Total Score PHQ 2 0        Fall Risk Assessment:   :       Fall Risk Assessment, last 12 mths 2020   Able to walk? Yes   Fall in past 12 months? No        Abuse Screening:   :       Abuse Screening Questionnaire 2014   Do you ever feel afraid of your partner?  N N N N   Are you in a relationship with someone who physically or mentally threatens you? N N N N   Is it safe for you to go home?  Y Y Y Y        ADL Screening:   :       ADL Assessment 11/13/2014   Feeding yourself No Help Needed   Getting from bed to chair No Help Needed   Getting dressed No Help Needed   Bathing or showering No Help Needed   Walk across the room (includes cane/walker) No Help Needed   Using the telphone No Help Needed   Taking your medications No Help Needed   Preparing meals No Help Needed   Managing money (expenses/bills) No Help Needed   Moderately strenuous housework (laundry) No Help Needed   Shopping for personal items (toiletries/medicines) No Help Needed   Shopping for groceries No Help Needed   Driving No Help Needed   Climbing a flight of stairs No Help Needed   Getting to places beyond walking distances No Help Needed

## 2020-04-07 NOTE — PROGRESS NOTES
This is the Subsequent Medicare Annual Wellness Exam, performed 12 months or more after the Initial AWV or the last Subsequent AWV    I have reviewed the patient's medical history in detail and updated the computerized patient record. History     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    Advance directive discussed with patient Z70.80    Combined forms of 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 eye H25.811    Combined forms of age-related cataract of left eye H25.812     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 Must schedule doctor appointment for refill. 90 Tab 0    allopurinol (ZYLOPRIM) 300 mg tablet TAKE 1 TABLET BY MOUTH ONCE DAILY 90 Tab 1    benazepril (LOTENSIN) 40 mg tablet TAKE 1 TABLET BY MOUTH ONCE DAILY. MUST MAKE APPOINTMENT FOR REFILL.  90 Tab 1    levothyroxine (SYNTHROID) 75 mcg tablet TAKE 1 TABLET BY MOUTH BEFORE BREAKFAST ONCE DAILY FOR THYROID. 90 Tab 3    aspirin delayed-release 81 mg tablet Take 1 Tab by mouth daily. 90 Tab 3    metoprolol succinate (TOPROL-XL) 50 mg XL tablet Take 50 mg by mouth two (2) times a day. Allergies   Allergen Reactions    Bee Sting [Sting, Bee] Anaphylaxis    Pcn [Penicillins] Swelling       Family History   Problem Relation Age of Onset    Hypertension Mother     Stroke Mother     Heart Disease Brother         late 61    Cancer Brother         Lung    Stroke Father     Cancer Sister         Lung     Social History     Tobacco Use    Smoking status: Former Smoker     Packs/day: 1.00     Years: 16.00     Pack years: 16.00     Last attempt to quit: 3/12/1988     Years since quittin.0    Smokeless tobacco: Never Used   Substance Use Topics    Alcohol use: No       Depression Risk Factor Screening:     3 most recent PHQ Screens 2020   Little interest or pleasure in doing things Not at all   Feeling down, depressed, irritable, or hopeless Not at all   Total Score PHQ 2 0       Alcohol Risk Factor Screening (MALE > 65): Do you average more 1 drink per night or more than 7 drinks a week: No    In the past three months have you have had more than 4 drinks containing alcohol on one occasion: No      Functional Ability and Level of Safety:   Hearing: The patient wears hearing aids. Activities of Daily Living: The home contains: handrails  Patient does total self care    Ambulation: with no difficulty    Fall Risk:  Fall Risk Assessment, last 12 mths 2020   Able to walk? Yes   Fall in past 12 months?  No       Abuse Screen:  Patient is not abused    Cognitive Screening   Has your family/caregiver stated any concerns about your memory: no  Cognitive Screening: Normal - Verbal Fluency Test    Patient Care Team   Patient Care Team:  Nissa Soria MD as PCP - General (Internal Medicine)  Nissa Soria MD as PCP - [facility]  Myriam Tabares MD (Cardiology)  Bunny Shaw MD as Consulting Provider (Endocrinology)  Emily Szymanski MD as Consulting Provider (Endocrinology)    Assessment/Plan   Education and counseling provided:  Are appropriate based on today's review and evaluation        Health Maintenance Due   Topic Date Due    Lipid Screen  07/16/2019    Medicare Yearly Exam  07/27/2019

## 2020-04-12 RX ORDER — BENAZEPRIL HYDROCHLORIDE 40 MG/1
TABLET ORAL
Qty: 90 TAB | Refills: 0 | Status: SHIPPED | OUTPATIENT
Start: 2020-04-12 | End: 2020-07-05

## 2020-07-03 DIAGNOSIS — E78.00 HYPERCHOLESTEROLEMIA: ICD-10-CM

## 2020-07-03 RX ORDER — LOVASTATIN 20 MG/1
TABLET ORAL
Qty: 90 TAB | Refills: 0 | Status: SHIPPED | OUTPATIENT
Start: 2020-07-03 | End: 2020-10-05

## 2020-07-05 RX ORDER — BENAZEPRIL HYDROCHLORIDE 40 MG/1
TABLET ORAL
Qty: 90 TAB | Refills: 0 | Status: SHIPPED | OUTPATIENT
Start: 2020-07-05 | End: 2020-10-05

## 2020-07-12 RX ORDER — HYDROCHLOROTHIAZIDE 12.5 MG/1
CAPSULE ORAL
Qty: 90 CAP | Refills: 0 | Status: SHIPPED | OUTPATIENT
Start: 2020-07-12 | End: 2020-10-05

## 2020-08-03 RX ORDER — LEVOTHYROXINE SODIUM 75 UG/1
TABLET ORAL
Qty: 90 TAB | Refills: 0 | Status: SHIPPED | OUTPATIENT
Start: 2020-08-03 | End: 2020-08-04 | Stop reason: SDUPTHER

## 2020-08-04 RX ORDER — LEVOTHYROXINE SODIUM 75 UG/1
TABLET ORAL
Qty: 90 TAB | Refills: 1 | Status: SHIPPED | OUTPATIENT
Start: 2020-08-04 | End: 2021-04-26

## 2020-08-04 NOTE — TELEPHONE ENCOUNTER
REFILL     PCP: Shannan Miller MD     Last appt: Visit date not found   No future appointments.      Requested Prescriptions     Pending Prescriptions Disp Refills    levothyroxine (SYNTHROID) 75 mcg tablet 90 Tab      Sig: TAKE 1 TABLET BY MOUTH ONCE DAILY BEFORE BREAKFAST FOR THYROID

## 2020-08-04 NOTE — TELEPHONE ENCOUNTER
Pharmacy requesting synthroid 75 mcg tab 90 day supply     Take 1 tablet by mouth once daily before breakfast for thyroid

## 2020-08-23 RX ORDER — AMLODIPINE BESYLATE 10 MG/1
TABLET ORAL
Qty: 90 TAB | Refills: 0 | Status: SHIPPED | OUTPATIENT
Start: 2020-08-23 | End: 2020-09-20

## 2020-09-20 RX ORDER — AMLODIPINE BESYLATE 10 MG/1
TABLET ORAL
Qty: 90 TAB | Refills: 0 | Status: SHIPPED | OUTPATIENT
Start: 2020-09-20 | End: 2021-02-15

## 2020-10-04 DIAGNOSIS — E78.00 HYPERCHOLESTEROLEMIA: ICD-10-CM

## 2020-10-05 RX ORDER — BENAZEPRIL HYDROCHLORIDE 40 MG/1
TABLET ORAL
Qty: 90 TAB | Refills: 0 | Status: SHIPPED | OUTPATIENT
Start: 2020-10-05 | End: 2021-01-05 | Stop reason: SDUPTHER

## 2020-10-05 RX ORDER — LOVASTATIN 20 MG/1
TABLET ORAL
Qty: 90 TAB | Refills: 0 | Status: SHIPPED | OUTPATIENT
Start: 2020-10-05 | End: 2021-01-06

## 2020-10-05 RX ORDER — HYDROCHLOROTHIAZIDE 12.5 MG/1
CAPSULE ORAL
Qty: 90 CAP | Refills: 0 | Status: SHIPPED | OUTPATIENT
Start: 2020-10-05 | End: 2021-01-05 | Stop reason: SDUPTHER

## 2021-01-04 RX ORDER — BENAZEPRIL HYDROCHLORIDE 40 MG/1
TABLET ORAL
Qty: 90 TAB | Refills: 0 | OUTPATIENT
Start: 2021-01-04

## 2021-01-04 RX ORDER — HYDROCHLOROTHIAZIDE 12.5 MG/1
CAPSULE ORAL
Qty: 90 CAP | Refills: 0 | OUTPATIENT
Start: 2021-01-04

## 2021-01-05 RX ORDER — BENAZEPRIL HYDROCHLORIDE 40 MG/1
TABLET ORAL
Qty: 90 TAB | Refills: 0 | Status: SHIPPED | OUTPATIENT
Start: 2021-01-05 | End: 2021-04-05

## 2021-01-05 RX ORDER — HYDROCHLOROTHIAZIDE 12.5 MG/1
CAPSULE ORAL
Qty: 90 CAP | Refills: 0 | Status: SHIPPED | OUTPATIENT
Start: 2021-01-05 | End: 2021-04-05

## 2021-01-06 DIAGNOSIS — E78.00 HYPERCHOLESTEROLEMIA: ICD-10-CM

## 2021-01-06 RX ORDER — LOVASTATIN 20 MG/1
TABLET ORAL
Qty: 90 TAB | Refills: 0 | Status: SHIPPED | OUTPATIENT
Start: 2021-01-06 | End: 2021-04-16

## 2021-01-20 ENCOUNTER — OFFICE VISIT (OUTPATIENT)
Dept: INTERNAL MEDICINE CLINIC | Age: 81
End: 2021-01-20
Payer: MEDICARE

## 2021-01-20 VITALS
DIASTOLIC BLOOD PRESSURE: 60 MMHG | TEMPERATURE: 98 F | SYSTOLIC BLOOD PRESSURE: 138 MMHG | OXYGEN SATURATION: 96 % | BODY MASS INDEX: 25.13 KG/M2 | HEART RATE: 55 BPM | HEIGHT: 71 IN | RESPIRATION RATE: 12 BRPM | WEIGHT: 179.5 LBS

## 2021-01-20 DIAGNOSIS — E78.00 HYPERCHOLESTEROLEMIA: ICD-10-CM

## 2021-01-20 DIAGNOSIS — Z86.79 ATRIAL FIBRILLATION, CURRENTLY IN SINUS RHYTHM: ICD-10-CM

## 2021-01-20 DIAGNOSIS — I10 ESSENTIAL HYPERTENSION: ICD-10-CM

## 2021-01-20 DIAGNOSIS — R73.02 GLUCOSE INTOLERANCE (IMPAIRED GLUCOSE TOLERANCE): Primary | ICD-10-CM

## 2021-01-20 DIAGNOSIS — M10.079 IDIOPATHIC GOUT OF FOOT, UNSPECIFIED CHRONICITY, UNSPECIFIED LATERALITY: ICD-10-CM

## 2021-01-20 DIAGNOSIS — E03.4 HYPOTHYROIDISM DUE TO ACQUIRED ATROPHY OF THYROID: ICD-10-CM

## 2021-01-20 LAB — HBA1C MFR BLD HPLC: 6.2 %

## 2021-01-20 PROCEDURE — 99214 OFFICE O/P EST MOD 30 MIN: CPT | Performed by: PHYSICIAN ASSISTANT

## 2021-01-20 PROCEDURE — 83036 HEMOGLOBIN GLYCOSYLATED A1C: CPT | Performed by: PHYSICIAN ASSISTANT

## 2021-01-20 RX ORDER — PETROLATUM,WHITE/LANOLIN
1000 OINTMENT (GRAM) TOPICAL DAILY
Status: ON HOLD | COMMUNITY
End: 2022-01-06

## 2021-01-20 RX ORDER — METOPROLOL TARTRATE 50 MG/1
25 TABLET ORAL DAILY
COMMUNITY
Start: 2020-12-03

## 2021-01-20 NOTE — PROGRESS NOTES
Mavis Garcia  Identified pt with two pt identifiers(name and ). Chief Complaint   Patient presents with    Hypertension    Cholesterol Problem    Blood sugar problem    Thyroid Problem       Reviewed record In preparation for visit and have obtained necessary documentation. Advanced directive / living will on file. 1. Have you been to the ER, urgent care clinic or hospitalized since your last visit? No     2. Have you seen or consulted any other health care providers outside of the [address] [place] [address] since your last visit? Include any pap smears or colon screening. No    Vitals reviewed with provider.     Health Maintenance reviewed:     Health Maintenance Due   Topic    COVID-19 Vaccine (1 of 2)    Shingrix Vaccine Age 49> (1 of 2)    Lipid Screen           Wt Readings from Last 3 Encounters:   21 179 lb 8 oz (81.4 kg)   19 181 lb 8 oz (82.3 kg)   18 183 lb (83 kg)        Temp Readings from Last 3 Encounters:   21 98 °F (36.7 °C) (Oral)   19 97.6 °F (36.4 °C) (Oral)   18 98 °F (36.7 °C) (Oral)        BP Readings from Last 3 Encounters:   21 (!) 153/74   19 135/70   18 142/58        Pulse Readings from Last 3 Encounters:   21 (!) 55   19 (!) 48   18 64        Vitals:    21 1306   BP: (!) 153/74   Pulse: (!) 55   Resp: 12   Temp: 98 °F (36.7 °C)   TempSrc: Oral   SpO2: 96%   Weight: 179 lb 8 oz (81.4 kg)   Height: 5' 11\" (1.803 m)   PainSc:   0 - No pain          Learning Assessment:   :       Learning Assessment 2014   PRIMARY LEARNER Patient   PRIMARY LANGUAGE ENGLISH   LEARNER PREFERENCE PRIMARY DEMONSTRATION   ANSWERED BY patient   RELATIONSHIP SELF        Depression Screening:   :       3 most recent PHQ Screens 2021   Little interest or pleasure in doing things Not at all   Feeling down, depressed, irritable, or hopeless Not at all   Total Score PHQ 2 0        Fall Risk Assessment:   :       Fall Risk Assessment, last 12 mths 4/7/2020   Able to walk? Yes   Fall in past 12 months? No        Abuse Screening:   :       Abuse Screening Questionnaire 4/7/2020 8/14/2019 2/2/2018 7/5/2016 11/13/2014   Do you ever feel afraid of your partner? N N N N N   Are you in a relationship with someone who physically or mentally threatens you? N N N N N   Is it safe for you to go home?  Y Y Y Y Y        ADL Screening:   :       ADL Assessment 11/13/2014   Feeding yourself No Help Needed   Getting from bed to chair No Help Needed   Getting dressed No Help Needed   Bathing or showering No Help Needed   Walk across the room (includes cane/walker) No Help Needed   Using the telphone No Help Needed   Taking your medications No Help Needed   Preparing meals No Help Needed   Managing money (expenses/bills) No Help Needed   Moderately strenuous housework (laundry) No Help Needed   Shopping for personal items (toiletries/medicines) No Help Needed   Shopping for groceries No Help Needed   Driving No Help Needed   Climbing a flight of stairs No Help Needed   Getting to places beyond walking distances No Help Needed

## 2021-01-20 NOTE — PROGRESS NOTES
Denton Mcneil is a [de-identified]y.o. year old male seen in clinic today for   Chief Complaint   Patient presents with    Hypertension    Cholesterol Problem    Blood sugar problem    Thyroid Problem       he is here today to establish care and follow up for HLD, HTN and prediabetes. He states that he follows a regular diet and walks almost daily, walking his dogs on his [place] acre property. He notes no trouble sleeping and no issues with depression or anxiety. 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 He was taken off after having issues and replaced with metoprolol. He has been in sinus rhythm and is followed by Dr. Anabeal Archibald once a year. He is due to see him soon. He notes no palpitations or dyspnea but notes he rarely gets mild chest pains when he is splitting wood for long periods of time. He is a non-smoker and does not drink alcohol. He has not had a gout flare in several years but when he did have them he had flare ups in the sole of his foot. he specifically denies any CP, SOB, HA. Dizziness, fevers, chills, N/V/D, urinary symptoms or other bowel changes. Current Outpatient Medications on File Prior to Visit   Medication Sig Dispense Refill    metoprolol tartrate (LOPRESSOR) 50 mg tablet TAKE 1 TABLET BY MOUTH TWICE DAILY      glucosamine sulfate 500 mg capsule Take 1,000 mg by mouth daily.  lovastatin (MEVACOR) 20 mg tablet Take 1 tablet by mouth nightly 90 Tab 0    benazepriL (LOTENSIN) 40 mg tablet TAKE 1 TABLET BY MOUTH ONCE DAILY .  APPOINTMENT REQUIRED FOR FUTURE REFILLS 90 Tab 0    hydroCHLOROthiazide (MICROZIDE) 12.5 mg capsule Take 1 capsule by mouth once daily 90 Cap 0    amLODIPine (NORVASC) 10 mg tablet TAKE 1 TABLET BY MOUTH ONCE DAILY (APPOINTMENT NEEDED FOR REFILL) 90 Tab 0    levothyroxine (SYNTHROID) 75 mcg tablet TAKE 1 TABLET BY MOUTH ONCE DAILY BEFORE BREAKFAST FOR THYROID 90 Tab 1    allopurinoL (ZYLOPRIM) 300 mg tablet Take 1 tablet by mouth once daily 90 Tab 3    aspirin delayed-release 81 mg tablet Take 1 Tab by mouth daily. 90 Tab 3    metoprolol succinate (TOPROL-XL) 50 mg XL tablet Take 50 mg by mouth two (2) times a day. No current facility-administered medications on file prior to visit.           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      Past Surgical History:   Procedure Laterality Date    HX CATARACT REMOVAL Right 2016    HX HEENT      nose polyps    HX ORTHOPAEDIC      L elbow surgery        Family History   Problem Relation Age of Onset    Hypertension Mother     Stroke Mother     Heart Disease Brother         late 61    Cancer Brother         Lung    Stroke Father     Cancer Sister         Lung        Social History     Socioeconomic History    Marital status:      Spouse name: Not on file    Number of children: Not on file    Years of education: Not on file    Highest education level: Not on file   Occupational History    Not on file   Social Needs    Financial resource strain: Not on file    Food insecurity     Worry: Not on file     Inability: Not on file    Transportation needs     Medical: Not on file     Non-medical: Not on file   Tobacco Use    Smoking status: Former Smoker     Packs/day: 1.00     Years: 16.00     Pack years: 16.00     Quit date: 3/12/1988     Years since quittin.8    Smokeless tobacco: Never Used   Substance and Sexual Activity    Alcohol use: No    Drug use: Yes     Types: Prescription, Marijuana     Comment: \"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\"    Sexual activity: Yes     Partners: Female   Lifestyle    Physical activity     Days per week: Not on file     Minutes per session: Not on file    Stress: Not on file   Relationships    Social connections     Talks on phone: Not on file     Gets together: Not on file     Attends Rastafari service: Not on file     Active member of club or organization: Not on file     Attends meetings of clubs or organizations: Not on file     Relationship status: Not on file    Intimate partner violence     Fear of current or ex partner: Not on file     Emotionally abused: Not on file     Physically abused: Not on file     Forced sexual activity: Not on file   Other Topics Concern    Not on file   Social History Narrative    Not on file           Visit Vitals  /60 (BP 1 Location: Right arm, BP Patient Position: Sitting)   Pulse (!) 55   Temp 98 °F (36.7 °C) (Oral)   Resp 12   Ht 5' 11\" (1.803 m)   Wt 179 lb 8 oz (81.4 kg)   SpO2 96%   BMI 25.04 kg/m²       Review of Systems   Constitutional: Negative for chills, fever, malaise/fatigue and weight loss. Respiratory: Negative for cough, shortness of breath and wheezing. Cardiovascular: Negative for chest pain, palpitations and leg swelling. Gastrointestinal: Negative for abdominal pain, blood in stool, constipation, diarrhea, heartburn, melena, nausea and vomiting. Genitourinary: Negative for dysuria and frequency. Musculoskeletal: Negative for myalgias. Skin: Negative for rash. Neurological: Negative for dizziness, weakness and headaches. Endo/Heme/Allergies: Does not bruise/bleed easily. Psychiatric/Behavioral: Negative for depression. All other systems reviewed and are negative. Physical Exam  Vitals signs and nursing note reviewed. Constitutional:       Appearance: Normal appearance. HENT:      Head: Normocephalic and atraumatic.       Right Ear: Tympanic membrane, ear canal and external ear normal.      Left Ear: Tympanic membrane, ear canal and external ear normal.      Nose: Nose normal. Mouth/Throat:      Mouth: Mucous membranes are moist.      Pharynx: Oropharynx is clear. No oropharyngeal exudate or posterior oropharyngeal erythema. Eyes:      Conjunctiva/sclera: Conjunctivae normal.      Pupils: Pupils are equal, round, and reactive to light. Neck:      Musculoskeletal: Normal range of motion and neck supple. No neck rigidity. Vascular: No carotid bruit. Cardiovascular:      Rate and Rhythm: Normal rate and regular rhythm. Pulses: Normal pulses. Heart sounds: Normal heart sounds. No murmur. Pulmonary:      Effort: Pulmonary effort is normal.      Breath sounds: Normal breath sounds. No stridor. No wheezing, rhonchi or rales. Abdominal:      General: Abdomen is flat. Bowel sounds are normal. There is no distension. Tenderness: There is no abdominal tenderness. There is no guarding. Musculoskeletal: Normal range of motion. Skin:     General: Skin is dry. Capillary Refill: Capillary refill takes less than 2 seconds. Neurological:      General: No focal deficit present. Mental Status: He is oriented to person, place, and time. Mental status is at baseline. Psychiatric:         Mood and Affect: Mood normal.          Recent Results (from the past 24 hour(s))   AMB POC HEMOGLOBIN A1C    Collection Time: 01/20/21  1:10 PM   Result Value Ref Range    Hemoglobin A1c (POC) 6.2 %        ASSESSMENT AND PLAN  Diagnoses and all orders for this visit:    1. Glucose intolerance (impaired glucose tolerance)  -     AMB POC HEMOGLOBIN A1C  A1C unchanged. Continue active lifestyle. No meds for sugars at this point. Low carb diet encouraged. 2. Essential hypertension  Stable, continue current treatment pending review of labs. 3. Hypothyroidism due to acquired atrophy of thyroid  -     TSH 3RD GENERATION; Future  Stable, continue current treatment pending review of labs.      4. Atrial fibrillation, currently in sinus rhythm  RRR, Followed by Dr. Celestina Kirk, see's shortly. Advised to tell Dr. Marcelo Blood about chest pains when splitting wood. 5. Hypercholesterolemia  -     LIPID PANEL; Future  -     METABOLIC PANEL, COMPREHENSIVE; Future  Stable, continue current treatment pending review of labs. 6. Idiopathic gout of foot, unspecified chronicity, unspecified laterality  Stable, continue current treatment pending review of labs. I have discussed the diagnosis with the patient and the intended plan as seen in the above orders. Patient is in agreement. The patient has received an after-visit summary and questions were answered concerning future plans. I have discussed medication side effects and warnings with the patient as well.     Marla Herrera PA-C

## 2021-01-20 NOTE — PATIENT INSTRUCTIONS
Recombinant Zoster (Shingles) Vaccine: What You Need to Know Why get vaccinated? Recombinant zoster (shingles) vaccine can prevent shingles. Shingles (also called herpes zoster, or just zoster) is a painful skin rash, usually with blisters. In addition to the rash, shingles can cause fever, headache, chills, or upset stomach. More rarely, shingles can lead to pneumonia, hearing problems, blindness, brain inflammation (encephalitis), or death. The most common complication of shingles is long-term nerve pain called postherpetic neuralgia (PHN). PHN occurs in the areas where the shingles rash was, even after the rash clears up. It can last for months or years after the rash goes away. The pain from PHN can be severe and debilitating. About 10 to 18% of people who get shingles will experience PHN. The risk of PHN increases with age. An older adult with shingles is more likely to develop PHN and have longer lasting and more severe pain than a younger person with shingles. Shingles is caused by the varicella zoster virus, the same virus that causes chickenpox. After you have chickenpox, the virus stays in your body and can cause shingles later in life. Shingles cannot be passed from one person to another, but the virus that causes shingles can spread and cause chickenpox in someone who had never had chickenpox or received chickenpox vaccine. Recombinant shingles vaccine Recombinant shingles vaccine provides strong protection against shingles. By preventing shingles, recombinant shingles vaccine also protects against PHN. Recombinant shingles vaccine is the preferred vaccine for the prevention of shingles. However, a different vaccine, live shingles vaccine, may be used in some circumstances. The recombinant shingles vaccine is recommended for adults 50 years and older without serious immune problems. It is given as a two-dose series. This vaccine is also recommended for people who have already gotten another type of shingles vaccine, the live shingles vaccine. There is no live virus in this vaccine. Shingles vaccine may be given at the same time as other vaccines. Talk with your health care provider Tell your vaccine provider if the person getting the vaccine: 
· Has had an allergic reaction after a previous dose of recombinant shingles vaccine, or has any severe, life-threatening allergies. · Is pregnant or breastfeeding. · Is currently experiencing an episode of shingles. In some cases, your health care provider may decide to postpone shingles vaccination to a future visit. People with minor illnesses, such as a cold, may be vaccinated. People who are moderately or severely ill should usually wait until they recover before getting recombinant shingles vaccine. Your health care provider can give you more information. Risks of a vaccine reaction · A sore arm with mild or moderate pain is very common after recombinant shingles vaccine, affecting about 80% of vaccinated people. Redness and swelling can also happen at the site of the injection. · Tiredness, muscle pain, headache, shivering, fever, stomach pain, and nausea happen after vaccination in more than half of people who receive recombinant shingles vaccine. In clinical trials, about 1 out of 6 people who got recombinant zoster vaccine experienced side effects that prevented them from doing regular activities. Symptoms usually went away on their own in 2 to 3 days. You should still get the second dose of recombinant zoster vaccine even if you had one of these reactions after the first dose. People sometimes faint after medical procedures, including vaccination. Tell your provider if you feel dizzy or have vision changes or ringing in the ears. As with any medicine, there is a very remote chance of a vaccine causing a severe allergic reaction, other serious injury, or death. What if there is a serious problem? An allergic reaction could occur after the vaccinated person leaves the clinic. If you see signs of a severe allergic reaction (hives, swelling of the face and throat, difficulty breathing, a fast heartbeat, dizziness, or weakness), call 9-1-1 and get the person to the nearest hospital. 
For other signs that concern you, call your health care provider. Adverse reactions should be reported to the Vaccine Adverse Event Reporting System (VAERS). Your health care provider will usually file this report, or you can do it yourself. Visit the VAERS website at www.vaers. [facility].gov or call 0-872.706.6770. VAERS is only for reporting reactions, and VAERS staff do not give medical advice. How can I learn more? · Ask your health care provider. · Call your local or state health department. · Contact the Centers for Disease Control and Prevention (CDC): 
? Call 4-716.429.3120 (1-800-CDC-INFO) or 
? Visit CDC's website at www.cdc.gov/vaccines Vaccine Information Statement Recombinant Zoster Vaccine 10/30/2019 Department of [facility] and [place] Centers for Disease Control and Prevention Many Vaccine Information Statements are available in [place] and other languages. See www.immunize.org/vis. Hojas de Información Sobre Vacunas están disponibles en Español y en muchos otros idiomas. [employer]ite [employer] Care instructions adapted under license by [employer] (which disclaims liability or warranty for this information). If you have questions about a medical condition or this instruction, always ask your healthcare professional. [place] [address] any warranty or liability for your use of this information.

## 2021-01-21 NOTE — PROGRESS NOTES
Lakeshia Zarate, your A1C came back at 6.2. No changes to any medications right now! Keep up the good work and I'll see you in six months.  Will allert you when your other labs are back

## 2021-01-28 DIAGNOSIS — N18.32 STAGE 3B CHRONIC KIDNEY DISEASE (HCC): Primary | ICD-10-CM

## 2021-01-28 LAB
ALBUMIN SERPL-MCNC: 4 G/DL (ref 3.7–4.7)
ALBUMIN/GLOB SERPL: 1.1 {RATIO} (ref 1.2–2.2)
ALP SERPL-CCNC: 83 IU/L (ref 39–117)
ALT SERPL-CCNC: 10 IU/L (ref 0–44)
AST SERPL-CCNC: 14 IU/L (ref 0–40)
BILIRUB SERPL-MCNC: 0.3 MG/DL (ref 0–1.2)
BUN SERPL-MCNC: 25 MG/DL (ref 8–27)
BUN/CREAT SERPL: 16 (ref 10–24)
CALCIUM SERPL-MCNC: 9.7 MG/DL (ref 8.6–10.2)
CHLORIDE SERPL-SCNC: 103 MMOL/L (ref 96–106)
CHOLEST SERPL-MCNC: 151 MG/DL (ref 100–199)
CO2 SERPL-SCNC: 22 MMOL/L (ref 20–29)
CREAT SERPL-MCNC: 1.56 MG/DL (ref 0.76–1.27)
GLOBULIN SER CALC-MCNC: 3.6 G/DL (ref 1.5–4.5)
GLUCOSE SERPL-MCNC: 108 MG/DL (ref 65–99)
HDLC SERPL-MCNC: 35 MG/DL
LDLC SERPL CALC-MCNC: 90 MG/DL (ref 0–99)
POTASSIUM SERPL-SCNC: 4.8 MMOL/L (ref 3.5–5.2)
PROT SERPL-MCNC: 7.6 G/DL (ref 6–8.5)
SODIUM SERPL-SCNC: 138 MMOL/L (ref 134–144)
TRIGL SERPL-MCNC: 150 MG/DL (ref 0–149)
TSH SERPL DL<=0.005 MIU/L-ACNC: 1.1 UIU/ML (ref 0.45–4.5)
VLDLC SERPL CALC-MCNC: 26 MG/DL (ref 5–40)

## 2021-01-28 NOTE — PROGRESS NOTES
Can you please mail a Nephrology referral with Dr. Jamal Quezada to Mr. Randall Men home please     Let me know what I need to do/put in orders.

## 2021-02-15 RX ORDER — AMLODIPINE BESYLATE 10 MG/1
TABLET ORAL
Qty: 90 TAB | Refills: 0 | Status: SHIPPED | OUTPATIENT
Start: 2021-02-15 | End: 2021-05-17

## 2021-03-24 ENCOUNTER — TRANSCRIBE ORDER (OUTPATIENT)
Dept: SCHEDULING | Age: 81
End: 2021-03-24

## 2021-03-24 DIAGNOSIS — N18.32 CHRONIC KIDNEY DISEASE (CKD) STAGE G3B/A1, MODERATELY DECREASED GLOMERULAR FILTRATION RATE (GFR) BETWEEN 30-44 ML/MIN/1.73 SQUARE METER AND ALBUMINURIA CREATININE RATIO LESS THAN 30 MG/G (HCC): Primary | ICD-10-CM

## 2021-04-05 RX ORDER — BENAZEPRIL HYDROCHLORIDE 40 MG/1
TABLET ORAL
Qty: 90 TAB | Refills: 0 | Status: SHIPPED | OUTPATIENT
Start: 2021-04-05 | End: 2021-06-28

## 2021-04-05 RX ORDER — HYDROCHLOROTHIAZIDE 12.5 MG/1
CAPSULE ORAL
Qty: 90 CAP | Refills: 0 | Status: SHIPPED | OUTPATIENT
Start: 2021-04-05 | End: 2021-06-28

## 2021-04-16 DIAGNOSIS — E78.00 HYPERCHOLESTEROLEMIA: ICD-10-CM

## 2021-04-16 RX ORDER — LOVASTATIN 20 MG/1
TABLET ORAL
Qty: 90 TAB | Refills: 0 | Status: SHIPPED | OUTPATIENT
Start: 2021-04-16 | End: 2021-07-19

## 2021-04-26 RX ORDER — ALLOPURINOL 300 MG/1
TABLET ORAL
Qty: 90 TAB | Refills: 0 | Status: SHIPPED | OUTPATIENT
Start: 2021-04-26 | End: 2021-07-19

## 2021-04-26 RX ORDER — LEVOTHYROXINE SODIUM 75 UG/1
TABLET ORAL
Qty: 90 TAB | Refills: 0 | Status: SHIPPED | OUTPATIENT
Start: 2021-04-26 | End: 2021-07-26

## 2021-06-28 RX ORDER — HYDROCHLOROTHIAZIDE 12.5 MG/1
CAPSULE ORAL
Qty: 90 CAPSULE | Refills: 0 | Status: SHIPPED | OUTPATIENT
Start: 2021-06-28 | End: 2021-10-10

## 2021-06-28 RX ORDER — BENAZEPRIL HYDROCHLORIDE 40 MG/1
TABLET ORAL
Qty: 90 TABLET | Refills: 0 | Status: SHIPPED | OUTPATIENT
Start: 2021-06-28 | End: 2021-09-26

## 2021-07-19 DIAGNOSIS — E78.00 HYPERCHOLESTEROLEMIA: ICD-10-CM

## 2021-07-19 RX ORDER — LOVASTATIN 20 MG/1
TABLET ORAL
Qty: 90 TABLET | Refills: 0 | Status: SHIPPED | OUTPATIENT
Start: 2021-07-19 | End: 2021-10-28

## 2021-07-19 RX ORDER — ALLOPURINOL 300 MG/1
TABLET ORAL
Qty: 90 TABLET | Refills: 0 | Status: SHIPPED | OUTPATIENT
Start: 2021-07-19 | End: 2021-10-18

## 2021-07-26 RX ORDER — LEVOTHYROXINE SODIUM 75 UG/1
TABLET ORAL
Qty: 90 TABLET | Refills: 0 | Status: SHIPPED | OUTPATIENT
Start: 2021-07-26 | End: 2021-10-25

## 2021-08-03 ENCOUNTER — OFFICE VISIT (OUTPATIENT)
Dept: INTERNAL MEDICINE CLINIC | Age: 81
End: 2021-08-03
Payer: MEDICARE

## 2021-08-03 VITALS
HEART RATE: 55 BPM | SYSTOLIC BLOOD PRESSURE: 138 MMHG | DIASTOLIC BLOOD PRESSURE: 60 MMHG | HEIGHT: 71 IN | RESPIRATION RATE: 12 BRPM | TEMPERATURE: 98.3 F | WEIGHT: 182 LBS | BODY MASS INDEX: 25.48 KG/M2 | OXYGEN SATURATION: 96 %

## 2021-08-03 DIAGNOSIS — R73.02 GLUCOSE INTOLERANCE (IMPAIRED GLUCOSE TOLERANCE): ICD-10-CM

## 2021-08-03 DIAGNOSIS — I10 ESSENTIAL HYPERTENSION: ICD-10-CM

## 2021-08-03 DIAGNOSIS — M10.079 IDIOPATHIC GOUT OF FOOT, UNSPECIFIED CHRONICITY, UNSPECIFIED LATERALITY: ICD-10-CM

## 2021-08-03 DIAGNOSIS — E78.00 HYPERCHOLESTEROLEMIA: ICD-10-CM

## 2021-08-03 DIAGNOSIS — N18.32 STAGE 3B CHRONIC KIDNEY DISEASE (HCC): ICD-10-CM

## 2021-08-03 DIAGNOSIS — Z00.00 MEDICARE ANNUAL WELLNESS VISIT, SUBSEQUENT: Primary | ICD-10-CM

## 2021-08-03 DIAGNOSIS — E03.4 HYPOTHYROIDISM DUE TO ACQUIRED ATROPHY OF THYROID: ICD-10-CM

## 2021-08-03 PROCEDURE — G8432 DEP SCR NOT DOC, RNG: HCPCS | Performed by: PHYSICIAN ASSISTANT

## 2021-08-03 PROCEDURE — G8536 NO DOC ELDER MAL SCRN: HCPCS | Performed by: PHYSICIAN ASSISTANT

## 2021-08-03 PROCEDURE — G8427 DOCREV CUR MEDS BY ELIG CLIN: HCPCS | Performed by: PHYSICIAN ASSISTANT

## 2021-08-03 PROCEDURE — 99214 OFFICE O/P EST MOD 30 MIN: CPT | Performed by: PHYSICIAN ASSISTANT

## 2021-08-03 PROCEDURE — G8754 DIAS BP LESS 90: HCPCS | Performed by: PHYSICIAN ASSISTANT

## 2021-08-03 PROCEDURE — G8419 CALC BMI OUT NRM PARAM NOF/U: HCPCS | Performed by: PHYSICIAN ASSISTANT

## 2021-08-03 PROCEDURE — G0439 PPPS, SUBSEQ VISIT: HCPCS | Performed by: PHYSICIAN ASSISTANT

## 2021-08-03 PROCEDURE — G8752 SYS BP LESS 140: HCPCS | Performed by: PHYSICIAN ASSISTANT

## 2021-08-03 PROCEDURE — 1101F PT FALLS ASSESS-DOCD LE1/YR: CPT | Performed by: PHYSICIAN ASSISTANT

## 2021-08-03 RX ORDER — CHOLECALCIFEROL (VITAMIN D3) 50 MCG
CAPSULE ORAL
Status: ON HOLD | COMMUNITY
End: 2022-01-06

## 2021-08-03 NOTE — LETTER
8/10/2021 7:38 AM    Mr. Loyda [address] [address]    Results for orders placed or performed in visit on 30/83/42   METABOLIC PANEL, COMPREHENSIVE   Result Value Ref Range    Glucose 108 (H) 65 - 99 mg/dL    BUN 20 8 - 27 mg/dL    Creatinine 1.38 (H) 0.76 - 1.27 mg/dL    GFR est non-AA 48 (L) >59 mL/min/1.73    GFR est AA 55 (L) >59 mL/min/1.73    BUN/Creatinine ratio 14 10 - 24    Sodium 138 134 - 144 mmol/L    Potassium 4.4 3.5 - 5.2 mmol/L    Chloride 102 96 - 106 mmol/L    CO2 22 20 - 29 mmol/L    Calcium 9.7 8.6 - 10.2 mg/dL    Protein, total 7.9 6.0 - 8.5 g/dL    Albumin 4.1 3.7 - 4.7 g/dL    GLOBULIN, TOTAL 3.8 1.5 - 4.5 g/dL    A-G Ratio 1.1 (L) 1.2 - 2.2    Bilirubin, total 0.3 0.0 - 1.2 mg/dL    Alk. phosphatase 78 48 - 121 IU/L    AST (SGOT) 16 0 - 40 IU/L    ALT (SGPT) 12 0 - 44 IU/L   HEMOGLOBIN A1C WITH EAG   Result Value Ref Range    Hemoglobin A1c 6.1 (H) 4.8 - 5.6 %    Estimated average glucose 128 mg/dL             RECOMMENDATIONS  Samantha,  A1C has improved by 0.1, kidney function looks to have improved as well.   See you in 6 months          Sincerely,      Angeles Elam PA-C

## 2021-08-03 NOTE — PROGRESS NOTES
This is the Subsequent Medicare Annual Wellness Exam, performed 12 months or more after the Initial AWV or the last Subsequent AWV    I have reviewed the patient's medical history in detail and updated the computerized patient record. Assessment/Plan   Education and counseling provided:  Are appropriate based on today's review and evaluation  End-of-Life planning (with patient's consent)    1. Essential hypertension  2. Glucose intolerance (impaired glucose tolerance)  3. Hypothyroidism due to acquired atrophy of thyroid  4. Stage 3b chronic kidney disease ([facility] [address]  5. Hypercholesterolemia  6. Idiopathic gout of foot, unspecified chronicity, unspecified laterality  7. Medicare annual wellness visit, subsequent       Depression Risk Factor Screening     3 most recent PHQ Screens 1/20/2021   Little interest or pleasure in doing things Not at all   Feeling down, depressed, irritable, or hopeless Not at all   Total Score PHQ 2 0       Alcohol Risk Screen    Do you average more than 1 drink per night or more than 7 drinks a week: No    In the past three months have you have had more than 4 drinks containing alcohol on one occasion: No        Functional Ability and Level of Safety    Hearing: Hearing is good. The patient wears hearing aids. Activities of Daily Living: The home contains: handrails  Patient does total self care      Ambulation: with no difficulty     Fall Risk:  Fall Risk Assessment, last 12 mths 4/7/2020   Able to walk? Yes   Fall in past 12 months?  No      Abuse Screen:  Patient is not abused       Cognitive Screening    Has your family/caregiver stated any concerns about your memory: no     Cognitive Screening: Normal - Mini Cog Test    Health Maintenance Due     Health Maintenance Due   Topic Date Due    COVID-19 Vaccine (1) Never done    Shingrix Vaccine Age 50> (1 of 2) Never done    Medicare Yearly Exam  04/08/2021       Patient Care Team   Patient Care Team:  Mikey Pink PA-C as PCP - General (Physician Assistant)  Eleanor Mcwilliams PA-C as PCP - [facility] Empaneled Provider  Lacey Morales MD (Cardiology)  Dg Vann MD as Consulting Provider (Endocrinology)  Dg Vann MD as Consulting Provider (Endocrinology)    History     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    Advance directive discussed with patient Z70.80    Combined forms of 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 eye H25.811    Combined forms of age-related cataract of left eye H25.812    Stage 3b chronic kidney disease ([address] N18.32     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      Past Surgical History:   Procedure Laterality Date    HX CATARACT REMOVAL Right 08/31/2016    HX HEENT      nose polyps    HX ORTHOPAEDIC      L elbow surgery     Current Outpatient Medications   Medication Sig Dispense Refill    levothyroxine (SYNTHROID) 75 mcg tablet TAKE 1 TABLET BY MOUTH ONCE DAILY BEFORE BREAKFAST FOR THYROID 90 Tablet 0    allopurinoL (ZYLOPRIM) 300 mg tablet Take 1 tablet by mouth once daily 90 Tablet 0    lovastatin (MEVACOR) 20 mg tablet Take 1 tablet by mouth nightly 90 Tablet 0    hydroCHLOROthiazide (MICROZIDE) 12.5 mg capsule Take 1 capsule by mouth once daily 90 Capsule 0    benazepriL (LOTENSIN) 40 mg tablet TAKE 1 TABLET BY MOUTH ONCE DAILY .  APPOINTMENT REQUIRED FOR FUTURE REFILLS 90 Tablet 0    amLODIPine (NORVASC) 10 mg tablet Take 1 tablet by mouth once daily 90 Tab 2    metoprolol tartrate (LOPRESSOR) 50 mg tablet TAKE 1 TABLET BY MOUTH TWICE DAILY      glucosamine sulfate 500 mg capsule Take 1,000 mg by mouth daily.  aspirin delayed-release 81 mg tablet Take 1 Tab by mouth daily. 90 Tab 3    metoprolol succinate (TOPROL-XL) 50 mg XL tablet Take 50 mg by mouth two (2) times a day. Allergies   Allergen Reactions    Bee Sting [Sting, Bee] Anaphylaxis    Pcn [Penicillins] Swelling       Family History   Problem Relation Age of Onset    Hypertension Mother     Stroke Mother     Heart Disease Brother         late 61    Cancer Brother         Lung    Stroke Father     Cancer Sister         Lung     Social History     Tobacco Use    Smoking status: Former Smoker     Packs/day: 1.00     Years: 16.00     Pack years: 16.00     Quit date: 3/12/1988     Years since quittin.4    Smokeless tobacco: Never Used   Substance Use Topics    Alcohol use: No         Gemma Meter, LOLLY     Cash Hood is a [de-identified]y.o. year old male seen in clinic today for   Chief Complaint   Patient presents with    Annual Wellness Visit    Hypertension    Blood sugar problem       he is here today to follow up for A-FIB, CKD3, HTN, HLD, IFG    Hypertension: Controlled with hctz, benazepril, amlodipine, metoprolol   Compliant with medications? alway  Compliant with diet? Low salt, low fat  Compliant with exercise? Walks dog daily  Average blood pressure readings outside of office. 824 systolic    Denies any HA, dizziness, CP, SOB, edema, visual changes    Pt has a hx of Hypothyroidism. 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 They are always compliant with their daily medications. Pt denies any temperature imbalances, hair related symptoms, bowel changes or abnormal weight gain/loss. Lab Results   Component Value Date/Time    TSH 1.100 2021 11:25 AM     Found to have worsened CKD last blood draw.  No urinary issues. Did not see Nephrology, deemed too early. Continues to use ASA daily. Declines PSA testing. Not getting COVID vaccine. he specifically denies any CP, SOB, HA. Dizziness, fevers, chills, N/V/D, urinary symptoms or other bowel changes. Current Outpatient Medications on File Prior to Visit   Medication Sig Dispense Refill    omega 3-dha-epa-fish oil (Fish OiL) 100-160-1,000 mg cap Fish Oil      levothyroxine (SYNTHROID) 75 mcg tablet TAKE 1 TABLET BY MOUTH ONCE DAILY BEFORE BREAKFAST FOR THYROID 90 Tablet 0    allopurinoL (ZYLOPRIM) 300 mg tablet Take 1 tablet by mouth once daily 90 Tablet 0    lovastatin (MEVACOR) 20 mg tablet Take 1 tablet by mouth nightly 90 Tablet 0    hydroCHLOROthiazide (MICROZIDE) 12.5 mg capsule Take 1 capsule by mouth once daily 90 Capsule 0    benazepriL (LOTENSIN) 40 mg tablet TAKE 1 TABLET BY MOUTH ONCE DAILY . APPOINTMENT REQUIRED FOR FUTURE REFILLS 90 Tablet 0    amLODIPine (NORVASC) 10 mg tablet Take 1 tablet by mouth once daily 90 Tab 2    metoprolol tartrate (LOPRESSOR) 50 mg tablet TAKE 1 TABLET BY MOUTH TWICE DAILY      glucosamine sulfate 500 mg capsule Take 1,000 mg by mouth daily.  aspirin delayed-release 81 mg tablet Take 1 Tab by mouth daily. 90 Tab 3    [DISCONTINUED] metoprolol succinate (TOPROL-XL) 50 mg XL tablet Take 50 mg by mouth two (2) times a day. No current facility-administered medications on file prior to visit.          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      Past Surgical History:   Procedure Laterality Date    HX CATARACT REMOVAL Right 08/31/2016  HX HEENT      nose polyps    HX ORTHOPAEDIC      L elbow surgery        Family History   Problem Relation Age of Onset    Hypertension Mother     Stroke Mother     Heart Disease Brother         late 61    Cancer Brother         Lung    Stroke Father     Cancer Sister         Lung        Social History     Socioeconomic History    Marital status:      Spouse name: Not on file    Number of children: Not on file    Years of education: Not on file    Highest education level: Not on file   Occupational History    Not on file   Tobacco Use    Smoking status: Former Smoker     Packs/day: 1.00     Years: 16.00     Pack years: 16.00     Quit date: 3/12/1988     Years since quittin.4    Smokeless tobacco: Never Used   Vaping Use    Vaping Use: Never used   Substance and Sexual Activity    Alcohol use: No    Drug use: Not Currently     Types: Prescription, Marijuana     Comment: \"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\"    Sexual activity: Yes     Partners: Female   Other Topics Concern    Not on file   Social History Narrative    Not on file     Social Determinants of Health     Financial Resource Strain:     Difficulty of Paying Living Expenses:    Food Insecurity:     Worried About Running Out of Food in the Last Year:     Ran Out of Food in the Last Year:    Transportation Needs:     Lack of Transportation (Medical):      Lack of Transportation (Non-Medical):    Physical Activity:     Days of Exercise per Week:     Minutes of Exercise per Session:    Stress:     Feeling of Stress :    Social Connections:     Frequency of Communication with Friends and Family:     Frequency of Social Gatherings with Friends and Family:     Attends [employer] Services:     Active Member of Clubs or Organizations:     Attends Club or Organization Meetings:     Marital Status:    Intimate Partner Violence:     Fear of Current or Ex-Partner:     Emotionally Abused:     Physically Abused:     Sexually Abused: Visit Vitals  /60 (BP 1 Location: Right arm, BP Patient Position: Sitting, BP Cuff Size: Adult)   Pulse (!) 43   Temp 98.3 °F (36.8 °C) (Oral)   Resp 12   Ht 5' 11\" (1.803 m)   Wt 182 lb (82.6 kg)   SpO2 96%   BMI 25.38 kg/m²       Review of Systems   Constitutional: Negative for chills, fever, malaise/fatigue and weight loss. Respiratory: Negative for cough, shortness of breath and wheezing. Cardiovascular: Negative for chest pain, palpitations and leg swelling. Gastrointestinal: Negative for abdominal pain, blood in stool, constipation, diarrhea, heartburn, melena, nausea and vomiting. Genitourinary: Negative for dysuria and frequency. Musculoskeletal: Negative for myalgias. Skin: Negative for rash. Neurological: Negative for dizziness, weakness and headaches. Endo/Heme/Allergies: Does not bruise/bleed easily. Psychiatric/Behavioral: Negative for depression. All other systems reviewed and are negative. Physical Exam  Vitals and nursing note reviewed. Constitutional:       Appearance: Normal appearance. He is normal weight. HENT:      Head: Normocephalic and atraumatic. Right Ear: Tympanic membrane, ear canal and external ear normal.      Left Ear: Tympanic membrane, ear canal and external ear normal.      Nose: Nose normal.      Mouth/Throat:      Mouth: Mucous membranes are moist.      Pharynx: Oropharynx is clear. No oropharyngeal exudate or posterior oropharyngeal erythema. Eyes:      Conjunctiva/sclera: Conjunctivae normal.      Pupils: Pupils are equal, round, and reactive to light. Neck:      Vascular: No carotid bruit. Cardiovascular:      Rate and Rhythm: Normal rate and regular rhythm. Pulses: Normal pulses. Heart sounds: Normal heart sounds. No murmur heard. Pulmonary:      Effort: Pulmonary effort is normal.      Breath sounds: Normal breath sounds. No stridor. No wheezing, rhonchi or rales. Abdominal:      General: Abdomen is flat.  Bowel sounds are normal. There is no distension. Tenderness: There is no abdominal tenderness. There is no guarding. Musculoskeletal:         General: Normal range of motion. Cervical back: Normal range of motion and neck supple. No rigidity. Right lower leg: No edema. Left lower leg: No edema. Skin:     General: Skin is dry. Capillary Refill: Capillary refill takes less than 2 seconds. Comments: Tattoos throughout arms, torso   Neurological:      General: No focal deficit present. Mental Status: He is alert and oriented to person, place, and time. Mental status is at baseline. Psychiatric:         Mood and Affect: Mood normal.          No results found for this or any previous visit (from the past 24 hour(s)). ASSESSMENT AND PLAN  Diagnoses and all orders for this visit:    1. Medicare annual wellness visit, subsequent    2. Essential hypertension  HTN at goal, upper level of normal   3. Glucose intolerance (impaired glucose tolerance)  -     HEMOGLOBIN A1C WITH EAG; Future  A1C 6.2, recheck  4. Hypothyroidism due to acquired atrophy of thyroid  TSH at goal, continue 75 mcg  5. Stage 3b chronic kidney disease (HCC)  -     METABOLIC PANEL, COMPREHENSIVE; Future  GFR 44 last check, recheck, avoid NSAIDS  6. Hypercholesterolemia  LDL at goal last check, continue Mevacor  7. Idiopathic gout of foot, unspecified chronicity, unspecified laterality  Continue allopurinol         I have discussed the diagnosis with the patient and the intended plan as seen in the above orders. Patient is in agreement. The patient has received an after-visit summary and questions were answered concerning future plans. I have discussed medication side effects and warnings with the patient as well.     Tana Grimes PA-C

## 2021-08-03 NOTE — PROGRESS NOTES
Feroz Sullivan  Identified pt with two pt identifiers(name and ). Chief Complaint   Patient presents with    Annual Wellness Visit    Hypertension    Blood sugar problem       Reviewed record In preparation for visit and have obtained necessary documentation. Advanced directive / living will on file. 1. Have you been to the ER, urgent care clinic or hospitalized since your last visit? No     2. Have you seen or consulted any other health care providers outside of the [address] [place] [address] since your last visit? Include any pap smears or colon screening. No    Vitals reviewed with provider. Health Maintenance reviewed:     Health Maintenance Due   Topic    COVID-19 Vaccine (1)    Shingrix Vaccine Age 49> (1 of 2)    Medicare Yearly Exam         Wt Readings from Last 3 Encounters:   21 179 lb 8 oz (81.4 kg)   19 181 lb 8 oz (82.3 kg)   18 183 lb (83 kg)      Temp Readings from Last 3 Encounters:   21 98 °F (36.7 °C) (Oral)   19 97.6 °F (36.4 °C) (Oral)   18 98 °F (36.7 °C) (Oral)      BP Readings from Last 3 Encounters:   21 138/60   19 135/70   18 142/58      Pulse Readings from Last 3 Encounters:   21 (!) 55   19 (!) 48   18 64      There were no vitals filed for this visit. Learning Assessment:   :     Learning Assessment 2014   PRIMARY LEARNER Patient   PRIMARY LANGUAGE ENGLISH   LEARNER PREFERENCE PRIMARY DEMONSTRATION   ANSWERED BY patient   RELATIONSHIP SELF        Depression Screening:   :     3 most recent PHQ Screens 2021   Little interest or pleasure in doing things Not at all   Feeling down, depressed, irritable, or hopeless Not at all   Total Score PHQ 2 0        Fall Risk Assessment:   :     Fall Risk Assessment, last 12 mths 2020   Able to walk? Yes   Fall in past 12 months?  No        Abuse Screening:   :     Abuse Screening Questionnaire 2014   Do you ever feel afraid of your partner? N N N N N   Are you in a relationship with someone who physically or mentally threatens you? N N N N N   Is it safe for you to go home?  Y Y Y Y Y        ADL Screening:   :     ADL Assessment 8/3/2021   Feeding yourself No Help Needed   Getting from bed to chair No Help Needed   Getting dressed No Help Needed   Bathing or showering No Help Needed   Walk across the room (includes cane/walker) No Help Needed   Using the telphone No Help Needed   Taking your medications No Help Needed   Preparing meals No Help Needed   Managing money (expenses/bills) No Help Needed   Moderately strenuous housework (laundry) No Help Needed   Shopping for personal items (toiletries/medicines) No Help Needed   Shopping for groceries No Help Needed   Driving No Help Needed   Climbing a flight of stairs No Help Needed   Getting to places beyond walking distances No Help Needed

## 2021-08-03 NOTE — PATIENT INSTRUCTIONS
Medicare Wellness Visit, Male    The best way to live healthy is to have a lifestyle where you eat a well-balanced diet, exercise regularly, limit alcohol use, and quit all forms of tobacco/nicotine, if applicable. Regular preventive services are another way to keep healthy. Preventive services (vaccines, screening tests, monitoring & exams) can help personalize your care plan, which helps you manage your own care. Screening tests can find health problems at the earliest stages, when they are easiest to treat. [facility] follows the current, evidence-based guidelines published by the [facility] Renard Oleg ([facility]) when recommending preventive services for our patients. Because we follow these guidelines, sometimes recommendations change over time as research supports it. (For example, a prostate screening blood test is no longer routinely recommended for men with no symptoms). Of course, you and your doctor may decide to screen more often for some diseases, based on your risk and co-morbidities (chronic disease you are already diagnosed with). Preventive services for you include:  - Medicare offers their members a free annual wellness visit, which is time for you and your primary care provider to discuss and plan for your preventive service needs. Take advantage of this benefit every year!  -All adults over age 72 should receive the recommended pneumonia vaccines. Current USPSTF guidelines recommend a series of two vaccines for the best pneumonia protection.   -All adults should have a flu vaccine yearly and tetanus vaccine every 10 years.  -All adults age 48 and older should receive the shingles vaccines (series of two vaccines).        -All adults age 38-68 who are overweight should have a diabetes screening test once every three years.   -Other screening tests & preventive services for persons with diabetes include: an eye exam to screen for diabetic retinopathy, a kidney function test, a foot exam, and stricter control over your cholesterol.   -Cardiovascular screening for adults with routine risk involves an electrocardiogram (ECG) at intervals determined by the provider.   -Colorectal cancer screening should be done for adults age 54-65 with no increased risk factors for colorectal cancer. There are a number of acceptable methods of screening for this type of cancer. Each test has its own benefits and drawbacks. Discuss with your provider what is most appropriate for you during your annual wellness visit. The different tests include: colonoscopy (considered the best screening method), a fecal occult blood test, a fecal DNA test, and sigmoidoscopy.  -All adults born between [facility] should be screened once for Hepatitis C.  -An Abdominal Aortic Aneurysm (AAA) Screening is recommended for men age 73-68 who has ever smoked in their lifetime.      Here is a list of your current Health Maintenance items (your personalized list of preventive services) with a due date:  Health Maintenance Due   Topic Date Due    COVID-19 Vaccine (1) Never done    Shingles Vaccine (1 of 2) Never done    Annual Well Visit  04/08/2021

## 2021-08-05 LAB
ALBUMIN SERPL-MCNC: 4.1 G/DL (ref 3.7–4.7)
ALBUMIN/GLOB SERPL: 1.1 {RATIO} (ref 1.2–2.2)
ALP SERPL-CCNC: 78 IU/L (ref 48–121)
ALT SERPL-CCNC: 12 IU/L (ref 0–44)
AST SERPL-CCNC: 16 IU/L (ref 0–40)
BILIRUB SERPL-MCNC: 0.3 MG/DL (ref 0–1.2)
BUN SERPL-MCNC: 20 MG/DL (ref 8–27)
BUN/CREAT SERPL: 14 (ref 10–24)
CALCIUM SERPL-MCNC: 9.7 MG/DL (ref 8.6–10.2)
CHLORIDE SERPL-SCNC: 102 MMOL/L (ref 96–106)
CO2 SERPL-SCNC: 22 MMOL/L (ref 20–29)
CREAT SERPL-MCNC: 1.38 MG/DL (ref 0.76–1.27)
EST. AVERAGE GLUCOSE BLD GHB EST-MCNC: 128 MG/DL
GLOBULIN SER CALC-MCNC: 3.8 G/DL (ref 1.5–4.5)
GLUCOSE SERPL-MCNC: 108 MG/DL (ref 65–99)
HBA1C MFR BLD: 6.1 % (ref 4.8–5.6)
POTASSIUM SERPL-SCNC: 4.4 MMOL/L (ref 3.5–5.2)
PROT SERPL-MCNC: 7.9 G/DL (ref 6–8.5)
SODIUM SERPL-SCNC: 138 MMOL/L (ref 134–144)

## 2021-08-05 NOTE — PROGRESS NOTES
Ryan Le,  A1C has improved by 0.1, kidney function looks to have improved as well.   See you in 6 months

## 2021-09-26 RX ORDER — BENAZEPRIL HYDROCHLORIDE 40 MG/1
TABLET ORAL
Qty: 90 TABLET | Refills: 0 | Status: SHIPPED | OUTPATIENT
Start: 2021-09-26 | End: 2021-10-03

## 2021-10-03 RX ORDER — BENAZEPRIL HYDROCHLORIDE 40 MG/1
TABLET ORAL
Qty: 90 TABLET | Refills: 0 | Status: SHIPPED | OUTPATIENT
Start: 2021-10-03 | End: 2022-03-27

## 2021-10-10 RX ORDER — HYDROCHLOROTHIAZIDE 12.5 MG/1
CAPSULE ORAL
Qty: 90 CAPSULE | Refills: 0 | Status: SHIPPED | OUTPATIENT
Start: 2021-10-10 | End: 2022-01-02

## 2021-10-18 RX ORDER — ALLOPURINOL 300 MG/1
TABLET ORAL
Qty: 90 TABLET | Refills: 0 | Status: SHIPPED | OUTPATIENT
Start: 2021-10-18 | End: 2022-01-24

## 2021-10-25 RX ORDER — LEVOTHYROXINE SODIUM 75 UG/1
TABLET ORAL
Qty: 90 TABLET | Refills: 0 | Status: SHIPPED | OUTPATIENT
Start: 2021-10-25 | End: 2022-01-17

## 2021-10-28 DIAGNOSIS — E78.00 HYPERCHOLESTEROLEMIA: ICD-10-CM

## 2021-10-28 RX ORDER — LOVASTATIN 20 MG/1
TABLET ORAL
Qty: 90 TABLET | Refills: 0 | Status: SHIPPED | OUTPATIENT
Start: 2021-10-28 | End: 2022-01-24

## 2022-01-02 RX ORDER — HYDROCHLOROTHIAZIDE 12.5 MG/1
CAPSULE ORAL
Qty: 90 CAPSULE | Refills: 0 | Status: SHIPPED | OUTPATIENT
Start: 2022-01-02 | End: 2022-03-27

## 2022-01-03 ENCOUNTER — HOSPITAL ENCOUNTER (OUTPATIENT)
Dept: PREADMISSION TESTING | Age: 82
Discharge: HOME | End: 2022-01-03
Payer: MEDICARE

## 2022-01-03 PROCEDURE — U0005 INFEC AGEN DETEC AMPLI PROBE: HCPCS

## 2022-01-04 LAB
SARS-COV-2, XPLCVT: NOT DETECTED
SOURCE, COVRS: NORMAL

## 2022-01-06 ENCOUNTER — APPOINTMENT (OUTPATIENT)
Dept: GENERAL RADIOLOGY | Age: 82
End: 2022-01-06
Attending: INTERNAL MEDICINE
Payer: MEDICARE

## 2022-01-06 ENCOUNTER — HOSPITAL ENCOUNTER (OUTPATIENT)
Age: 82
Discharge: HOME | End: 2022-01-06
Attending: INTERNAL MEDICINE | Admitting: INTERNAL MEDICINE
Payer: MEDICARE

## 2022-01-06 VITALS
HEART RATE: 62 BPM | DIASTOLIC BLOOD PRESSURE: 64 MMHG | SYSTOLIC BLOOD PRESSURE: 134 MMHG | BODY MASS INDEX: 25.2 KG/M2 | HEIGHT: 70 IN | TEMPERATURE: 97.6 F | WEIGHT: 176 LBS | RESPIRATION RATE: 16 BRPM | OXYGEN SATURATION: 92 %

## 2022-01-06 DIAGNOSIS — I49.5 SSS (SICK SINUS SYNDROME) (HCC): ICD-10-CM

## 2022-01-06 PROCEDURE — 99152 MOD SED SAME PHYS/QHP 5/>YRS: CPT | Performed by: INTERNAL MEDICINE

## 2022-01-06 PROCEDURE — 74011000636 HC RX REV CODE- 636: Performed by: INTERNAL MEDICINE

## 2022-01-06 PROCEDURE — C1894 INTRO/SHEATH, NON-LASER: HCPCS | Performed by: INTERNAL MEDICINE

## 2022-01-06 PROCEDURE — 2709999900 HC NON-CHARGEABLE SUPPLY: Performed by: INTERNAL MEDICINE

## 2022-01-06 PROCEDURE — 99153 MOD SED SAME PHYS/QHP EA: CPT | Performed by: INTERNAL MEDICINE

## 2022-01-06 PROCEDURE — 77030002996 HC SUT SLK J&J -A: Performed by: INTERNAL MEDICINE

## 2022-01-06 PROCEDURE — 77030018729 HC ELECTRD DEFIB PAD CARD -B: Performed by: INTERNAL MEDICINE

## 2022-01-06 PROCEDURE — 77030039266 HC ADH SKN EXOFIN S2SG -A: Performed by: INTERNAL MEDICINE

## 2022-01-06 PROCEDURE — 33208 INSRT HEART PM ATRIAL & VENT: CPT | Performed by: INTERNAL MEDICINE

## 2022-01-06 PROCEDURE — 77030037400 HC ADH TISS HI VISC EXOFIN CHMP -B: Performed by: INTERNAL MEDICINE

## 2022-01-06 PROCEDURE — 71045 X-RAY EXAM CHEST 1 VIEW: CPT

## 2022-01-06 PROCEDURE — 74011000272 HC RX REV CODE- 272: Performed by: INTERNAL MEDICINE

## 2022-01-06 PROCEDURE — 74011250636 HC RX REV CODE- 250/636: Performed by: INTERNAL MEDICINE

## 2022-01-06 PROCEDURE — 77030031139 HC SUT VCRL2 J&J -A: Performed by: INTERNAL MEDICINE

## 2022-01-06 PROCEDURE — C1898 LEAD, PMKR, OTHER THAN TRANS: HCPCS | Performed by: INTERNAL MEDICINE

## 2022-01-06 PROCEDURE — C1892 INTRO/SHEATH,FIXED,PEEL-AWAY: HCPCS | Performed by: INTERNAL MEDICINE

## 2022-01-06 PROCEDURE — C1785 PMKR, DUAL, RATE-RESP: HCPCS | Performed by: INTERNAL MEDICINE

## 2022-01-06 DEVICE — IPG W1DR01 AZURE XT DR MRI USA
Type: IMPLANTABLE DEVICE | Status: FUNCTIONAL
Brand: AZURE™ XT DR MRI SURESCAN™

## 2022-01-06 DEVICE — LEAD 5076-58 MRI US RCMCRD
Type: IMPLANTABLE DEVICE | Status: FUNCTIONAL
Brand: CAPSUREFIX NOVUS MRI™ SURESCAN®

## 2022-01-06 DEVICE — LEAD 5076-52 MRI US RCMCRD
Type: IMPLANTABLE DEVICE | Status: FUNCTIONAL
Brand: CAPSUREFIX NOVUS MRI™ SURESCAN®

## 2022-01-06 RX ORDER — FENTANYL CITRATE 50 UG/ML
INJECTION, SOLUTION INTRAMUSCULAR; INTRAVENOUS AS NEEDED
Status: DISCONTINUED | OUTPATIENT
Start: 2022-01-06 | End: 2022-01-06 | Stop reason: HOSPADM

## 2022-01-06 RX ORDER — HEPARIN SODIUM 200 [USP'U]/100ML
INJECTION, SOLUTION INTRAVENOUS
Status: COMPLETED | OUTPATIENT
Start: 2022-01-06 | End: 2022-01-06

## 2022-01-06 RX ORDER — MIDAZOLAM HYDROCHLORIDE 1 MG/ML
INJECTION, SOLUTION INTRAMUSCULAR; INTRAVENOUS AS NEEDED
Status: DISCONTINUED | OUTPATIENT
Start: 2022-01-06 | End: 2022-01-06 | Stop reason: HOSPADM

## 2022-01-06 NOTE — PROGRESS NOTES
Primary Nurse Barbera Pallas and Genoveva Owusu, RN performed a dual skin assessment on this patient No impairment noted  Aramis score is 23

## 2022-01-06 NOTE — Clinical Note
Left subclavian vein. Accessed successfully. Micropuncture needle used. Using fluoro guidance. Number of attempts =  1.  [id number]

## 2022-01-06 NOTE — DISCHARGE INSTRUCTIONS
DISCHARGE INSTRUCTIONS FOR PATIENTS WITH PACEMAKERS    You had a dual chamber pacemaker implanted by Dr. Trav George on 1/6/2022. OK to stop flecainide, but don't change any other medications. 1. Remember to call for an appointment in 2-4 weeks to check healing and implant programming with Dr. Mili Lim office. 2. Medic Alert Bracelets are available from your pharmacist to wear at all times if you choose to wear one. 3. Carry your ID card for pacemaker with you at all times. This card will be given to you in the hospital or mailed to you. 4. The pacemaker will bulge slightly under your skin. The bulge will decrease in size over the next few weeks. Please notify the doctor's office if you notice any of the following around your site:   A.  A bruise that does not go away. B.  Soreness or yellow, green, or brown drainage from the site. C. Any swelling from the site. D. If you have a fever of 100 degrees or higher that lasts for a few days. INCISION CARE     1.  Leave skin glue over your site until it starts to fall off, usually in a few weeks. 2.  You may shower after 3 days as long as your incision isnt submerged or directly sprayed upon until well healed. 3.  For comfort, wear loose fitting clothing. 4.  Report any signs of infection, fever, pain, swelling, redness, oozing, or heat at site especially if these symptoms increase after the first 3 to 4 days. ACTIVITY PRECAUTIONS   1. Avoid rough contact with the implant site. 2. No driving for 14 days. 3. Avoid lifting your arm over your head, carrying anything on the affected side, or lifting over 10 pounds for 30 days. For the first 2 days only bend your arm at the elbow. 4. Any extreme activity such as golf, weight lifting or exercise biking should be restricted for 60 days. 5. Do not carry objects by holding them against your implant site. 6.  No shooting rifles or any type of gun with the affected shoulder permanently.     SPECIAL PRECAUTIONS  1. You should avoid all strong magnetic fields, such as arc welding, large transformers, large motors. 2.  You may not have an MRI which uses a strong magnet to take pictures. 3.  Treatments or surgery that requires diathermy or electrocautery should be discussed with your doctor before scheduled. 4. Avoid radio frequency transmitters, including radar. 5. Advise dentist or other medical personnel you see that you have a pacemaker. 6.  Cell phones and microwave oven use is okay. 7.  If you plan to move or take a trip to a new area, the doctor's office will give you a name of a doctor to contact for any problems. ANTIBIOTIC THERAPY  During the first 8 weeks after your pacemaker insertion, you may need antibiotics before any dental work or certain tests or operations. Let the dentist or doctor who is caring for you know that you have had an implanted device.       Signed By: Cal Ramirez MD     January 6, 2022

## 2022-01-06 NOTE — H&P
EP/ ARRHYTHMIA    Patient ID:  Patient: Madelyn Millan  MRN: [id number]  Age: 80 y.o.  : 1940    Date of  Admission: 2022 11:45 AM   PCP:  Bianka Bay PA-C    Assessment: 1. Pacemaker candidate. Plan:     1. Given the potential benefits, risks, and alternatives, he agrees to proceed with pacemaker implantation. Madelyn Millan is a 80 y.o. male with a history of the above here for his procedure. 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        Past Surgical History:   Procedure Laterality Date    HX CATARACT REMOVAL Right 2016    HX HEENT      nose polyps    HX ORTHOPAEDIC      L elbow surgery       Social History     Tobacco Use    Smoking status: Former Smoker     Packs/day: 1.00     Years: 16.00     Pack years: 16.00     Quit date: 3/12/1988     Years since quittin.8    Smokeless tobacco: Never Used   Substance Use Topics    Alcohol use: No        Family History   Problem Relation Age of Onset    Hypertension Mother     Stroke Mother     Heart Disease Brother         late 61    Cancer Brother         Lung    Stroke Father     Cancer Sister         Lung        Allergies   Allergen Reactions    Bee Sting [Sting, Bee] Anaphylaxis    Pcn [Penicillins] Swelling          No current facility-administered medications for this encounter. Review of Symptoms:  See OV.        Objective:      Physical Exam:  Temp (24hrs), Av.5 °F (36.4 °C), Min:97.5 °F (36.4 °C), Max:97.5 °F (36.4 °C)    Patient Vitals for the past 8 hrs:   Pulse   22 1200 67    Patient Vitals for the past 8 hrs:   Resp   22 1200 18    Patient Vitals for the past 8 hrs:   BP   22 1200 (!) 176/69 No intake or output data in the 24 hours ending 01/06/22 1318    Nondiaphoretic, not in acute distress. Neuro grossly nonfocal.  No tremor. Awake and appropriate.       Jodee Butcher MD  1/6/2022

## 2022-01-06 NOTE — PROGRESS NOTES
Cardiac Cath Lab Recovery Arrival Note:      Carolyn Simpson arrived to Cardiac Cath Lab, Recovery Area. Staff introduced to patient. Patient identifiers verified with NAME and DATE OF BIRTH. Procedure verified with patient. Consent forms reviewed and signed by patient or authorized representative and verified. Allergies verified. Patient and family oriented to department. Patient and family informed of procedure and plan of care. Questions answered with review. Patient prepped for procedure, per orders from physician, prior to arrival.    Patient on cardiac monitor, non-invasive blood pressure, SPO2 monitor. On RA. Patient is A&Ox 4. Patient reports no complaints. Patient in stretcher, in low position, with side rails up, call bell within reach, patient instructed to call if assistance as needed. Patient prep in: [address], [place]. Patient family has pager # none  Family in: wife- Rilla Covert waiting area.    Prep by: regina marlow and reyes marlow

## 2022-01-06 NOTE — Clinical Note
TRANSFER - OUT REPORT:     Verbal report given to: Cheryl Naqvi, (at bedside). Report consisted of patient's Situation, Background, Assessment and   Recommendations(SBAR). Opportunity for questions and clarification was provided. Patient transported with a Registered Nurse. [facility]

## 2022-01-06 NOTE — Clinical Note
A venogram was performed on the Other (document in comment). Injected with single hand injection. Injection volume  = 15 mL.

## 2022-01-07 NOTE — PROGRESS NOTES
1930 - Bedside report from [facility]. A paced. No pain, no complaints. L arm sling on, Ice pack to incision, benign. 2000 - Walked in hallway without difficulty or complaints. L chest incision benign. IV out / tele off. Written  DC instructions reviewed by Radha Heaton. ARISTEO w belongings to care of wife.

## 2022-01-17 RX ORDER — LEVOTHYROXINE SODIUM 75 UG/1
TABLET ORAL
Qty: 90 TABLET | Refills: 0 | Status: SHIPPED | OUTPATIENT
Start: 2022-01-17 | End: 2022-04-10

## 2022-01-24 DIAGNOSIS — E78.00 HYPERCHOLESTEROLEMIA: ICD-10-CM

## 2022-01-24 RX ORDER — LOVASTATIN 20 MG/1
TABLET ORAL
Qty: 90 TABLET | Refills: 0 | Status: SHIPPED | OUTPATIENT
Start: 2022-01-24 | End: 2022-04-29

## 2022-01-24 RX ORDER — ALLOPURINOL 300 MG/1
TABLET ORAL
Qty: 90 TABLET | Refills: 0 | Status: SHIPPED | OUTPATIENT
Start: 2022-01-24 | End: 2022-04-27

## 2022-02-07 RX ORDER — AMLODIPINE BESYLATE 10 MG/1
TABLET ORAL
Qty: 90 TABLET | Refills: 0 | Status: SHIPPED | OUTPATIENT
Start: 2022-02-07 | End: 2022-04-27

## 2022-02-08 ENCOUNTER — OFFICE VISIT (OUTPATIENT)
Dept: INTERNAL MEDICINE CLINIC | Age: 82
End: 2022-02-08
Payer: MEDICARE

## 2022-02-08 VITALS
TEMPERATURE: 97.8 F | BODY MASS INDEX: 25.77 KG/M2 | HEIGHT: 70 IN | WEIGHT: 180 LBS | HEART RATE: 64 BPM | OXYGEN SATURATION: 96 % | RESPIRATION RATE: 18 BRPM | DIASTOLIC BLOOD PRESSURE: 73 MMHG | SYSTOLIC BLOOD PRESSURE: 134 MMHG

## 2022-02-08 DIAGNOSIS — Z12.5 PROSTATE CANCER SCREENING: ICD-10-CM

## 2022-02-08 DIAGNOSIS — I10 ESSENTIAL HYPERTENSION: Primary | ICD-10-CM

## 2022-02-08 DIAGNOSIS — I49.5 SSS (SICK SINUS SYNDROME) (HCC): ICD-10-CM

## 2022-02-08 DIAGNOSIS — R73.02 GLUCOSE INTOLERANCE (IMPAIRED GLUCOSE TOLERANCE): ICD-10-CM

## 2022-02-08 DIAGNOSIS — E78.00 HYPERCHOLESTEROLEMIA: ICD-10-CM

## 2022-02-08 DIAGNOSIS — N18.32 STAGE 3B CHRONIC KIDNEY DISEASE (HCC): ICD-10-CM

## 2022-02-08 DIAGNOSIS — I48.20 CHRONIC ATRIAL FIBRILLATION (HCC): ICD-10-CM

## 2022-02-08 DIAGNOSIS — Z95.0 PACEMAKER: ICD-10-CM

## 2022-02-08 DIAGNOSIS — E03.4 HYPOTHYROIDISM DUE TO ACQUIRED ATROPHY OF THYROID: ICD-10-CM

## 2022-02-08 PROCEDURE — G8754 DIAS BP LESS 90: HCPCS | Performed by: PHYSICIAN ASSISTANT

## 2022-02-08 PROCEDURE — 99214 OFFICE O/P EST MOD 30 MIN: CPT | Performed by: PHYSICIAN ASSISTANT

## 2022-02-08 PROCEDURE — G8752 SYS BP LESS 140: HCPCS | Performed by: PHYSICIAN ASSISTANT

## 2022-02-08 PROCEDURE — G8536 NO DOC ELDER MAL SCRN: HCPCS | Performed by: PHYSICIAN ASSISTANT

## 2022-02-08 PROCEDURE — G8427 DOCREV CUR MEDS BY ELIG CLIN: HCPCS | Performed by: PHYSICIAN ASSISTANT

## 2022-02-08 PROCEDURE — G8419 CALC BMI OUT NRM PARAM NOF/U: HCPCS | Performed by: PHYSICIAN ASSISTANT

## 2022-02-08 PROCEDURE — 1101F PT FALLS ASSESS-DOCD LE1/YR: CPT | Performed by: PHYSICIAN ASSISTANT

## 2022-02-08 PROCEDURE — G8432 DEP SCR NOT DOC, RNG: HCPCS | Performed by: PHYSICIAN ASSISTANT

## 2022-02-08 RX ORDER — FLECAINIDE ACETATE 100 MG/1
100 TABLET ORAL EVERY 12 HOURS
COMMUNITY
Start: 2022-01-14

## 2022-02-08 NOTE — PROGRESS NOTES
Identified pt with two pt identifiers(name and ). Reviewed record in preparation for visit and have obtained necessary documentation. Chief Complaint   Patient presents with    Hypertension    Cholesterol Problem    Thyroid Problem      22 pt had a pacemaker put in. No other problems today. Vitals noted and good. Pt feels better overall since having the pacemaker put in, but the first few days were rough and pt wanted to pass out at times from feeling light headed and dizzy. Follow-up  The history is provided by the patient and spouse. This is a new problem. The current episode started more than 1 week ago. The problem occurs constantly. Pertinent negatives include no chest pain, no abdominal pain, no headaches and no shortness of breath. The treatment provided significant relief. Review of Systems   Constitutional: Negative. Eyes: Negative. Respiratory: Negative. Negative for shortness of breath. Cardiovascular: Negative for chest pain, palpitations and leg swelling. Gastrointestinal: Negative. Negative for abdominal pain. Genitourinary: Negative. Skin: Negative. Neurological: Negative for headaches. Physical Activity:     Days of Exercise per Week: Not on file    Minutes of Exercise per Session: Not on file           Vitals:    22 1319   BP: 134/73   Pulse: 64   Resp: 18   Temp: 97.8 °F (36.6 °C)   TempSrc: Oral   SpO2: 96%   Weight: 180 lb (81.6 kg)   Height: 5' 10\" (1.778 m)   PainSc:   0 - No pain       Health Maintenance Due   Topic    Shingrix Vaccine Age 50> (1 of 2)    Lipid Screen        Coordination of Care Questionnaire:  :   1) Have you been to an emergency room, urgent care, or hospitalized since your last visit? If yes, where when, and reason for visit? no       2. Have seen or consulted any other health care provider since your last visit? If yes, where when, and reason for visit?   NO      Patient is accompanied by self I have received verbal consent from Radha Reveal to discuss any/all medical information while they are present in the room.

## 2022-02-08 NOTE — PROGRESS NOTES
Dewayne French is a 80y.o. year old male seen in clinic today for   Chief Complaint   Patient presents with    Hypertension    Cholesterol Problem    Thyroid Problem       he is here today to follow up for HTN, HLD, IFG, A-fib with new pacemaker placed by Dr. Erika Mitchell. Pacemaker placed 1/6/22. Shortly afterward, felt like it wasn't working properly, called cardiology. Instructed to go back on flecainide. Follows with Gelacio Moreno otherwise for Cardiology    Hypertension: Controlled with 25 metoprolol, hctz 12.5, lotensin 40 mg, amlodipine 10 mg  Compliant with medications? yes  Compliant with diet? yes  Compliant with exercise? Very active, walking and lifting weights daily  Average blood pressure readings outside of office. 130/70    Denies any HA, dizziness, CP, SOB, edema, visual changes    Patient has has hx of HLD. Takes Lovastatin 20 mg. No RUQ pain, no jaundice, no muscle aches. he specifically denies any CP, SOB, HA. Dizziness, fevers, chills, N/V/D, urinary symptoms or other bowel changes. Current Outpatient Medications on File Prior to Visit   Medication Sig Dispense Refill    flecainide (TAMBOCOR) 100 mg tablet Take 100 mg by mouth every twelve (12) hours.  amLODIPine (NORVASC) 10 mg tablet Take 1 tablet by mouth once daily 90 Tablet 0    allopurinoL (ZYLOPRIM) 300 mg tablet Take 1 tablet by mouth once daily 90 Tablet 0    lovastatin (MEVACOR) 20 mg tablet Take 1 tablet by mouth nightly 90 Tablet 0    levothyroxine (SYNTHROID) 75 mcg tablet TAKE 1 TABLET BY MOUTH ONCE DAILY BEFORE BREAKFAST FOR THYROID 90 Tablet 0    hydroCHLOROthiazide (MICROZIDE) 12.5 mg capsule Take 1 capsule by mouth once daily 90 Capsule 0    benazepriL (LOTENSIN) 40 mg tablet TAKE 1 TABLET BY MOUTH ONCE DAILY  **APPOINTMENT REQUIRED FOR FURTHER REFILLS** 90 Tablet 0    metoprolol tartrate (LOPRESSOR) 50 mg tablet 25 mg daily.  aspirin delayed-release 81 mg tablet Take 1 Tab by mouth daily.  80 Tab 3     No current facility-administered medications on file prior to visit.          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      Past Surgical History:   Procedure Laterality Date    HX CATARACT REMOVAL Right 2016    HX HEENT      nose polyps    HX ORTHOPAEDIC      L elbow surgery    HX PACEMAKER PLACEMENT N/A 2022        Family History   Problem Relation Age of Onset    Hypertension Mother     Stroke Mother     Heart Disease Brother         late 61    Cancer Brother         Lung    Stroke Father     Cancer Sister         Lung        Social History     Socioeconomic History    Marital status:      Spouse name: Not on file    Number of children: Not on file    Years of education: Not on file    Highest education level: Not on file   Occupational History    Not on file   Tobacco Use    Smoking status: Former Smoker     Packs/day: 1.00     Years: 16.00     Pack years: 16.00     Quit date: 3/12/1988     Years since quittin.9    Smokeless tobacco: Never Used   Vaping Use    Vaping Use: Never used   Substance and Sexual Activity    Alcohol use: No    Drug use: Not Currently     Types: Prescription, Marijuana     Comment: \"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\"    Sexual activity: Yes     Partners: Female   Other Topics Concern    Not on file   Social History Narrative    Not on file     Social Determinants of Health     Financial Resource Strain:     Difficulty of Paying Living Expenses: Not on file   Food Insecurity:     Worried About Running Out of Food in the Last Year: Not on file    Stephen of Food in the Last Year: Not on file   Transportation Needs:     Lack of Transportation (Medical): Not on file    Lack of Transportation (Non-Medical): Not on file   Physical Activity:     Days of Exercise per Week: Not on file    Minutes of Exercise per Session: Not on file   Stress:     Feeling of Stress : Not on file   Social Connections:     Frequency of Communication with Friends and Family: Not on file    Frequency of Social Gatherings with Friends and Family: Not on file    Attends Scientologist Services: Not on file    Active Member of [address] [place] [address] or Organizations: Not on file    Attends Club or Organization Meetings: Not on file    Marital Status: Not on file   Intimate Partner Violence:     Fear of Current or Ex-Partner: Not on file    Emotionally Abused: Not on file    Physically Abused: Not on file    Sexually Abused: Not on file   Housing Stability:     Unable to Pay for Housing in the Last Year: Not on file    Number of [place] in the Last Year: Not on file    Unstable Housing in the Last Year: Not on file           Visit Vitals  /73 (BP 1 Location: Left arm, BP Patient Position: Sitting, BP Cuff Size: Adult)   Pulse 64   Temp 97.8 °F (36.6 °C) (Oral)   Resp 18   Ht 5' 10\" (1.778 m)   Wt 180 lb (81.6 kg)   SpO2 96%   BMI 25.83 kg/m²       Review of Systems   Constitutional: Negative for chills, fever, malaise/fatigue and weight loss. Respiratory: Negative for cough, shortness of breath and wheezing. Cardiovascular: Negative for chest pain, palpitations and leg swelling. Gastrointestinal: Negative for abdominal pain, blood in stool, constipation, diarrhea, heartburn, melena, nausea and vomiting. Genitourinary: Negative for dysuria and frequency. Musculoskeletal: Negative for myalgias. Skin: Negative for rash. Neurological: Negative for dizziness, weakness and headaches. Endo/Heme/Allergies: Does not bruise/bleed easily. Psychiatric/Behavioral: Negative for depression.    All other systems reviewed and are negative. Physical Exam  Vitals and nursing note reviewed. Constitutional:       Appearance: Normal appearance. HENT:      Head: Normocephalic and atraumatic. Right Ear: External ear normal.      Left Ear: External ear normal.      Nose: Nose normal.      Mouth/Throat:      Mouth: Mucous membranes are moist.      Pharynx: Oropharynx is clear. No oropharyngeal exudate or posterior oropharyngeal erythema. Eyes:      Conjunctiva/sclera: Conjunctivae normal.      Pupils: Pupils are equal, round, and reactive to light. Neck:      Vascular: No carotid bruit. Cardiovascular:      Rate and Rhythm: Normal rate and regular rhythm. Pulses: Normal pulses. Heart sounds: Normal heart sounds. No murmur heard. Pulmonary:      Effort: Pulmonary effort is normal.      Breath sounds: Normal breath sounds. No stridor. No wheezing, rhonchi or rales. Abdominal:      General: Abdomen is flat. Bowel sounds are normal. There is no distension. Tenderness: There is no abdominal tenderness. There is no guarding. Musculoskeletal:         General: Normal range of motion. Cervical back: Normal range of motion and neck supple. No rigidity. Comments: Pacemaker L chest wall, well healed. Skin:     General: Skin is dry. Capillary Refill: Capillary refill takes less than 2 seconds. Comments: Tattoos to chest     Neurological:      General: No focal deficit present. Mental Status: He is oriented to person, place, and time. Mental status is at baseline. Psychiatric:         Mood and Affect: Mood normal.          No results found for this or any previous visit (from the past 24 hour(s)). ASSESSMENT AND PLAN  Diagnoses and all orders for this visit:    1. Essential hypertension  -     METABOLIC PANEL, COMPREHENSIVE; Future  At goal with current therapy   2. Stage 3b chronic kidney disease (HCC)  -     METABOLIC PANEL, COMPREHENSIVE;  Future  -     CBC WITH AUTOMATED DIFF; Future  Recheck GFR. Avoid NSAIDS  3. Hypothyroidism due to acquired atrophy of thyroid  -     THYROID CASCADE PROFILE; Future  Recheck thyroid. On 75 mcg  4. Glucose intolerance (impaired glucose tolerance)  Lab Results   Component Value Date/Time    Hemoglobin A1c 6.1 (H) 08/04/2021 11:15 AM    Hemoglobin A1c (POC) 6.2 01/20/2021 01:10 PM       5. Hypercholesterolemia  -     LIPID PANEL; Future  -     METABOLIC PANEL, COMPREHENSIVE; Future  Recheck lipids. Continue Lovastatin  6. Pacemaker  No issues  7. Chronic atrial fibrillation ([address]  Pacemaker in place. On flecanine per Travis  8. SSS (sick sinus syndrome) ([address]  Pacemaker in place  9. Prostate cancer screening  -     PSA SCREENING (SCREENING); Future  Nocturia x 3, some mild dribbling          I have discussed the diagnosis with the patient and the intended plan as seen in the above orders. Patient is in agreement. The patient has received an after-visit summary and questions were answered concerning future plans. I have discussed medication side effects and warnings with the patient as well.     Lorie Faust PA-C

## 2022-03-18 PROBLEM — Z95.0 PACEMAKER: Status: ACTIVE | Noted: 2022-02-08

## 2022-03-19 PROBLEM — I48.20 CHRONIC ATRIAL FIBRILLATION (HCC): Status: ACTIVE | Noted: 2022-02-08

## 2022-03-19 PROBLEM — I49.5 SSS (SICK SINUS SYNDROME) (HCC): Status: ACTIVE | Noted: 2022-02-08

## 2022-03-19 PROBLEM — N18.32 STAGE 3B CHRONIC KIDNEY DISEASE (HCC): Status: ACTIVE | Noted: 2021-01-28

## 2022-03-27 RX ORDER — HYDROCHLOROTHIAZIDE 12.5 MG/1
CAPSULE ORAL
Qty: 90 CAPSULE | Refills: 0 | Status: SHIPPED | OUTPATIENT
Start: 2022-03-27 | End: 2022-07-04

## 2022-03-27 RX ORDER — BENAZEPRIL HYDROCHLORIDE 40 MG/1
TABLET ORAL
Qty: 90 TABLET | Refills: 0 | Status: SHIPPED | OUTPATIENT
Start: 2022-03-27 | End: 2022-04-27

## 2022-04-10 RX ORDER — LEVOTHYROXINE SODIUM 75 UG/1
TABLET ORAL
Qty: 90 TABLET | Refills: 0 | Status: SHIPPED | OUTPATIENT
Start: 2022-04-10 | End: 2022-07-10

## 2022-04-27 RX ORDER — AMLODIPINE BESYLATE 10 MG/1
TABLET ORAL
Qty: 90 TABLET | Refills: 0 | Status: SHIPPED | OUTPATIENT
Start: 2022-04-27 | End: 2022-08-07

## 2022-04-27 RX ORDER — ALLOPURINOL 300 MG/1
TABLET ORAL
Qty: 90 TABLET | Refills: 0 | Status: SHIPPED | OUTPATIENT
Start: 2022-04-27 | End: 2022-07-17

## 2022-04-27 RX ORDER — BENAZEPRIL HYDROCHLORIDE 40 MG/1
TABLET ORAL
Qty: 90 TABLET | Refills: 0 | Status: SHIPPED | OUTPATIENT
Start: 2022-04-27 | End: 2022-07-01 | Stop reason: SDUPTHER

## 2022-04-29 DIAGNOSIS — E78.00 HYPERCHOLESTEROLEMIA: ICD-10-CM

## 2022-04-29 RX ORDER — LOVASTATIN 20 MG/1
TABLET ORAL
Qty: 90 TABLET | Refills: 0 | Status: SHIPPED | OUTPATIENT
Start: 2022-04-29 | End: 2022-07-24

## 2022-06-24 ENCOUNTER — APPOINTMENT (OUTPATIENT)
Dept: GENERAL RADIOLOGY | Age: 82
End: 2022-06-24
Attending: STUDENT IN AN ORGANIZED HEALTH CARE EDUCATION/TRAINING PROGRAM
Payer: MEDICARE

## 2022-06-24 ENCOUNTER — HOSPITAL ENCOUNTER (EMERGENCY)
Age: 82
Discharge: HOME | End: 2022-06-24
Attending: STUDENT IN AN ORGANIZED HEALTH CARE EDUCATION/TRAINING PROGRAM
Payer: MEDICARE

## 2022-06-24 VITALS
BODY MASS INDEX: 25.77 KG/M2 | WEIGHT: 180 LBS | HEART RATE: 64 BPM | RESPIRATION RATE: 18 BRPM | HEIGHT: 70 IN | TEMPERATURE: 98.3 F | DIASTOLIC BLOOD PRESSURE: 62 MMHG | SYSTOLIC BLOOD PRESSURE: 140 MMHG | OXYGEN SATURATION: 97 %

## 2022-06-24 DIAGNOSIS — E87.1 HYPONATREMIA: ICD-10-CM

## 2022-06-24 DIAGNOSIS — U07.1 COVID-19 VIRUS INFECTION: Primary | ICD-10-CM

## 2022-06-24 DIAGNOSIS — N17.9 AKI (ACUTE KIDNEY INJURY) (HCC): ICD-10-CM

## 2022-06-24 DIAGNOSIS — R29.898 COMPLAINTS OF WEAKNESS OF LOWER EXTREMITY: ICD-10-CM
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
## 2022-06-24 LAB
ALBUMIN SERPL-MCNC: 3.4 G/DL (ref 3.5–5)
ALBUMIN/GLOB SERPL: 0.7 {RATIO} (ref 1.1–2.2)
ALP SERPL-CCNC: 71 U/L (ref 45–117)
ALT SERPL-CCNC: 27 U/L (ref 12–78)
ANION GAP SERPL CALC-SCNC: 4 MMOL/L (ref 5–15)
APPEARANCE UR: CLEAR
AST SERPL-CCNC: 40 U/L (ref 15–37)
BACTERIA URNS QL MICRO: ABNORMAL /HPF
BASOPHILS # BLD: 0 K/UL (ref 0–0.1)
BASOPHILS NFR BLD: 1 % (ref 0–1)
BILIRUB SERPL-MCNC: 0.2 MG/DL (ref 0.2–1)
BILIRUB UR QL: NEGATIVE
BUN SERPL-MCNC: 27 MG/DL (ref 6–20)
BUN/CREAT SERPL: 13 (ref 12–20)
CALCIUM SERPL-MCNC: 8.9 MG/DL (ref 8.5–10.1)
CHLORIDE SERPL-SCNC: 98 MMOL/L (ref 97–108)
CK SERPL-CCNC: 250 U/L (ref 39–308)
CO2 SERPL-SCNC: 26 MMOL/L (ref 21–32)
COLOR UR: ABNORMAL
COVID-19 RAPID TEST, COVR: DETECTED
CREAT SERPL-MCNC: 2.06 MG/DL (ref 0.7–1.3)
DIFFERENTIAL METHOD BLD: ABNORMAL
EOSINOPHIL # BLD: 0.1 K/UL (ref 0–0.4)
EOSINOPHIL NFR BLD: 2 % (ref 0–7)
EPITH CASTS URNS QL MICRO: ABNORMAL /LPF
ERYTHROCYTE [DISTWIDTH] IN BLOOD BY AUTOMATED COUNT: 14.6 % (ref 11.5–14.5)
GLOBULIN SER CALC-MCNC: 4.8 G/DL (ref 2–4)
GLUCOSE SERPL-MCNC: 96 MG/DL (ref 65–100)
GLUCOSE UR STRIP.AUTO-MCNC: NEGATIVE MG/DL
HCT VFR BLD AUTO: 40.9 % (ref 36.6–50.3)
HGB BLD-MCNC: 13.7 G/DL (ref 12.1–17)
HGB UR QL STRIP: NEGATIVE
HYALINE CASTS URNS QL MICRO: ABNORMAL /LPF (ref 0–5)
IMM GRANULOCYTES # BLD AUTO: 0 K/UL (ref 0–0.04)
IMM GRANULOCYTES NFR BLD AUTO: 1 % (ref 0–0.5)
KETONES UR QL STRIP.AUTO: NEGATIVE MG/DL
LEUKOCYTE ESTERASE UR QL STRIP.AUTO: NEGATIVE
LYMPHOCYTES # BLD: 0.9 K/UL (ref 0.8–3.5)
LYMPHOCYTES NFR BLD: 25 % (ref 12–49)
MAGNESIUM SERPL-MCNC: 1.9 MG/DL (ref 1.6–2.4)
MCH RBC QN AUTO: 31.4 PG (ref 26–34)
MCHC RBC AUTO-ENTMCNC: 33.5 G/DL (ref 30–36.5)
MCV RBC AUTO: 93.8 FL (ref 80–99)
MONOCYTES # BLD: 0.8 K/UL (ref 0–1)
MONOCYTES NFR BLD: 21 % (ref 5–13)
NEUTS SEG # BLD: 1.8 K/UL (ref 1.8–8)
NEUTS SEG NFR BLD: 50 % (ref 32–75)
NITRITE UR QL STRIP.AUTO: NEGATIVE
NRBC # BLD: 0 K/UL (ref 0–0.01)
NRBC BLD-RTO: 0 PER 100 WBC
PH UR STRIP: 5 [PH] (ref 5–8)
PLATELET # BLD AUTO: 194 K/UL (ref 150–400)
PMV BLD AUTO: 10.7 FL (ref 8.9–12.9)
POTASSIUM SERPL-SCNC: 4.6 MMOL/L (ref 3.5–5.1)
PROT SERPL-MCNC: 8.2 G/DL (ref 6.4–8.2)
PROT UR STRIP-MCNC: 100 MG/DL
RBC # BLD AUTO: 4.36 M/UL (ref 4.1–5.7)
RBC #/AREA URNS HPF: ABNORMAL /HPF (ref 0–5)
RBC MORPH BLD: ABNORMAL
SODIUM SERPL-SCNC: 128 MMOL/L (ref 136–145)
SOURCE, COVRS: ABNORMAL
SP GR UR REFRACTOMETRY: 1.02
UA: UC IF INDICATED,UAUC: ABNORMAL
UROBILINOGEN UR QL STRIP.AUTO: 0.2 EU/DL (ref 0.2–1)
WBC # BLD AUTO: 3.6 K/UL (ref 4.1–11.1)
WBC URNS QL MICRO: ABNORMAL /HPF (ref 0–4)

## 2022-06-24 PROCEDURE — 71046 X-RAY EXAM CHEST 2 VIEWS: CPT

## 2022-06-24 PROCEDURE — 74011250636 HC RX REV CODE- 250/636: Performed by: STUDENT IN AN ORGANIZED HEALTH CARE EDUCATION/TRAINING PROGRAM

## 2022-06-24 PROCEDURE — 36415 COLL VENOUS BLD VENIPUNCTURE: CPT

## 2022-06-24 PROCEDURE — 82550 ASSAY OF CK (CPK): CPT

## 2022-06-24 PROCEDURE — 96360 HYDRATION IV INFUSION INIT: CPT

## 2022-06-24 PROCEDURE — 85025 COMPLETE CBC W/AUTO DIFF WBC: CPT

## 2022-06-24 PROCEDURE — 87635 SARS-COV-2 COVID-19 AMP PRB: CPT

## 2022-06-24 PROCEDURE — 80053 COMPREHEN METABOLIC PANEL: CPT

## 2022-06-24 PROCEDURE — 96361 HYDRATE IV INFUSION ADD-ON: CPT

## 2022-06-24 PROCEDURE — 81001 URINALYSIS AUTO W/SCOPE: CPT

## 2022-06-24 PROCEDURE — 99284 EMERGENCY DEPT VISIT MOD MDM: CPT

## 2022-06-24 PROCEDURE — 83735 ASSAY OF MAGNESIUM: CPT

## 2022-06-24 RX ADMIN — SODIUM CHLORIDE 1000 ML: 9 INJECTION, SOLUTION INTRAVENOUS at 16:10

## 2022-06-24 NOTE — DISCHARGE INSTRUCTIONS
Follow up with your PCP within 3-5 day for repeat evaluation. You will need repeat blood work in near future to ensure normalization of kidney function and your sodium levels.

## 2022-06-24 NOTE — ED PROVIDER NOTES
EMERGENCY DEPARTMENT HISTORY AND PHYSICAL EXAM      Date: 6/24/2022  Patient Name: Ac Watson    History of Presenting Illness     Chief Complaint   Patient presents with   [employer] Fall     pt ambulatory into triage with cc of fall yesterday while wlaking to the mailbox, experienced parasthesia in both thighs which has resolved, has cardiac hx. generalized fatigue    Fatigue       History Provided By: Patient and wife    HPI: Ac Watson, 80 y.o. male with past medical history of A. fib, hypertension, hyperlipidemia, hypothyroidism, presents to the ED with cc of generalized weakness. Patient reports he has been feeling extremely fatigued over the past few days. Reports yesterday he had a sudden episode of weakness numbness in bilateral thighs, which led to both legs giving out on him, causing him to fall. He denies any injury from this fall. States that he had some difficulty getting up after the fall due to acute onset of profound weakness in bilateral lower extremities which he has never had in the past.  States this episode lasted for less than 15 minutes, prior to symptoms improving and he was able to get himself up and ambulate normally again. Patient denies any associated low back pain at the time, no symptoms or signs concerning for cauda equina or conus medullaris such as saddle anesthesia, bowel or bladder dysfunction. He tells me that he does have a history of low back pain from time to time, and has been told that he has some scoliosis and degenerative changes/arthritis. Despite this history, he has never had leg weakness like he did yesterday. Today, patient was able to ambulate into the ED independently without assistance. However, he admits that he still feels overall extremely fatigued compared to his usual self.   Patient shares that he has been working out in the heat for long periods of time recently, and wife feels that he does not drink enough fluids for how long he stays out in the sun.  Patient's wife also shares that he has been experiencing intermittent coughing episodes, along with some other URI symptoms x several days. She tells me that he had a measurable temperature yesterday greater than 100.4 °F.  Patient denies any shortness of breath or chest pain. He tells me that they have had close contact with several [employer] members who recently tested positive for COVID. Their [employer] reportedly had to close down due to the number of infected individuals recently. Patient is not vaccinated for COVID-19. PCP: Rayshawn James PA-C    No current facility-administered medications on file prior to encounter. Current Outpatient Medications on File Prior to Encounter   Medication Sig Dispense Refill    lovastatin (MEVACOR) 20 mg tablet Take 1 tablet by mouth nightly 90 Tablet 0    benazepriL (LOTENSIN) 40 mg tablet TAKE 1 TABLET BY MOUTH ONCE DAILY  **APPOINTMENT REQUIRED FOR FURTHER REFILLS** 90 Tablet 0    allopurinoL (ZYLOPRIM) 300 mg tablet Take 1 tablet by mouth once daily 90 Tablet 0    amLODIPine (NORVASC) 10 mg tablet Take 1 tablet by mouth once daily 90 Tablet 0    levothyroxine (SYNTHROID) 75 mcg tablet TAKE 1 TABLET BY MOUTH ONCE DAILY BEFORE BREAKFAST FOR THYROID 90 Tablet 0    hydroCHLOROthiazide (MICROZIDE) 12.5 mg capsule Take 1 capsule by mouth once daily 90 Capsule 0    flecainide (TAMBOCOR) 100 mg tablet Take 100 mg by mouth every twelve (12) hours.  metoprolol tartrate (LOPRESSOR) 50 mg tablet 25 mg daily.  aspirin delayed-release 81 mg tablet Take 1 Tab by mouth daily.  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       Past Surgical History:  Past Surgical History:   Procedure Laterality Date    HX CATARACT REMOVAL Right 2016    HX HEENT      nose polyps    HX ORTHOPAEDIC      L elbow surgery    HX PACEMAKER PLACEMENT N/A 2022       Family History:  Family History   Problem Relation Age of Onset    Hypertension Mother     Stroke Mother     Heart Disease Brother         late 61    Cancer Brother         Lung    Stroke Father     Cancer Sister         Lung       Social History:  Social History     Tobacco Use    Smoking status: Former Smoker     Packs/day: 1.00     Years: 16.00     Pack years: 16.00     Quit date: 3/12/1988     Years since quittin.3    Smokeless tobacco: Never Used   Vaping Use    Vaping Use: Never used   Substance Use Topics    Alcohol use: No    Drug use: Not Currently     Types: Prescription, Marijuana     Comment: \"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 Negative for chills. HENT: Positive for congestion. Negative for rhinorrhea. Eyes: Negative for visual disturbance. Respiratory: Positive for cough. Negative for chest tightness and shortness of breath. Cardiovascular: Negative for chest pain, palpitations and leg swelling. Gastrointestinal: Negative for abdominal pain, diarrhea, nausea and vomiting. Genitourinary: Negative for dysuria, flank pain and hematuria. Musculoskeletal: Negative for back pain and neck pain. Skin: Negative for rash. Allergic/Immunologic: Negative for immunocompromised state. Neurological: Negative for dizziness, speech difficulty, weakness and headaches. Hematological: Negative for adenopathy. Psychiatric/Behavioral: Negative for dysphoric mood and suicidal ideas. Physical Exam   Physical Exam  Vitals and nursing note reviewed.    Constitutional: General: He is not in acute distress. Appearance: Normal appearance. He is not ill-appearing or toxic-appearing. HENT:      Head: Normocephalic and atraumatic. Nose: Nose normal.      Mouth/Throat:      Mouth: Mucous membranes are moist.   Eyes:      Extraocular Movements: Extraocular movements intact. Pupils: Pupils are equal, round, and reactive to light. Cardiovascular:      Rate and Rhythm: Normal rate and regular rhythm. Pulses: Normal pulses. Posterior tibial pulses are 2+ on the right side and 2+ on the left side. Pulmonary:      Effort: Pulmonary effort is normal. No respiratory distress. Breath sounds: Normal breath sounds. No stridor. No wheezing or rhonchi. Comments: Intermittent dry cough noted  Abdominal:      General: Abdomen is flat. There is no distension. Palpations: Abdomen is soft. There is no mass. Tenderness: There is no abdominal tenderness. Musculoskeletal:         General: Normal range of motion. Cervical back: Normal range of motion and neck supple. Lumbar back: No deformity, signs of trauma, spasms, tenderness or bony tenderness. Normal range of motion. Negative right straight leg raise test and negative left straight leg raise test.      Right lower leg: No edema. Left lower leg: No edema. Skin:     General: Skin is warm and dry. Neurological:      General: No focal deficit present. Mental Status: He is alert. Mental status is at baseline. Cranial Nerves: Cranial nerves are intact. Sensory: Sensation is intact. No sensory deficit. Motor: Motor function is intact. No weakness or pronator drift. Coordination: Coordination is intact. Gait: Gait is intact.  Gait normal.         Diagnostic Study Results     Labs -     Recent Results (from the past 24 hour(s))   CBC WITH AUTOMATED DIFF    Collection Time: 06/24/22  1:01 PM   Result Value Ref Range    WBC 3.6 (L) 4.1 - 11.1 K/uL    RBC 4.36 4.10 - 5.70 M/uL    HGB 13.7 12.1 - 17.0 g/dL    HCT 40.9 36.6 - 50.3 %    MCV 93.8 80.0 - 99.0 FL    MCH 31.4 26.0 - 34.0 PG    MCHC 33.5 30.0 - 36.5 g/dL    RDW 14.6 (H) 11.5 - 14.5 %    PLATELET 792 000 - 605 K/uL    MPV 10.7 8.9 - 12.9 FL    NRBC 0.0 0  WBC    ABSOLUTE NRBC 0.00 0.00 - 0.01 K/uL    NEUTROPHILS 50 32 - 75 %    LYMPHOCYTES 25 12 - 49 %    MONOCYTES 21 (H) 5 - 13 %    EOSINOPHILS 2 0 - 7 %    BASOPHILS 1 0 - 1 %    IMMATURE GRANULOCYTES 1 (H) 0.0 - 0.5 %    ABS. NEUTROPHILS 1.8 1.8 - 8.0 K/UL    ABS. LYMPHOCYTES 0.9 0.8 - 3.5 K/UL    ABS. MONOCYTES 0.8 0.0 - 1.0 K/UL    ABS. EOSINOPHILS 0.1 0.0 - 0.4 K/UL    ABS. BASOPHILS 0.0 0.0 - 0.1 K/UL    ABS. IMM. GRANS. 0.0 0.00 - 0.04 K/UL    DF SMEAR SCANNED      RBC COMMENTS NORMOCYTIC, NORMOCHROMIC     METABOLIC PANEL, COMPREHENSIVE    Collection Time: 06/24/22  1:01 PM   Result Value Ref Range    Sodium 128 (L) 136 - 145 mmol/L    Potassium 4.6 3.5 - 5.1 mmol/L    Chloride 98 97 - 108 mmol/L    CO2 26 21 - 32 mmol/L    Anion gap 4 (L) 5 - 15 mmol/L    Glucose 96 65 - 100 mg/dL    BUN 27 (H) 6 - 20 MG/DL    Creatinine 2.06 (H) 0.70 - 1.30 MG/DL    BUN/Creatinine ratio 13 12 - 20      GFR est AA 38 (L) >60 ml/min/1.73m2    GFR est non-AA 31 (L) >60 ml/min/1.73m2    Calcium 8.9 8.5 - 10.1 MG/DL    Bilirubin, total 0.2 0.2 - 1.0 MG/DL    ALT (SGPT) 27 12 - 78 U/L    AST (SGOT) 40 (H) 15 - 37 U/L    Alk.  phosphatase 71 45 - 117 U/L    Protein, total 8.2 6.4 - 8.2 g/dL    Albumin 3.4 (L) 3.5 - 5.0 g/dL    Globulin 4.8 (H) 2.0 - 4.0 g/dL    A-G Ratio 0.7 (L) 1.1 - 2.2     MAGNESIUM    Collection Time: 06/24/22  1:01 PM   Result Value Ref Range    Magnesium 1.9 1.6 - 2.4 mg/dL   CK    Collection Time: 06/24/22  1:01 PM   Result Value Ref Range     39 - 308 U/L   URINALYSIS W/ REFLEX CULTURE    Collection Time: 06/24/22  4:08 PM    Specimen: Urine   Result Value Ref Range    Color DARK YELLOW      Appearance CLEAR CLEAR      Specific gravity 1.018 pH (UA) 5.0 5.0 - 8.0      Protein 100 (A) NEG mg/dL    Glucose Negative NEG mg/dL    Ketone Negative NEG mg/dL    Bilirubin Negative NEG      Blood Negative NEG      Urobilinogen 0.2 0.2 - 1.0 EU/dL    Nitrites Negative NEG      Leukocyte Esterase Negative NEG      WBC 0-4 0 - 4 /hpf    RBC 0-5 0 - 5 /hpf    Epithelial cells FEW FEW /lpf    Bacteria 1+ (A) NEG /hpf    UA:UC IF INDICATED CULTURE NOT INDICATED BY UA RESULT CNI      Hyaline cast 2-5 0 - 5 /lpf   COVID-19 RAPID TEST    Collection Time: 06/24/22  4:08 PM   Result Value Ref Range    Specimen source Nasopharyngeal      COVID-19 rapid test Detected (A) NOTD         Radiologic Studies -   XR CHEST PA LAT   Final Result   1. No acute cardiopulmonary disease           CT Results  (Last 48 hours)    None        CXR Results  (Last 48 hours)               06/24/22 1545  XR CHEST PA LAT Final result    Impression:  1. No acute cardiopulmonary disease           Narrative:  INDICATION:  cough        Exam: Chest 2 views. Comparison: 1/6/2022. Findings: Cardiomediastinal silhouette is normal. Pacer is unchanged. Pulmonary   vasculature is not engorged. No focal parenchymal opacities, effusions, or   pneumothorax. Bony thorax is intact. Medical Decision Making   I, Brandon Solis MD am the first provider for this patient, and I am the attending of record for this patient encounter. I reviewed the vital signs, available nursing notes, past medical history, past surgical history, family history and social history. Vital Signs-Reviewed the patient's vital signs. Patient Vitals for the past 24 hrs:   Temp Pulse Resp BP SpO2   06/24/22 1743 98.3 °F (36.8 °C) 64 18 (!) 140/62 97 %   06/24/22 1154 97.8 °F (36.6 °C) 80 18 120/70 98 %     Records Reviewed: Nursing Notes and Old Medical Records    Provider Notes (Medical Decision Making): In the ED, patient has stable vital signs. He is well-appearing, nontoxic, afebrile.   He does have a noticeable dry cough intermittently. With his history of close contact with several [employer] members, will need to rule out for COVID-19 infection. This can certainly be contributing to his current presentation. We will check rapid COVID swab, along with chest x-ray to assess for bacterial infection. Other differentials considered include: Spinal stenosis with radiculopathy/myelopathy (doubt conus medullaris/cauda equina based on reassuring exam and lack of red flag back pain symptoms. No indication for spine imaging as no preceding trauma either), rhabdomyolysis, dehydration, heatstroke versus heat exhaustion, orthostasis,  deconditioning, etc. No concern for CVA or TIA for transient episode of bilateral symmetric lower extremity weakness and numbness as symptoms do not fit that expected of CNS lesion/infarct. We will also check basic blood work including CBC, CMP, magnesium, CK, UA. Work-up is remarkable for mild hyponatremia of 128, likely related to vascular depletion from dehydration. He has evidence of RANDAL on CKD with creatinine of 2.06, BUN of 27. Baseline creatinine is between 1.3-1.6. CPK is normal.  UA is negative for infection. Rapid COVID swab is positive. Chest x-ray is negative for acute process. Patient is treated with 1 L of normal saline in the ED to help with his RANDAL as well as hyponatremia. He felt much better after this. Results of today's work-up discussed with the patient. Advised continued aggressive p.o. hydration at home, preferably with electrolyte rich fluids such as Pedialyte. He may require outpatient MRI lumbar spine studies in the future on a nonemergent basis should he have recurrence of bilateral lower extremity weakness or numbness, which can be ordered by his PCP to assess for potential entrapped nerve as well as potential EMG studies by neurology. He verbalized understanding and agreement.   Advised the patient to monitor for signs or symptoms of severe COVID which may warrant immediate return to the ED, including worsening shortness of breath associated with persistent hypoxia, with SPO2 less than 92% at rest.  He verbalized understanding and agreement. Otherwise, patient is instructed to follow-up with PCP within 3 to 5 days for repeat evaluation. He would benefit from repeat blood work in the near future to ensure normalization of renal function as well as hyponatremia. Patient and wife felt comfortable with plan as outlined above. Discharged in stable condition. ED Course:   Initial assessment performed. The patient's presenting problems have been discussed, and they are in agreement with the care plan formulated and outlined with them. I have encouraged them to ask questions as they arise throughout their visit. Gladis Burton MD      Disposition:  DC      DISCHARGE PLAN:  1. Discharge Medication List as of 6/24/2022  5:36 PM        2. Follow-up Information     Follow up With Specialties Details Why Contact Info    Leon Bishop PA-C Physician Assistant In 3 days  [address] [facility] [id number] [phone]      [facility] EMERGENCY DEPT Emergency Medicine  If symptoms worsen [address]  [address] [facility]  [phone]        3. Return to ED if worse     Diagnosis     Clinical Impression:   1. COVID-19 virus infection    2. RANDAL (acute kidney injury) ([facility] [address]    3. Hyponatremia    4. Complaints of weakness of lower extremity    5. Fall, initial encounter        Attestations:    Gladis Burton MD    Please note that this dictation was completed with [employer], the computer voice recognition software. Quite often unanticipated grammatical, syntax, homophones, and other interpretive errors are inadvertently transcribed by the computer software. Please disregard these errors. Please excuse any errors that have escaped final proofreading. Thank you.

## 2022-07-01 RX ORDER — BENAZEPRIL HYDROCHLORIDE 40 MG/1
TABLET ORAL
Qty: 90 TABLET | Refills: 0 | Status: SHIPPED | OUTPATIENT
Start: 2022-07-01

## 2022-07-01 NOTE — TELEPHONE ENCOUNTER
PCP: Stacy Rojas PA-C     Last appt: 2/8/2022   Future Appointments   Date Time Provider [place]   8/16/2022  1:00 PM Stacy Rojas PA-C [facility]        Requested Prescriptions     Pending Prescriptions Disp Refills    benazepriL (LOTENSIN) 40 mg tablet 90 Tablet 0     Sig: TAKE 1 TABLET BY MOUTH ONCE DAILY  **APPOINTMENT REQUIRED FOR FURTHER REFILLS**

## 2022-07-01 NOTE — TELEPHONE ENCOUNTER
----- Message from Dmitriy Lundberg sent at 7/1/2022  2:01 PM EDT -----  Subject: Refill Request    QUESTIONS  Name of Medication? benazepriL (LOTENSIN) 40 mg tablet  Patient-reported dosage and instructions? 40 MG 1 x daily  How many days do you have left? 5  Preferred Pharmacy? [id number]   Pharmacy phone number (if available)? [phone]  Additional Information for Provider? Patient has an appointment booked for   8/16/22  ---------------------------------------------------------------------------  --------------  CALL BACK INFO  What is the best way for the office to contact you? OK to leave message on   voicemail  Preferred Call Back Phone Number? [phone]  ---------------------------------------------------------------------------  --------------  SCRIPT ANSWERS  Relationship to Patient? Other  Representative Name? Hermilo Gomez permission  Is the Representative on the appropriate HIPAA document in Epic?  Yes

## 2022-07-04 RX ORDER — HYDROCHLOROTHIAZIDE 12.5 MG/1
CAPSULE ORAL
Qty: 90 CAPSULE | Refills: 0 | Status: SHIPPED | OUTPATIENT
Start: 2022-07-04 | End: 2022-09-26

## 2022-07-10 RX ORDER — LEVOTHYROXINE SODIUM 75 UG/1
TABLET ORAL
Qty: 90 TABLET | Refills: 0 | Status: SHIPPED | OUTPATIENT
Start: 2022-07-10 | End: 2022-10-10

## 2022-07-17 RX ORDER — ALLOPURINOL 300 MG/1
TABLET ORAL
Qty: 90 TABLET | Refills: 0 | Status: SHIPPED | OUTPATIENT
Start: 2022-07-17 | End: 2022-10-10

## 2022-07-23 DIAGNOSIS — E78.00 HYPERCHOLESTEROLEMIA: ICD-10-CM

## 2022-07-24 RX ORDER — LOVASTATIN 20 MG/1
TABLET ORAL
Qty: 90 TABLET | Refills: 0 | Status: SHIPPED | OUTPATIENT
Start: 2022-07-24 | End: 2022-10-30

## 2022-07-26 ENCOUNTER — TELEPHONE (OUTPATIENT)
Dept: INTERNAL MEDICINE CLINIC | Age: 82
End: 2022-07-26

## 2022-07-26 DIAGNOSIS — E78.00 HYPERCHOLESTEROLEMIA: ICD-10-CM

## 2022-07-26 DIAGNOSIS — R73.02 GLUCOSE INTOLERANCE (IMPAIRED GLUCOSE TOLERANCE): Primary | ICD-10-CM

## 2022-07-26 NOTE — TELEPHONE ENCOUNTER
----- Message from [facility] sent at 7/26/2022  9:36 AM EDT -----  Subject: Message to Provider    QUESTIONS  Information for Provider? Urgent -pt wants orders to be sent home for his   blood work. please contact this pt and his wife.   ---------------------------------------------------------------------------  --------------  [place]  [phone]; OK to leave message on voicemail  ---------------------------------------------------------------------------  --------------  SCRIPT ANSWERS  Relationship to Patient?  Self

## 2022-08-07 RX ORDER — AMLODIPINE BESYLATE 10 MG/1
TABLET ORAL
Qty: 90 TABLET | Refills: 0 | Status: SHIPPED | OUTPATIENT
Start: 2022-08-07

## 2022-08-17 LAB
ALBUMIN SERPL-MCNC: 4 G/DL (ref 3.6–4.6)
ALBUMIN/GLOB SERPL: 1.3 {RATIO} (ref 1.2–2.2)
ALP SERPL-CCNC: 77 IU/L (ref 44–121)
ALT SERPL-CCNC: 13 IU/L (ref 0–44)
AST SERPL-CCNC: 17 IU/L (ref 0–40)
BASOPHILS # BLD AUTO: 0.1 X10E3/UL (ref 0–0.2)
BASOPHILS NFR BLD AUTO: 1 %
BILIRUB SERPL-MCNC: 0.3 MG/DL (ref 0–1.2)
BUN SERPL-MCNC: 22 MG/DL (ref 8–27)
BUN/CREAT SERPL: 15 (ref 10–24)
CALCIUM SERPL-MCNC: 9.6 MG/DL (ref 8.6–10.2)
CHLORIDE SERPL-SCNC: 102 MMOL/L (ref 96–106)
CHOLEST SERPL-MCNC: 142 MG/DL (ref 100–199)
CO2 SERPL-SCNC: 20 MMOL/L (ref 20–29)
CREAT SERPL-MCNC: 1.42 MG/DL (ref 0.76–1.27)
EGFR: 49 ML/MIN/1.73
EOSINOPHIL # BLD AUTO: 0.7 X10E3/UL (ref 0–0.4)
EOSINOPHIL NFR BLD AUTO: 11 %
ERYTHROCYTE [DISTWIDTH] IN BLOOD BY AUTOMATED COUNT: 14.8 % (ref 11.6–15.4)
EST. AVERAGE GLUCOSE BLD GHB EST-MCNC: 126 MG/DL
GLOBULIN SER CALC-MCNC: 3.2 G/DL (ref 1.5–4.5)
GLUCOSE SERPL-MCNC: 99 MG/DL (ref 65–99)
HBA1C MFR BLD: 6 % (ref 4.8–5.6)
HCT VFR BLD AUTO: 39.2 % (ref 37.5–51)
HDLC SERPL-MCNC: 37 MG/DL
HGB BLD-MCNC: 13 G/DL (ref 13–17.7)
IMM GRANULOCYTES # BLD AUTO: 0 X10E3/UL (ref 0–0.1)
IMM GRANULOCYTES NFR BLD AUTO: 0 %
LDLC SERPL CALC-MCNC: 83 MG/DL (ref 0–99)
LYMPHOCYTES # BLD AUTO: 1.4 X10E3/UL (ref 0.7–3.1)
LYMPHOCYTES NFR BLD AUTO: 21 %
MCH RBC QN AUTO: 31.3 PG (ref 26.6–33)
MCHC RBC AUTO-ENTMCNC: 33.2 G/DL (ref 31.5–35.7)
MCV RBC AUTO: 94 FL (ref 79–97)
MONOCYTES # BLD AUTO: 0.7 X10E3/UL (ref 0.1–0.9)
MONOCYTES NFR BLD AUTO: 10 %
NEUTROPHILS # BLD AUTO: 3.7 X10E3/UL (ref 1.4–7)
NEUTROPHILS NFR BLD AUTO: 57 %
PLATELET # BLD AUTO: 224 X10E3/UL (ref 150–450)
POTASSIUM SERPL-SCNC: 4.4 MMOL/L (ref 3.5–5.2)
PROT SERPL-MCNC: 7.2 G/DL (ref 6–8.5)
RBC # BLD AUTO: 4.16 X10E6/UL (ref 4.14–5.8)
SODIUM SERPL-SCNC: 138 MMOL/L (ref 134–144)
TRIGL SERPL-MCNC: 120 MG/DL (ref 0–149)
VLDLC SERPL CALC-MCNC: 22 MG/DL (ref 5–40)
WBC # BLD AUTO: 6.6 X10E3/UL (ref 3.4–10.8)

## 2022-08-17 NOTE — PROGRESS NOTES
Charley Milton Birthday. Labs look fantastic. Kidney function is at goal, A1C better than last check. Cholesterol levels look good.    See you in September

## 2022-09-26 ENCOUNTER — OFFICE VISIT (OUTPATIENT)
Dept: INTERNAL MEDICINE CLINIC | Age: 82
End: 2022-09-26
Payer: MEDICARE

## 2022-09-26 VITALS
HEIGHT: 70 IN | SYSTOLIC BLOOD PRESSURE: 128 MMHG | RESPIRATION RATE: 12 BRPM | BODY MASS INDEX: 25.05 KG/M2 | HEART RATE: 77 BPM | DIASTOLIC BLOOD PRESSURE: 70 MMHG | WEIGHT: 175 LBS | TEMPERATURE: 97.8 F | OXYGEN SATURATION: 95 %

## 2022-09-26 DIAGNOSIS — R73.02 GLUCOSE INTOLERANCE (IMPAIRED GLUCOSE TOLERANCE): ICD-10-CM

## 2022-09-26 DIAGNOSIS — I49.5 SSS (SICK SINUS SYNDROME) (HCC): ICD-10-CM

## 2022-09-26 DIAGNOSIS — E78.00 HYPERCHOLESTEROLEMIA: ICD-10-CM

## 2022-09-26 DIAGNOSIS — M19.011 PRIMARY OSTEOARTHRITIS OF RIGHT SHOULDER: ICD-10-CM

## 2022-09-26 DIAGNOSIS — I10 ESSENTIAL HYPERTENSION: ICD-10-CM

## 2022-09-26 DIAGNOSIS — Z95.0 PACEMAKER: ICD-10-CM

## 2022-09-26 DIAGNOSIS — N18.32 STAGE 3B CHRONIC KIDNEY DISEASE (HCC): ICD-10-CM

## 2022-09-26 DIAGNOSIS — E03.4 HYPOTHYROIDISM DUE TO ACQUIRED ATROPHY OF THYROID: ICD-10-CM

## 2022-09-26 DIAGNOSIS — G89.29 CHRONIC RIGHT SHOULDER PAIN: ICD-10-CM

## 2022-09-26 DIAGNOSIS — I48.20 CHRONIC ATRIAL FIBRILLATION (HCC): ICD-10-CM

## 2022-09-26 DIAGNOSIS — M25.511 CHRONIC RIGHT SHOULDER PAIN: ICD-10-CM

## 2022-09-26 DIAGNOSIS — Z00.00 MEDICARE ANNUAL WELLNESS VISIT, SUBSEQUENT: Primary | ICD-10-CM

## 2022-09-26 PROCEDURE — G0439 PPPS, SUBSEQ VISIT: HCPCS | Performed by: PHYSICIAN ASSISTANT

## 2022-09-26 PROCEDURE — G8754 DIAS BP LESS 90: HCPCS | Performed by: PHYSICIAN ASSISTANT

## 2022-09-26 PROCEDURE — 1101F PT FALLS ASSESS-DOCD LE1/YR: CPT | Performed by: PHYSICIAN ASSISTANT

## 2022-09-26 PROCEDURE — 99214 OFFICE O/P EST MOD 30 MIN: CPT | Performed by: PHYSICIAN ASSISTANT

## 2022-09-26 PROCEDURE — G8417 CALC BMI ABV UP PARAM F/U: HCPCS | Performed by: PHYSICIAN ASSISTANT

## 2022-09-26 PROCEDURE — 1123F ACP DISCUSS/DSCN MKR DOCD: CPT | Performed by: PHYSICIAN ASSISTANT

## 2022-09-26 PROCEDURE — G8432 DEP SCR NOT DOC, RNG: HCPCS | Performed by: PHYSICIAN ASSISTANT

## 2022-09-26 PROCEDURE — G8427 DOCREV CUR MEDS BY ELIG CLIN: HCPCS | Performed by: PHYSICIAN ASSISTANT

## 2022-09-26 PROCEDURE — G8536 NO DOC ELDER MAL SCRN: HCPCS | Performed by: PHYSICIAN ASSISTANT

## 2022-09-26 PROCEDURE — G8752 SYS BP LESS 140: HCPCS | Performed by: PHYSICIAN ASSISTANT

## 2022-09-26 RX ORDER — TRAMADOL HYDROCHLORIDE 50 MG/1
50 TABLET ORAL
Qty: 21 TABLET | Refills: 0 | Status: SHIPPED | OUTPATIENT
Start: 2022-09-26 | End: 2022-10-04

## 2022-09-26 NOTE — PATIENT INSTRUCTIONS
Medicare Wellness Visit, Male    The best way to live healthy is to have a lifestyle where you eat a well-balanced diet, exercise regularly, limit alcohol use, and quit all forms of tobacco/nicotine, if applicable. Regular preventive services are another way to keep healthy. Preventive services (vaccines, screening tests, monitoring & exams) can help personalize your care plan, which helps you manage your own care. Screening tests can find health problems at the earliest stages, when they are easiest to treat. [facility] follows the current, evidence-based guidelines published by the [facility] ([facility]) when recommending preventive services for our patients. Because we follow these guidelines, sometimes recommendations change over time as research supports it. (For example, a prostate screening blood test is no longer routinely recommended for men with no symptoms). Of course, you and your doctor may decide to screen more often for some diseases, based on your risk and co-morbidities (chronic disease you are already diagnosed with). Preventive services for you include:  - Medicare offers their members a free annual wellness visit, which is time for you and your primary care provider to discuss and plan for your preventive service needs. Take advantage of this benefit every year!  -All adults over age 72 should receive the recommended pneumonia vaccines. Current USPSTF guidelines recommend a series of two vaccines for the best pneumonia protection.   -All adults should have a flu vaccine yearly and tetanus vaccine every 10 years.  -All adults age 48 and older should receive the shingles vaccines (series of two vaccines).        -All adults age 38-68 who are overweight should have a diabetes screening test once every three years.   -Other screening tests & preventive services for persons with diabetes include: an eye exam to screen for diabetic retinopathy, a kidney function test, a foot exam, and stricter control over your cholesterol.   -Cardiovascular screening for adults with routine risk involves an electrocardiogram (ECG) at intervals determined by the provider.   -Colorectal cancer screening should be done for adults age 54-65 with no increased risk factors for colorectal cancer. There are a number of acceptable methods of screening for this type of cancer. Each test has its own benefits and drawbacks. Discuss with your provider what is most appropriate for you during your annual wellness visit. The different tests include: colonoscopy (considered the best screening method), a fecal occult blood test, a fecal DNA test, and sigmoidoscopy.  -All adults born between [facility] should be screened once for Hepatitis C.  -An Abdominal Aortic Aneurysm (AAA) Screening is recommended for men age 73-68 who has ever smoked in their lifetime.      Here is a list of your current Health Maintenance items (your personalized list of preventive services) with a due date:  Health Maintenance Due   Topic Date Due    COVID-19 Vaccine (1) Never done    Shingles Vaccine (1 of 2) Never done    Yearly Flu Vaccine (1) 08/01/2022    Annual Well Visit  08/04/2022

## 2022-09-26 NOTE — PROGRESS NOTES
Kristal Loyola  Identified pt with two pt identifiers(name and ). Chief Complaint   Patient presents with    Annual Wellness Visit    Hypertension    Cholesterol Problem    COPD    Shoulder Pain       Reviewed record In preparation for visit and have obtained necessary documentation. 1. Have you been to the ER, urgent care clinic or hospitalized since your last visit? No     2. Have you seen or consulted any other health care providers outside of the [address] [place] [address] since your last visit? Include any pap smears or colon screening. No    Vitals reviewed with provider. Health Maintenance reviewed:     Health Maintenance Due   Topic    COVID-19 Vaccine (1)    Flu Vaccine (1)    Medicare Yearly Exam           Wt Readings from Last 3 Encounters:   22 175 lb (79.4 kg)   22 180 lb (81.6 kg)   22 180 lb (81.6 kg)        Temp Readings from Last 3 Encounters:   22 97.8 °F (36.6 °C) (Oral)   22 98.3 °F (36.8 °C)   22 97.8 °F (36.6 °C) (Oral)        BP Readings from Last 3 Encounters:   22 (!) 152/78   22 (!) 140/62   22 134/73        Pulse Readings from Last 3 Encounters:   22 77   22 64   22 64        Vitals:    22 1512   BP: (!) 152/78   Pulse: 77   Resp: 12   Temp: 97.8 °F (36.6 °C)   TempSrc: Oral   SpO2: 95%   Weight: 175 lb (79.4 kg)   Height: 5' 10\" (1.778 m)   PainSc:   7   PainLoc: Shoulder          Learning Assessment:   :       Learning Assessment 2014   PRIMARY LEARNER Patient   PRIMARY LANGUAGE ENGLISH   LEARNER PREFERENCE PRIMARY DEMONSTRATION   ANSWERED BY patient   RELATIONSHIP SELF        Depression Screening:   :       3 most recent PHQ Screens 2022   Little interest or pleasure in doing things Not at all   Feeling down, depressed, irritable, or hopeless Not at all   Total Score PHQ 2 0        Fall Risk Assessment:   :       Fall Risk Assessment, last 12 mths 2022   Able to walk?  Yes   Fall in past 12 months? 1   Do you feel unsteady? 0   Are you worried about falling 0   Number of falls in past 12 months 1   Fall with injury? 1        Abuse Screening:   :       Abuse Screening Questionnaire 9/26/2022 8/3/2021 4/7/2020 8/14/2019 2/2/2018 7/5/2016 11/13/2014   Do you ever feel afraid of your partner? N N N N N N N   Are you in a relationship with someone who physically or mentally threatens you? N N N N N N N   Is it safe for you to go home?  Y Y Y Y Y Y Y        ADL Screening:   :       ADL Assessment 9/26/2022   Feeding yourself No Help Needed   Getting from bed to chair No Help Needed   Getting dressed No Help Needed   Bathing or showering No Help Needed   Walk across the room (includes cane/walker) No Help Needed   Using the telphone No Help Needed   Taking your medications No Help Needed   Preparing meals No Help Needed   Managing money (expenses/bills) No Help Needed   Moderately strenuous housework (laundry) No Help Needed   Shopping for personal items (toiletries/medicines) No Help Needed   Shopping for groceries No Help Needed   Driving No Help Needed   Climbing a flight of stairs No Help Needed   Getting to places beyond walking distances No Help Needed

## 2022-09-26 NOTE — PROGRESS NOTES
This is the Subsequent Medicare Annual Wellness Exam, performed 12 months or more after the Initial AWV or the last Subsequent AWV    I have reviewed the patient's medical history in detail and updated the computerized patient record. Assessment/Plan   Education and counseling provided:  Are appropriate based on today's review and evaluation  End-of-Life planning (with patient's consent)    1. Medicare annual wellness visit, subsequent  2. SSS (sick sinus syndrome) ([facility]  3. Pacemaker  4. Chronic atrial fibrillation (HCC)  5. Essential hypertension  6. Hypothyroidism due to acquired atrophy of thyroid  7. Glucose intolerance (impaired glucose tolerance)  8. Stage 3b chronic kidney disease ([facility] [address]  9. Hypercholesterolemia       Depression Risk Factor Screening     3 most recent PHQ Screens 2/8/2022   Little interest or pleasure in doing things Not at all   Feeling down, depressed, irritable, or hopeless Not at all   Total Score PHQ 2 0       Alcohol & Drug Abuse Risk Screen    Do you average more than 1 drink per night or more than 7 drinks a week: No    In the past three months have you have had more than 4 drinks containing alcohol on one occasion: No          Functional Ability and Level of Safety    Hearing: The patient wears hearing aids. Activities of Daily Living: The home contains: handrails and grab bars  Patient does total self care      Ambulation: with no difficulty     Fall Risk:  Fall Risk Assessment, last 12 mths 2/8/2022   Able to walk? Yes   Fall in past 12 months? 0   Do you feel unsteady?  1   Are you worried about falling -      Abuse Screen:  Patient is not abused       Cognitive Screening    Has your family/caregiver stated any concerns about your memory: no     Cognitive Screening: Normal - Mini Cog Test, Verbal Fluency Test    Health Maintenance Due     Health Maintenance Due   Topic Date Due    COVID-19 Vaccine (1) Never done    Shingrix Vaccine Age 50> (1 of 2) Never done    Flu Vaccine (1) 08/01/2022    Medicare Yearly Exam  08/04/2022       Patient Care Team   Patient Care Team:  Sharri Ny PA-C as PCP - General (Physician Assistant)  Sharri Ny PA-C as PCP - [facility] Provider  Liliana Rincon MD (Cardiovascular Disease Physician)  Emily Szymanski MD as Consulting Provider (Endocrinology Physician)  Emily Szymanski MD as Consulting Provider (Endocrinology Physician)    History     Patient Active Problem List   Diagnosis Code    Atrial fibrillation, currently in sinus rhythm Z86.79    Hypercholesterolemia E78.00    Nephrolithiasis N20.0    Gout M10.9    Glucose intolerance (impaired glucose tolerance) R73.02    Abnormal LFTs R79.89    Essential hypertension I10    Hypothyroidism due to acquired atrophy of thyroid E03.4    Advance directive discussed with patient Z71.89    Combined forms of 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 eye H25.811    Combined forms of age-related cataract of left eye H25.812    Stage 3b chronic kidney disease ([address] N18.32    SSS (sick sinus syndrome) ([address] I49.5    Chronic atrial fibrillation ([address] I48.20    Pacemaker Z95.0     Past Medical History:   Diagnosis Date    Afib ([address]     Arthritis     L hand and bilat knees    Glucose intolerance (pre-diabetes)     Gout     Hypercholesterolemia     Hypertension     Ill-defined condition     gout    Nephrolithiasis     Thyroid disease     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      Past Surgical History:   Procedure Laterality Date    HX CATARACT REMOVAL Right 08/31/2016    HX HEENT      nose polyps    HX ORTHOPAEDIC      L elbow surgery    HX PACEMAKER PLACEMENT N/A 01/06/2022     Current Outpatient Medications   Medication Sig Dispense Refill    amLODIPine (NORVASC) 10 mg tablet Take 1 tablet by mouth once daily 90 Tablet 0    lovastatin (MEVACOR) 20 mg tablet Take 1 tablet by mouth nightly 90 Tablet 0    allopurinoL (ZYLOPRIM) 300 mg tablet Take 1 tablet by mouth once daily 90 Tablet 0    Euthyrox 75 mcg tablet TAKE 1 TABLET BY MOUTH ONCE DAILY BEFORE BREAKFAST FOR THYROID 90 Tablet 0    hydroCHLOROthiazide (MICROZIDE) 12.5 mg capsule Take 1 capsule by mouth once daily 90 Capsule 0    benazepriL (LOTENSIN) 40 mg tablet TAKE 1 TABLET BY MOUTH ONCE DAILY  **APPOINTMENT REQUIRED FOR FURTHER REFILLS** 90 Tablet 0    flecainide (TAMBOCOR) 100 mg tablet Take 100 mg by mouth every twelve (12) hours. metoprolol tartrate (LOPRESSOR) 50 mg tablet 25 mg daily. aspirin delayed-release 81 mg tablet Take 1 Tab by mouth daily. 90 Tab 3     Allergies   Allergen Reactions    Bee Sting [Sting, Bee] Anaphylaxis    Pcn [Penicillins] Swelling       Family History   Problem Relation Age of Onset    Hypertension Mother     Stroke Mother     Heart Disease Brother         late 61    Cancer Brother         Lung    Stroke Father     Cancer Sister         Lung     Social History     Tobacco Use    Smoking status: Former     Packs/day: 1.00     Years: 16.00     Pack years: 16.00     Types: Cigarettes     Quit date: 3/12/1988     Years since quittin.5    Smokeless tobacco: Never   Substance Use Topics    Alcohol use: No       Pérez Dubon is a 80y.o. year old male seen in clinic today for   Chief Complaint   Patient presents with    Annual Wellness Visit    Hypertension    Cholesterol Problem    COPD    Shoulder Pain       he is here today to follow up for HTN, HLD, COPD, IFG, hypothyroid    Pt has a hx of Hypothyroidism. They take 75  mcg of thyroid replacement. They are  compliant with their daily medications. Pt denies any temperature imbalances, hair related symptoms, bowel changes or abnormal weight gain/loss. Lab Results   Component Value Date/Time    TSH 1.020 2022 11:46 AM    TSH 1.100 2021 11:25 AM     Hypertension: Controlled with amlodipine 10 mg, metoprolol 25 mg, lotensin 40 mg  Compliant with medications? Always.  Cardiology discontinued hctz 12.5 mg after he went to ED withy dehydration and hyponatremia. Compliant with diet? Yes, no added salt  Compliant with exercise? Very active doing yardwork   Average blood pressure readings outside of office. 667-360 systolic/80. Compared with manual reading, home reading 10-15 points higher compared to hand reading     Denies any HA, dizziness, CP, SOB, edema, visual changes    Patient has has hx of HLD. Takes 20 mg lovastatin. No RUQ pain, no jaundice, no muscle aches. Hx of a-fib. Has pacemaker in place. Had ED visit in June for dehydration, passed out and fell onto shoulder on deck. Abydrea Scott again in shower. In ED showed hyponatremia and RANDAL. IVF given and he is doing much better. Shoulder still bothering him 2 months later, causing pain when he sleep at night and rolls over. XR showed DJD. he specifically denies any CP, SOB, HA. Dizziness, fevers, chills, N/V/D, urinary symptoms or other bowel changes. Current Outpatient Medications on File Prior to Visit   Medication Sig Dispense Refill    amLODIPine (NORVASC) 10 mg tablet Take 1 tablet by mouth once daily 90 Tablet 0    lovastatin (MEVACOR) 20 mg tablet Take 1 tablet by mouth nightly 90 Tablet 0    allopurinoL (ZYLOPRIM) 300 mg tablet Take 1 tablet by mouth once daily 90 Tablet 0    Euthyrox 75 mcg tablet TAKE 1 TABLET BY MOUTH ONCE DAILY BEFORE BREAKFAST FOR THYROID 90 Tablet 0    benazepriL (LOTENSIN) 40 mg tablet TAKE 1 TABLET BY MOUTH ONCE DAILY  **APPOINTMENT REQUIRED FOR FURTHER REFILLS** 90 Tablet 0    flecainide (TAMBOCOR) 100 mg tablet Take 100 mg by mouth every twelve (12) hours. metoprolol tartrate (LOPRESSOR) 50 mg tablet 25 mg daily. aspirin delayed-release 81 mg tablet Take 1 Tab by mouth daily. 90 Tab 3    [DISCONTINUED] hydroCHLOROthiazide (MICROZIDE) 12.5 mg capsule Take 1 capsule by mouth once daily 90 Capsule 0     No current facility-administered medications on file prior to visit.          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      Past Surgical History:   Procedure Laterality Date    HX CATARACT REMOVAL Right 2016    HX HEENT      nose polyps    HX ORTHOPAEDIC      L elbow surgery    HX PACEMAKER PLACEMENT N/A 2022        Family History   Problem Relation Age of Onset    Hypertension Mother     Stroke Mother     Heart Disease Brother         late 61    Cancer Brother         Lung    Stroke Father     Cancer Sister         Lung        Social History     Socioeconomic History    Marital status:      Spouse name: Not on file    Number of children: Not on file    Years of education: Not on file    Highest education level: Not on file   Occupational History    Not on file   Tobacco Use    Smoking status: Former     Packs/day: 1.00     Years: 16.00     Pack years: 16.00     Types: Cigarettes     Quit date: 3/12/1988     Years since quittin.5    Smokeless tobacco: Never   Vaping Use    Vaping Use: Never used   Substance and Sexual Activity    Alcohol use: No    Drug use: Not Currently     Types: Prescription, Marijuana     Comment: \"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\"    Sexual activity: Yes     Partners: Female   Other Topics Concern    Not on file   Social History Narrative    Not on file     Social Determinants of Health     Financial Resource Strain: Not on file   Food Insecurity: Not on file   Transportation Needs: Not on file   Physical Activity: Not on file   Stress: Not on file   Social Connections: Not on file   Intimate Partner Violence: Not on file   Housing Stability: Not on file           Visit Vitals  BP (!) 152/78 (BP 1 Location: Left upper arm, BP Patient Position: Sitting) Comment: 160/83 on his machine Pulse 77   Temp 97.8 °F (36.6 °C) (Oral)   Resp 12   Ht 5' 10\" (1.778 m)   Wt 175 lb (79.4 kg)   SpO2 95%   BMI 25.11 kg/m²       Review of Systems   Constitutional:  Negative for chills, fever, malaise/fatigue and weight loss. Respiratory:  Negative for cough, shortness of breath and wheezing. Cardiovascular:  Negative for chest pain, palpitations and leg swelling. Gastrointestinal:  Negative for abdominal pain, blood in stool, constipation, diarrhea, heartburn, melena, nausea and vomiting. Genitourinary:  Negative for dysuria and frequency. Musculoskeletal:  Positive for joint pain. Negative for myalgias. Skin:  Negative for rash. Neurological:  Negative for dizziness, weakness and headaches. Endo/Heme/Allergies:  Does not bruise/bleed easily. Psychiatric/Behavioral:  Negative for depression. All other systems reviewed and are negative. Physical Exam  Vitals and nursing note reviewed. Constitutional:       Appearance: Normal appearance. He is normal weight. HENT:      Head: Normocephalic and atraumatic. Right Ear: External ear normal.      Left Ear: External ear normal.      Nose: Nose normal.      Mouth/Throat:      Mouth: Mucous membranes are moist.      Pharynx: Oropharynx is clear. No oropharyngeal exudate or posterior oropharyngeal erythema. Eyes:      Conjunctiva/sclera: Conjunctivae normal.      Pupils: Pupils are equal, round, and reactive to light. Neck:      Vascular: No carotid bruit. Cardiovascular:      Rate and Rhythm: Normal rate and regular rhythm. Pulses: Normal pulses. Heart sounds: Normal heart sounds. No murmur heard. Pulmonary:      Effort: Pulmonary effort is normal.      Breath sounds: Normal breath sounds. No stridor. No wheezing, rhonchi or rales. Abdominal:      General: Abdomen is flat. Bowel sounds are normal. There is no distension. Tenderness: There is no abdominal tenderness. There is no guarding.    Musculoskeletal: General: Normal range of motion. Cervical back: Normal range of motion and neck supple. No rigidity. Right lower leg: No edema. Left lower leg: No edema. Skin:     General: Skin is dry. Capillary Refill: Capillary refill takes less than 2 seconds. Neurological:      General: No focal deficit present. Mental Status: He is alert and oriented to person, place, and time. Mental status is at baseline. Sensory: No sensory deficit. Motor: No weakness. Psychiatric:         Mood and Affect: Mood normal.        No results found for this or any previous visit (from the past 24 hour(s)). ASSESSMENT AND PLAN  Diagnoses and all orders for this visit:    1. Medicare annual wellness visit, subsequent    2. SSS (sick sinus syndrome) ([facility] [address]  Pacemaker in place  3. Pacemaker    4. Chronic atrial fibrillation (HCC)  Continue Flecainide and BB  5. Essential hypertension  BP at goal. Home BP cuff reading 10-15 points higher when measured against hand-manual read. Home readings are running between 120-150, with highest being 157. I am comfortable continuing off HCTZ given recent hyponatremia/dehydration ED trip  6. Hypothyroidism due to acquired atrophy of thyroid  Recheck in spring  7. Glucose intolerance (impaired glucose tolerance)  At goal  8. Stage 3b chronic kidney disease ([facility] [address]  Improved   9. Hypercholesterolemia  At goal, continue lovastatin  10. Chronic right shoulder pain  -     traMADoL (ULTRAM) 50 mg tablet; Take 1 Tablet by mouth nightly as needed for Pain for up to 21 days. Max Daily Amount: 50 mg.  -     REFERRAL TO ORTHOPEDICS  Send in tramadol and send for Cortisone shot with ortho  11. Primary osteoarthritis of right shoulder  -     traMADoL (ULTRAM) 50 mg tablet; Take 1 Tablet by mouth nightly as needed for Pain for up to 21 days.  Max Daily Amount: 50 mg.  -     REFERRAL TO ORTHOPEDICS           I have discussed the diagnosis with the patient and the intended plan as seen in the above orders. Patient is in agreement. The patient has received an after-visit summary and questions were answered concerning future plans. I have discussed medication side effects and warnings with the patient as well.     Lois Meng PA-C

## 2022-10-04 DIAGNOSIS — M19.011 PRIMARY OSTEOARTHRITIS OF RIGHT SHOULDER: ICD-10-CM

## 2022-10-04 DIAGNOSIS — M25.511 CHRONIC RIGHT SHOULDER PAIN: ICD-10-CM

## 2022-10-04 DIAGNOSIS — G89.29 CHRONIC RIGHT SHOULDER PAIN: ICD-10-CM

## 2022-10-04 RX ORDER — TRAMADOL HYDROCHLORIDE 50 MG/1
TABLET ORAL
Qty: 30 TABLET | Refills: 2 | Status: SHIPPED | OUTPATIENT
Start: 2022-10-04 | End: 2022-11-03

## 2022-10-10 RX ORDER — LEVOTHYROXINE SODIUM 75 UG/1
TABLET ORAL
Qty: 90 TABLET | Refills: 0 | Status: SHIPPED | OUTPATIENT
Start: 2022-10-10

## 2022-10-10 RX ORDER — ALLOPURINOL 300 MG/1
TABLET ORAL
Qty: 90 TABLET | Refills: 0 | Status: SHIPPED | OUTPATIENT
Start: 2022-10-10

## 2022-10-24 ENCOUNTER — OFFICE VISIT (OUTPATIENT)
Dept: INTERNAL MEDICINE CLINIC | Age: 82
End: 2022-10-24
Payer: MEDICARE

## 2022-10-24 VITALS
HEIGHT: 71 IN | RESPIRATION RATE: 18 BRPM | TEMPERATURE: 98.7 F | DIASTOLIC BLOOD PRESSURE: 76 MMHG | HEART RATE: 77 BPM | BODY MASS INDEX: 24.16 KG/M2 | OXYGEN SATURATION: 94 % | WEIGHT: 172.6 LBS | SYSTOLIC BLOOD PRESSURE: 126 MMHG

## 2022-10-24 DIAGNOSIS — N30.01 ACUTE CYSTITIS WITH HEMATURIA: Primary | ICD-10-CM

## 2022-10-24 DIAGNOSIS — R30.0 DYSURIA: ICD-10-CM

## 2022-10-24 LAB
BILIRUB UR QL STRIP: NEGATIVE
GLUCOSE UR-MCNC: NEGATIVE MG/DL
KETONES P FAST UR STRIP-MCNC: NEGATIVE MG/DL
PH UR STRIP: 5.5 [PH] (ref 4.6–8)
PROT UR QL STRIP: NORMAL
SP GR UR STRIP: 1.03 (ref 1–1.03)
UA UROBILINOGEN AMB POC: NORMAL (ref 0.2–1)
URINALYSIS CLARITY POC: CLEAR
URINALYSIS COLOR POC: YELLOW
URINE BLOOD POC: NORMAL
URINE LEUKOCYTES POC: NORMAL
URINE NITRITES POC: NEGATIVE

## 2022-10-24 PROCEDURE — G8427 DOCREV CUR MEDS BY ELIG CLIN: HCPCS | Performed by: INTERNAL MEDICINE

## 2022-10-24 PROCEDURE — 1101F PT FALLS ASSESS-DOCD LE1/YR: CPT | Performed by: INTERNAL MEDICINE

## 2022-10-24 PROCEDURE — G8432 DEP SCR NOT DOC, RNG: HCPCS | Performed by: INTERNAL MEDICINE

## 2022-10-24 PROCEDURE — G8420 CALC BMI NORM PARAMETERS: HCPCS | Performed by: INTERNAL MEDICINE

## 2022-10-24 PROCEDURE — 81002 URINALYSIS NONAUTO W/O SCOPE: CPT | Performed by: INTERNAL MEDICINE

## 2022-10-24 PROCEDURE — 1123F ACP DISCUSS/DSCN MKR DOCD: CPT | Performed by: INTERNAL MEDICINE

## 2022-10-24 PROCEDURE — G8536 NO DOC ELDER MAL SCRN: HCPCS | Performed by: INTERNAL MEDICINE

## 2022-10-24 PROCEDURE — 99213 OFFICE O/P EST LOW 20 MIN: CPT | Performed by: INTERNAL MEDICINE

## 2022-10-24 PROCEDURE — G8752 SYS BP LESS 140: HCPCS | Performed by: INTERNAL MEDICINE

## 2022-10-24 PROCEDURE — G8754 DIAS BP LESS 90: HCPCS | Performed by: INTERNAL MEDICINE

## 2022-10-24 RX ORDER — NITROFURANTOIN 25; 75 MG/1; MG/1
100 CAPSULE ORAL 2 TIMES DAILY
Qty: 14 CAPSULE | Refills: 0 | Status: SHIPPED | OUTPATIENT
Start: 2022-10-24 | End: 2022-10-31

## 2022-10-24 NOTE — PROGRESS NOTES
Justyn Choi  Identified pt with two pt identifiers(name and ). Chief Complaint   Patient presents with    UTI       Reviewed record In preparation for visit and have obtained necessary documentation. 1. Have you been to the ER, urgent care clinic or hospitalized since your last visit? No     2. Have you seen or consulted any other health care providers outside of the [address] [place] [address] since your last visit? Include any pap smears or colon screening. No    Vitals reviewed with provider. Health Maintenance reviewed:     Health Maintenance Due   Topic    COVID-19 Vaccine (1)    Flu Vaccine (1)          Wt Readings from Last 3 Encounters:   22 175 lb (79.4 kg)   22 180 lb (81.6 kg)   22 180 lb (81.6 kg)        Temp Readings from Last 3 Encounters:   22 97.8 °F (36.6 °C) (Oral)   22 98.3 °F (36.8 °C)   22 97.8 °F (36.6 °C) (Oral)        BP Readings from Last 3 Encounters:   22 128/70   22 (!) 140/62   22 134/73        Pulse Readings from Last 3 Encounters:   22 77   22 64   22 64      There were no vitals filed for this visit. Learning Assessment:   :       Learning Assessment 2014   PRIMARY LEARNER Patient   PRIMARY LANGUAGE ENGLISH   LEARNER PREFERENCE PRIMARY DEMONSTRATION   ANSWERED BY patient   RELATIONSHIP SELF        Depression Screening:   :       3 most recent PHQ Screens 2022   Little interest or pleasure in doing things Not at all   Feeling down, depressed, irritable, or hopeless Not at all   Total Score PHQ 2 0        Fall Risk Assessment:   :       Fall Risk Assessment, last 12 mths 2022   Able to walk? Yes   Fall in past 12 months? 1   Do you feel unsteady? 0   Are you worried about falling 0   Number of falls in past 12 months 1   Fall with injury?  1        Abuse Screening:   :       Abuse Screening Questionnaire 2022 8/3/2021 2020 2019 2018 2016 2014   Do you ever feel afraid of your partner? N N N N N N N   Are you in a relationship with someone who physically or mentally threatens you? N N N N N N N   Is it safe for you to go home?  Y Y Y Y Y Y Y        ADL Screening:   :       ADL Assessment 9/26/2022   Feeding yourself No Help Needed   Getting from bed to chair No Help Needed   Getting dressed No Help Needed   Bathing or showering No Help Needed   Walk across the room (includes cane/walker) No Help Needed   Using the telphone No Help Needed   Taking your medications No Help Needed   Preparing meals No Help Needed   Managing money (expenses/bills) No Help Needed   Moderately strenuous housework (laundry) No Help Needed   Shopping for personal items (toiletries/medicines) No Help Needed   Shopping for groceries No Help Needed   Driving No Help Needed   Climbing a flight of stairs No Help Needed   Getting to places beyond walking distances No Help Needed

## 2022-10-24 NOTE — PROGRESS NOTES
CC:   Chief Complaint   Patient presents with    UTI       HISTORY OF PRESENT ILLNESS  Debbie Dale is a 80 y.o. male. Complains of dysuria for the past 4 days. Also increased urinary urgency and frequency with mild odor. Denies hematuria, fevers, or chills. His wife, a nurse, recommended he come in for evaluation. No history of UTI as an adult. Has history of kidney stones.       Patient Active Problem List   Diagnosis Code    Atrial fibrillation, currently in sinus rhythm Z86.79    Hypercholesterolemia E78.00    Nephrolithiasis N20.0    Gout M10.9    Glucose intolerance (impaired glucose tolerance) R73.02    Abnormal LFTs R79.89    Essential hypertension I10    Hypothyroidism due to acquired atrophy of thyroid E03.4    Advance directive discussed with patient Z71.89    Combined forms of 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 eye H25.811    Combined forms of age-related cataract of left eye H25.812    Stage 3b chronic kidney disease ([address] N18.32    SSS (sick sinus syndrome) ([facility]) I49.5    Chronic atrial fibrillation ([facility] [address] I48.20    Pacemaker Z95.0     Past Medical History:   Diagnosis Date    Afib ([facility])     Arthritis     L hand and bilat knees    Glucose intolerance (pre-diabetes)     Gout     Hypercholesterolemia     Hypertension     Ill-defined condition     gout    Nephrolithiasis     Thyroid disease     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     Allergies   Allergen Reactions    Bee Sting [Sting, Bee] Anaphylaxis    Pcn [Penicillins] Swelling       Current Outpatient Medications   Medication Sig Dispense Refill    allopurinoL (ZYLOPRIM) 300 mg tablet Take 1 tablet by mouth once daily 90 Tablet 0    levothyroxine (SYNTHROID) 75 mcg tablet TAKE 1 TABLET BY MOUTH ONCE DAILY BEFORE BREAKFAST FOR THYROID 90 Tablet 0    traMADoL (ULTRAM) 50 mg tablet TAKE 1 TABLET BY MOUTH NIGHTLY AS NEEDED FOR  PAIN  MAX  DAILY  AMOUNT  50MG 30 Tablet 2    amLODIPine (NORVASC) 10 mg tablet Take 1 tablet by mouth once daily 90 Tablet 0 lovastatin (MEVACOR) 20 mg tablet Take 1 tablet by mouth nightly 90 Tablet 0    benazepriL (LOTENSIN) 40 mg tablet TAKE 1 TABLET BY MOUTH ONCE DAILY  **APPOINTMENT REQUIRED FOR FURTHER REFILLS** 90 Tablet 0    flecainide (TAMBOCOR) 100 mg tablet Take 100 mg by mouth every twelve (12) hours. metoprolol tartrate (LOPRESSOR) 50 mg tablet 25 mg daily. aspirin delayed-release 81 mg tablet Take 1 Tab by mouth daily. 90 Tab 3         PHYSICAL EXAM  Visit Vitals  /76 (BP 1 Location: Left upper arm, BP Patient Position: Sitting, BP Cuff Size: Large adult)   Pulse 77   Temp 98.7 °F (37.1 °C) (Oral)   Resp 18   Ht 5' 11\" (1.803 m)   Wt 172 lb 9.6 oz (78.3 kg)   SpO2 94%   BMI 24.07 kg/m²       General: Well-developed and well-nourished, no distress. HEENT:  Head normocephalic/atraumatic, no scleral icterus  Neurological: Alert and oriented. Psychiatric: Normal mood and affect. Behavior is normal.     Results for orders placed or performed in visit on 10/24/22   AMB POC URINALYSIS DIP STICK MANUAL W/O MICRO   Result Value Ref Range    Color (UA POC) Yellow     Clarity (UA POC) Clear     Glucose (UA POC) Negative Negative    Bilirubin (UA POC) Negative Negative    Ketones (UA POC) Negative Negative    Specific gravity (UA POC) 1.030 1.001 - 1.035    Blood (UA POC) Trace Negative    pH (UA POC) 5.5 4.6 - 8.0    Protein (UA POC) 1+ Negative    Urobilinogen (UA POC) 0.2 mg/dL 0.2 - 1    Nitrites (UA POC) Negative Negative    Leukocyte esterase (UA POC) Trace Negative         ASSESSMENT AND PLAN    ICD-10-CM ICD-9-CM    1. Acute cystitis with hematuria  N30.01 595.0 CULTURE, URINE      nitrofurantoin, macrocrystal-monohydrate, (MACROBID) 100 mg capsule      CULTURE, URINE      2. Dysuria  R30.0 788.1 AMB POC URINALYSIS DIP STICK MANUAL W/O MICRO        Diagnoses and all orders for this visit:    1. Acute cystitis with hematuria  Urine dip shows signs of UTI. At risk for UTI due to history of kidney stones. Discussed increasing water intake. -     Start nitrofurantoin, macrocrystal-monohydrate, (MACROBID) 100 mg capsule; Take 1 Capsule by mouth two (2) times a day for 7 days. -     CULTURE, URINE; Future    2. Dysuria  -     AMB POC URINALYSIS DIP STICK MANUAL W/O MICRO      Follow-up and Dispositions    Return in about 5 months (around 3/24/2023), or if symptoms worsen or fail to improve, for 6 month FU with Segundo Green HTN, HLD. I have discussed the diagnosis with the patient and the intended plan as seen in the above orders. Patient is in agreement. The patient has received an after-visit summary and questions were answered concerning future plans. I have discussed medication side effects and warnings with the patient as well.

## 2022-10-26 LAB
BACTERIA UR CULT: NORMAL
SPECIMEN STATUS REPORT, ROLRST: NORMAL

## 2022-10-27 NOTE — PROGRESS NOTES
Message sent to patient by My Chart:  Your urine culture did not show any specific bacteria. Are your urinary symptoms getting better?     Dr. Zelda Mathur

## 2022-11-28 ENCOUNTER — TELEPHONE (OUTPATIENT)
Dept: INTERNAL MEDICINE CLINIC | Age: 82
End: 2022-11-28

## 2022-11-28 DIAGNOSIS — M79.673 PAIN OF FOOT, UNSPECIFIED LATERALITY: Primary | ICD-10-CM

## 2022-11-28 NOTE — TELEPHONE ENCOUNTER
I called the patients wife Joselito Mayer, on [employer]) and spoke with the patient. He verified his name and date of birth. Is having some pain in the feet - tried to call here last week but never heard back. Pain is in the pad under his big toe - side of baby toe is swollen & painful. I informed him that it would be best to go to a podiatrist for treatment because we do not have the tools needed. He stated understanding but does not know if he needs a referral. I informed him that I would get [facility] to approve a referral and fax it to them. I gave him the contact informed for Dr. Marisol Garcia office. He stated understanding and will go ahead and give them a call.

## 2022-11-28 NOTE — TELEPHONE ENCOUNTER
Patient wife called and said her  is having problems with his feet and they are very painful.  She would like a call back [phone]

## 2023-01-22 RX ORDER — LEVOTHYROXINE SODIUM 75 UG/1
TABLET ORAL
Qty: 90 TABLET | Refills: 0 | Status: SHIPPED | OUTPATIENT
Start: 2023-01-22

## 2023-05-02 RX ORDER — AMLODIPINE BESYLATE 10 MG/1
TABLET ORAL
Qty: 90 TABLET | Refills: 0 | Status: SHIPPED | OUTPATIENT
Start: 2023-05-02

## 2023-06-26 RX ORDER — BENAZEPRIL HYDROCHLORIDE 40 MG/1
TABLET, FILM COATED ORAL
Qty: 90 TABLET | Refills: 0 | Status: SHIPPED | OUTPATIENT
Start: 2023-06-26

## 2023-06-27 DIAGNOSIS — Z86.79 ATRIAL FIBRILLATION, CURRENTLY IN SINUS RHYTHM: Primary | ICD-10-CM

## 2023-06-27 RX ORDER — METOPROLOL SUCCINATE 25 MG/1
25 TABLET, EXTENDED RELEASE ORAL DAILY
Qty: 90 TABLET | Refills: 1 | Status: SHIPPED | OUTPATIENT
Start: 2023-06-27

## 2023-07-03 RX ORDER — FLECAINIDE ACETATE 100 MG/1
100 TABLET ORAL EVERY 12 HOURS
Qty: 180 TABLET | Refills: 1 | Status: SHIPPED | OUTPATIENT
Start: 2023-07-03

## 2023-07-03 NOTE — TELEPHONE ENCOUNTER
PCP: Johana Scott PA-C     Last appt: 10/24/2022    Future Appointments   Date Time Provider [address]  [facility]   7/19/2023  1:30 PM Johana Scott PA-C [facility] BS AMB          Requested Prescriptions     Pending Prescriptions Disp Refills    flecainide (TAMBOCOR) 100 MG tablet 180 tablet 1     Sig: Take 1 tablet by mouth in the morning and 1 tablet in the evening.

## 2023-07-17 RX ORDER — LEVOTHYROXINE SODIUM 0.07 MG/1
TABLET ORAL
Qty: 90 TABLET | Refills: 0 | Status: SHIPPED | OUTPATIENT
Start: 2023-07-17

## 2023-07-18 PROBLEM — R73.03 PREDIABETES: Status: ACTIVE | Noted: 2023-07-18

## 2023-07-19 ENCOUNTER — OFFICE VISIT (OUTPATIENT)
Facility: CLINIC | Age: 83
End: 2023-07-19
Payer: MEDICARE

## 2023-07-19 VITALS
BODY MASS INDEX: 25.28 KG/M2 | HEIGHT: 71 IN | TEMPERATURE: 97.5 F | WEIGHT: 180.6 LBS | HEART RATE: 78 BPM | DIASTOLIC BLOOD PRESSURE: 70 MMHG | OXYGEN SATURATION: 96 % | SYSTOLIC BLOOD PRESSURE: 145 MMHG | RESPIRATION RATE: 16 BRPM

## 2023-07-19 DIAGNOSIS — Z95.0 PACEMAKER: ICD-10-CM

## 2023-07-19 DIAGNOSIS — R73.03 PREDIABETES: ICD-10-CM

## 2023-07-19 DIAGNOSIS — E03.4 HYPOTHYROIDISM DUE TO ACQUIRED ATROPHY OF THYROID: ICD-10-CM

## 2023-07-19 DIAGNOSIS — E78.00 HYPERCHOLESTEROLEMIA: ICD-10-CM

## 2023-07-19 DIAGNOSIS — I48.20 CHRONIC ATRIAL FIBRILLATION (HCC): ICD-10-CM

## 2023-07-19 DIAGNOSIS — I10 ESSENTIAL HYPERTENSION: Primary | ICD-10-CM

## 2023-07-19 DIAGNOSIS — N18.32 STAGE 3B CHRONIC KIDNEY DISEASE (HCC): ICD-10-CM

## 2023-07-19 DIAGNOSIS — I49.5 SSS (SICK SINUS SYNDROME) (HCC): ICD-10-CM

## 2023-07-19 PROCEDURE — 1123F ACP DISCUSS/DSCN MKR DOCD: CPT | Performed by: PHYSICIAN ASSISTANT

## 2023-07-19 PROCEDURE — 3077F SYST BP >= 140 MM HG: CPT | Performed by: PHYSICIAN ASSISTANT

## 2023-07-19 PROCEDURE — G8427 DOCREV CUR MEDS BY ELIG CLIN: HCPCS | Performed by: PHYSICIAN ASSISTANT

## 2023-07-19 PROCEDURE — 3078F DIAST BP <80 MM HG: CPT | Performed by: PHYSICIAN ASSISTANT

## 2023-07-19 PROCEDURE — 99214 OFFICE O/P EST MOD 30 MIN: CPT | Performed by: PHYSICIAN ASSISTANT

## 2023-07-19 PROCEDURE — 1036F TOBACCO NON-USER: CPT | Performed by: PHYSICIAN ASSISTANT

## 2023-07-19 PROCEDURE — G8419 CALC BMI OUT NRM PARAM NOF/U: HCPCS | Performed by: PHYSICIAN ASSISTANT

## 2023-07-19 SDOH — ECONOMIC STABILITY: FOOD INSECURITY: WITHIN THE PAST 12 MONTHS, YOU WORRIED THAT YOUR FOOD WOULD RUN OUT BEFORE YOU GOT MONEY TO BUY MORE.: NEVER TRUE

## 2023-07-19 SDOH — ECONOMIC STABILITY: HOUSING INSECURITY
IN THE LAST 12 MONTHS, WAS THERE A TIME WHEN YOU DID NOT HAVE A STEADY PLACE TO SLEEP OR SLEPT IN A SHELTER (INCLUDING NOW)?: NO

## 2023-07-19 SDOH — ECONOMIC STABILITY: INCOME INSECURITY: HOW HARD IS IT FOR YOU TO PAY FOR THE VERY BASICS LIKE FOOD, HOUSING, MEDICAL CARE, AND HEATING?: NOT HARD AT ALL

## 2023-07-19 SDOH — ECONOMIC STABILITY: FOOD INSECURITY: WITHIN THE PAST 12 MONTHS, THE FOOD YOU BOUGHT JUST DIDN'T LAST AND YOU DIDN'T HAVE MONEY TO GET MORE.: NEVER TRUE

## 2023-07-19 ASSESSMENT — ENCOUNTER SYMPTOMS
ABDOMINAL PAIN: 0
NAUSEA: 0
BLOOD IN STOOL: 0
CONSTIPATION: 0
DIARRHEA: 0
VOMITING: 0
SHORTNESS OF BREATH: 0

## 2023-07-19 ASSESSMENT — PATIENT HEALTH QUESTIONNAIRE - PHQ9
SUM OF ALL RESPONSES TO PHQ QUESTIONS 1-9: 0
SUM OF ALL RESPONSES TO PHQ QUESTIONS 1-9: 0
1. LITTLE INTEREST OR PLEASURE IN DOING THINGS: 0
SUM OF ALL RESPONSES TO PHQ QUESTIONS 1-9: 0
2. FEELING DOWN, DEPRESSED OR HOPELESS: 0
SUM OF ALL RESPONSES TO PHQ9 QUESTIONS 1 & 2: 0
SUM OF ALL RESPONSES TO PHQ QUESTIONS 1-9: 0

## 2023-07-19 NOTE — PROGRESS NOTES
Sruthi Valdez  Identified pt with two pt identifiers(name and ). Chief Complaint   Patient presents with    Atrial Fibrillation     Room 4B //     Hypertension    Thyroid Problem    Cholesterol Problem    Blood Sugar Problem       Reviewed record In preparation for visit and have obtained necessary documentation. 1. Have you been to the ER, urgent care clinic or hospitalized since your last visit? No     2. Have you seen or consulted any other health care providers outside of the [address] [place] [address] since your last visit? Include any pap smears or colon screening. No    Patient has an advance directive. Vitals reviewed with provider. Health Maintenance reviewed:     Health Maintenance Due   Topic    COVID-19 Vaccine (1)    Shingles vaccine (1 of 2)    Lipids           Wt Readings from Last 3 Encounters:   23 180 lb 9.6 oz (81.9 kg)   10/24/22 172 lb 9.6 oz (78.3 kg)   22 175 lb (79.4 kg)        Temp Readings from Last 3 Encounters:   No data found for Temp        BP Readings from Last 3 Encounters:   10/24/22 126/76   22 128/70   22 134/73        Pulse Readings from Last 3 Encounters:   10/24/22 77   22 77   22 64      No flowsheet data found. Learning Assessment:   :     No flowsheet data found. Fall Risk Assessment:     Amb Fall Risk Assessment and TUG Test 2022 2022 2022 2022 2022 8/3/2021   Fall in past 12 months? 1 0 - - - 0   Able to walk? Yes Yes - - - Yes   Total Score - - 1 1 1 -         Abuse Screening:     No flowsheet data found. ADL Screening:     No flowsheet data found.

## 2023-07-19 NOTE — PROGRESS NOTES
Britta Lan is a 80y.o. year old male seen in clinic today for   Chief Complaint   Patient presents with    Atrial Fibrillation     Room 4B //     Hypertension    Thyroid Problem    Cholesterol Problem    Blood Sugar Problem       he is here today to follow up for A-fib, HTN, HLD, Hypothyroid, Pre-DM    Doing well overall, no new complaints other than feeling more fatigue especially after working in house. Wife has terminal breast cancer so he is doing more around house in terms of cleaning. A-fib/SSS- Sp pacemaker. On 25 mg metoprolol succinate XL and flecainide daily. Follows with Dr. Bebeto Abraham, had good check up with EKG a few months ago. Pre-DM: Very active around house. A1C has gone down year to year from 6.2 to 6.1 to 6.0 last year. Continues to snack on sugary snacks often more than he should but his weight has stayed normal and he stays very active during day. Hypertension: Controlled with amlodipine 10 mg, benazepril 40 mg, metoprolol 25 mg XL  Compliant with medications? ALWAYS  Compliant with diet? Low salt diet, wife cooks often  Compliant with exercise? Active doing yard work  Average blood pressure readings outside of office. 120/60    Denies any HA, dizziness, CP, SOB, edema, visual changes    Patient has has hx of HLD. Takes Lovastatin 20 mg. No RUQ pain, no jaundice, no muscle aches. Pt has a hx of Hypothyroidism. They take 75 mcg of thyroid replacement. They are compliant with their daily medications. Pt denies any temperature imbalances, hair related symptoms, bowel changes or abnormal weight gain/loss. Lab Results   Component Value Date/Time    TSH 1.020 03/11/2022 11:46 AM       he specifically denies any CP, SOB, HA. Dizziness, fevers, chills, N/V/D, urinary symptoms or other bowel changes.     Current Outpatient Medications on File Prior to Visit   Medication Sig Dispense Refill    levothyroxine (SYNTHROID) 75 MCG tablet TAKE 1 TABLET BY MOUTH ONCE DAILY BEFORE BREAKFAST

## 2023-08-07 RX ORDER — AMLODIPINE BESYLATE 10 MG/1
TABLET ORAL
Qty: 90 TABLET | Refills: 3 | Status: SHIPPED | OUTPATIENT
Start: 2023-08-07

## 2023-08-07 NOTE — TELEPHONE ENCOUNTER
PCP: Viktor Segundo PA-C     Last appt:  7/19/2023    No future appointments.        Requested Prescriptions     Pending Prescriptions Disp Refills    amLODIPine (NORVASC) 10 MG tablet [Pharmacy Med Name: amLODIPine Besylate 10 MG Oral Tablet] 90 tablet 0     Sig: Take 1 tablet by mouth once daily

## 2023-08-09 DIAGNOSIS — E78.00 HYPERCHOLESTEROLEMIA: ICD-10-CM

## 2023-08-09 DIAGNOSIS — I10 ESSENTIAL HYPERTENSION: ICD-10-CM

## 2023-08-09 DIAGNOSIS — N18.32 STAGE 3B CHRONIC KIDNEY DISEASE (HCC): ICD-10-CM

## 2023-08-09 DIAGNOSIS — R73.03 PREDIABETES: ICD-10-CM

## 2023-08-10 LAB
ALBUMIN SERPL-MCNC: 3.7 G/DL (ref 3.5–5)
ALBUMIN/GLOB SERPL: 0.9 (ref 1.1–2.2)
ALP SERPL-CCNC: 82 U/L (ref 45–117)
ALT SERPL-CCNC: 26 U/L (ref 12–78)
ANION GAP SERPL CALC-SCNC: 5 MMOL/L (ref 5–15)
APPEARANCE UR: CLEAR
AST SERPL-CCNC: 19 U/L (ref 15–37)
BACTERIA URNS QL MICRO: NEGATIVE /HPF
BASOPHILS # BLD: 0.1 K/UL (ref 0–0.1)
BASOPHILS NFR BLD: 1 % (ref 0–1)
BILIRUB SERPL-MCNC: 0.4 MG/DL (ref 0.2–1)
BILIRUB UR QL: NEGATIVE
BUN SERPL-MCNC: 29 MG/DL (ref 6–20)
BUN/CREAT SERPL: 17 (ref 12–20)
CALCIUM SERPL-MCNC: 9.2 MG/DL (ref 8.5–10.1)
CHLORIDE SERPL-SCNC: 107 MMOL/L (ref 97–108)
CHOLEST SERPL-MCNC: 148 MG/DL
CO2 SERPL-SCNC: 25 MMOL/L (ref 21–32)
COLOR UR: ABNORMAL
CREAT SERPL-MCNC: 1.7 MG/DL (ref 0.7–1.3)
DIFFERENTIAL METHOD BLD: ABNORMAL
EOSINOPHIL # BLD: 0.8 K/UL (ref 0–0.4)
EOSINOPHIL NFR BLD: 12 % (ref 0–7)
EPITH CASTS URNS QL MICRO: ABNORMAL /LPF
ERYTHROCYTE [DISTWIDTH] IN BLOOD BY AUTOMATED COUNT: 14.7 % (ref 11.5–14.5)
EST. AVERAGE GLUCOSE BLD GHB EST-MCNC: 117 MG/DL
GLOBULIN SER CALC-MCNC: 4.2 G/DL (ref 2–4)
GLUCOSE SERPL-MCNC: 107 MG/DL (ref 65–100)
GLUCOSE UR STRIP.AUTO-MCNC: NEGATIVE MG/DL
HBA1C MFR BLD: 5.7 % (ref 4–5.6)
HCT VFR BLD AUTO: 44 % (ref 36.6–50.3)
HDLC SERPL-MCNC: 43 MG/DL
HDLC SERPL: 3.4 (ref 0–5)
HGB BLD-MCNC: 14.2 G/DL (ref 12.1–17)
HGB UR QL STRIP: NEGATIVE
HYALINE CASTS URNS QL MICRO: ABNORMAL /LPF (ref 0–5)
IMM GRANULOCYTES # BLD AUTO: 0 K/UL (ref 0–0.04)
IMM GRANULOCYTES NFR BLD AUTO: 0 % (ref 0–0.5)
KETONES UR QL STRIP.AUTO: NEGATIVE MG/DL
LDLC SERPL CALC-MCNC: 80.2 MG/DL (ref 0–100)
LEUKOCYTE ESTERASE UR QL STRIP.AUTO: ABNORMAL
LYMPHOCYTES # BLD: 1.4 K/UL (ref 0.8–3.5)
LYMPHOCYTES NFR BLD: 22 % (ref 12–49)
MCH RBC QN AUTO: 31.6 PG (ref 26–34)
MCHC RBC AUTO-ENTMCNC: 32.3 G/DL (ref 30–36.5)
MCV RBC AUTO: 97.8 FL (ref 80–99)
MONOCYTES # BLD: 0.8 K/UL (ref 0–1)
MONOCYTES NFR BLD: 12 % (ref 5–13)
NEUTS SEG # BLD: 3.4 K/UL (ref 1.8–8)
NEUTS SEG NFR BLD: 53 % (ref 32–75)
NITRITE UR QL STRIP.AUTO: NEGATIVE
NRBC # BLD: 0 K/UL (ref 0–0.01)
NRBC BLD-RTO: 0 PER 100 WBC
PH UR STRIP: 5 (ref 5–8)
PLATELET # BLD AUTO: 203 K/UL (ref 150–400)
PMV BLD AUTO: 11.2 FL (ref 8.9–12.9)
POTASSIUM SERPL-SCNC: 4.5 MMOL/L (ref 3.5–5.1)
PROT SERPL-MCNC: 7.9 G/DL (ref 6.4–8.2)
PROT UR STRIP-MCNC: 100 MG/DL
RBC # BLD AUTO: 4.5 M/UL (ref 4.1–5.7)
RBC #/AREA URNS HPF: ABNORMAL /HPF (ref 0–5)
SODIUM SERPL-SCNC: 137 MMOL/L (ref 136–145)
SP GR UR REFRACTOMETRY: 1.02 (ref 1–1.03)
T4 SERPL-MCNC: 10.2 UG/DL (ref 4.5–12.1)
TRIGL SERPL-MCNC: 124 MG/DL
TSH SERPL DL<=0.05 MIU/L-ACNC: 0.75 UIU/ML (ref 0.36–3.74)
UROBILINOGEN UR QL STRIP.AUTO: 0.2 EU/DL (ref 0.2–1)
VLDLC SERPL CALC-MCNC: 24.8 MG/DL
WBC # BLD AUTO: 6.4 K/UL (ref 4.1–11.1)
WBC URNS QL MICRO: ABNORMAL /HPF (ref 0–4)

## 2023-09-25 RX ORDER — BENAZEPRIL HYDROCHLORIDE 40 MG/1
TABLET, FILM COATED ORAL
Qty: 90 TABLET | Refills: 2 | Status: SHIPPED | OUTPATIENT
Start: 2023-09-25

## 2023-09-25 NOTE — TELEPHONE ENCOUNTER
PCP: Jaison Cleaning PA-C     Last appt:  7/19/2023    No future appointments.        Requested Prescriptions     Pending Prescriptions Disp Refills    benazepril (LOTENSIN) 40 MG tablet [Pharmacy Med Name: Benazepril HCl 40 MG Oral Tablet] 90 tablet 0     Sig: Take 1 tablet by mouth once daily

## 2023-10-09 RX ORDER — LEVOTHYROXINE SODIUM 0.07 MG/1
TABLET ORAL
Qty: 90 TABLET | Refills: 2 | Status: SHIPPED | OUTPATIENT
Start: 2023-10-09

## 2023-10-09 NOTE — TELEPHONE ENCOUNTER
PCP: Nathaly Shearer PA-C     Last appt:  7/19/2023    No future appointments.        Requested Prescriptions     Pending Prescriptions Disp Refills    levothyroxine (SYNTHROID) 75 MCG tablet [Pharmacy Med Name: Levothyroxine Sodium 75 MCG Oral Tablet] 90 tablet 0     Sig: TAKE 1 TABLET BY MOUTH ONCE DAILY BEFORE BREAKFAST FOR THYROID

## 2023-11-07 ENCOUNTER — HOSPITAL ENCOUNTER (OUTPATIENT)
Facility: HOSPITAL | Age: 83
Discharge: HOME | End: 2023-11-10
Attending: INTERNAL MEDICINE
Payer: MEDICARE

## 2023-11-07 DIAGNOSIS — N18.32 STAGE 3B CHRONIC KIDNEY DISEASE (HCC): ICD-10-CM

## 2023-11-07 PROCEDURE — 76770 US EXAM ABDO BACK WALL COMP: CPT

## 2023-11-09 ENCOUNTER — TELEPHONE (OUTPATIENT)
Facility: CLINIC | Age: 83
End: 2023-11-09

## 2023-11-09 NOTE — TELEPHONE ENCOUNTER
----- Message from Jameson Adair sent at 11/9/2023  9:07 AM EST -----  Subject: Appointment Request    Reason for Call: Established Patient Appointment needed: Routine Medicare   AWV    QUESTIONS    Reason for appointment request? No appointments available during search     Additional Information for Provider?  The patient would like an AWV   scheduled in 12/2023 except for 12/06/2023 patient can be reached at   [phone] ok to leave a message  ---------------------------------------------------------------------------  --------------  [address]  [phone]; OK to leave message on voicemail  ---------------------------------------------------------------------------  --------------  SCRIPT ANSWERS

## 2023-11-10 RX ORDER — LOVASTATIN 20 MG/1
TABLET ORAL
Qty: 90 TABLET | Refills: 3 | Status: SHIPPED | OUTPATIENT
Start: 2023-11-10

## 2023-11-10 NOTE — TELEPHONE ENCOUNTER
PCP: Rafa Gallegos PA-C     Last appt:  7/19/2023    No future appointments.       Requested Prescriptions     Pending Prescriptions Disp Refills    lovastatin (MEVACOR) 20 MG tablet [Pharmacy Med Name: Lovastatin 20 MG Oral Tablet] 90 tablet 0     Sig: Take 1 tablet by mouth nightly

## 2023-11-13 NOTE — TELEPHONE ENCOUNTER
Pt would like a 30-day supply to go to [facility] in [place] and please send the rest to [facility] order. Pt is currently out of medication. Refill Routing Note   Medication(s) are not appropriate for processing by [facility] for the following reason(s):      New or recently adjusted medication    [facility] action(s):  Defer Care Due:  None identified            Appointments  past 12m or future 3m with PCP    Date Provider   Last Visit   9/18/2023 Clarisse Richardson MD   Next Visit   Visit date not found Clarisse Richardson MD   ED visits in past 90 days: 0        Note composed:10:11 AM 11/13/2023

## 2023-12-26 NOTE — TELEPHONE ENCOUNTER
PCP: Nathaly Shearer PA-C     Last appt:  7/19/2023    No future appointments.        Requested Prescriptions     Pending Prescriptions Disp Refills    metoprolol succinate (TOPROL XL) 25 MG extended release tablet [Pharmacy Med Name: Metoprolol Succinate ER 25 MG Oral Tablet Extended Release 24 Hour] 90 tablet 0     Sig: Take 1 tablet by mouth once daily

## 2023-12-26 NOTE — TELEPHONE ENCOUNTER
PCP: Samuel Acosta PA-C     Last appt:  7/19/2023    No future appointments.        Requested Prescriptions     Pending Prescriptions Disp Refills    flecainide (TAMBOCOR) 100 MG tablet [Pharmacy Med Name: Flecainide Acetate 100 MG Oral Tablet] 180 tablet 0     Sig: TAKE 1 TABLET BY MOUTH IN THE MORNING AND 1 IN THE EVENING

## 2023-12-27 RX ORDER — METOPROLOL SUCCINATE 25 MG/1
25 TABLET, EXTENDED RELEASE ORAL DAILY
Qty: 90 TABLET | Refills: 0 | Status: SHIPPED | OUTPATIENT
Start: 2023-12-27

## 2023-12-27 RX ORDER — FLECAINIDE ACETATE 100 MG/1
TABLET ORAL
Qty: 180 TABLET | Refills: 0 | Status: SHIPPED | OUTPATIENT
Start: 2023-12-27

## 2024-01-15 RX ORDER — ALLOPURINOL 300 MG/1
TABLET ORAL
Qty: 90 TABLET | Refills: 3 | Status: SHIPPED | OUTPATIENT
Start: 2024-01-15

## 2024-01-31 ENCOUNTER — OFFICE VISIT (OUTPATIENT)
Facility: CLINIC | Age: 84
End: 2024-01-31
Payer: MEDICARE

## 2024-01-31 VITALS
TEMPERATURE: 97.4 F | DIASTOLIC BLOOD PRESSURE: 62 MMHG | SYSTOLIC BLOOD PRESSURE: 130 MMHG | RESPIRATION RATE: 16 BRPM | BODY MASS INDEX: 24.5 KG/M2 | OXYGEN SATURATION: 95 % | HEART RATE: 69 BPM | HEIGHT: 71 IN | WEIGHT: 175 LBS

## 2024-01-31 DIAGNOSIS — E03.4 HYPOTHYROIDISM DUE TO ACQUIRED ATROPHY OF THYROID: ICD-10-CM

## 2024-01-31 DIAGNOSIS — I49.5 SSS (SICK SINUS SYNDROME) (HCC): ICD-10-CM

## 2024-01-31 DIAGNOSIS — I48.20 CHRONIC ATRIAL FIBRILLATION (HCC): ICD-10-CM

## 2024-01-31 DIAGNOSIS — Z00.00 ENCOUNTER FOR SUBSEQUENT ANNUAL WELLNESS VISIT (AWV) IN MEDICARE PATIENT: Primary | ICD-10-CM

## 2024-01-31 DIAGNOSIS — M25.562 ACUTE PAIN OF LEFT KNEE: ICD-10-CM

## 2024-01-31 DIAGNOSIS — I10 ESSENTIAL HYPERTENSION: ICD-10-CM

## 2024-01-31 DIAGNOSIS — M62.81 GENERALIZED MUSCLE WEAKNESS: ICD-10-CM

## 2024-01-31 DIAGNOSIS — Z95.0 PACEMAKER: ICD-10-CM

## 2024-01-31 DIAGNOSIS — N18.32 STAGE 3B CHRONIC KIDNEY DISEASE (HCC): ICD-10-CM

## 2024-01-31 DIAGNOSIS — F51.01 PRIMARY INSOMNIA: ICD-10-CM

## 2024-01-31 DIAGNOSIS — E78.00 HYPERCHOLESTEROLEMIA: ICD-10-CM

## 2024-01-31 DIAGNOSIS — R73.02 GLUCOSE INTOLERANCE (IMPAIRED GLUCOSE TOLERANCE): ICD-10-CM

## 2024-01-31 PROCEDURE — 3078F DIAST BP <80 MM HG: CPT | Performed by: PHYSICIAN ASSISTANT

## 2024-01-31 PROCEDURE — G8420 CALC BMI NORM PARAMETERS: HCPCS | Performed by: PHYSICIAN ASSISTANT

## 2024-01-31 PROCEDURE — G8484 FLU IMMUNIZE NO ADMIN: HCPCS | Performed by: PHYSICIAN ASSISTANT

## 2024-01-31 PROCEDURE — G8427 DOCREV CUR MEDS BY ELIG CLIN: HCPCS | Performed by: PHYSICIAN ASSISTANT

## 2024-01-31 PROCEDURE — G0439 PPPS, SUBSEQ VISIT: HCPCS | Performed by: PHYSICIAN ASSISTANT

## 2024-01-31 PROCEDURE — 99214 OFFICE O/P EST MOD 30 MIN: CPT | Performed by: PHYSICIAN ASSISTANT

## 2024-01-31 PROCEDURE — 1123F ACP DISCUSS/DSCN MKR DOCD: CPT | Performed by: PHYSICIAN ASSISTANT

## 2024-01-31 PROCEDURE — 3075F SYST BP GE 130 - 139MM HG: CPT | Performed by: PHYSICIAN ASSISTANT

## 2024-01-31 PROCEDURE — 1036F TOBACCO NON-USER: CPT | Performed by: PHYSICIAN ASSISTANT

## 2024-01-31 RX ORDER — TRAZODONE HYDROCHLORIDE 50 MG/1
50 TABLET ORAL NIGHTLY PRN
Qty: 30 TABLET | Refills: 2 | Status: SHIPPED | OUTPATIENT
Start: 2024-01-31

## 2024-01-31 ASSESSMENT — PATIENT HEALTH QUESTIONNAIRE - PHQ9
SUM OF ALL RESPONSES TO PHQ QUESTIONS 1-9: 0
SUM OF ALL RESPONSES TO PHQ QUESTIONS 1-9: 0
1. LITTLE INTEREST OR PLEASURE IN DOING THINGS: 0
SUM OF ALL RESPONSES TO PHQ QUESTIONS 1-9: 0
SUM OF ALL RESPONSES TO PHQ QUESTIONS 1-9: 0
2. FEELING DOWN, DEPRESSED OR HOPELESS: 0
SUM OF ALL RESPONSES TO PHQ9 QUESTIONS 1 & 2: 0

## 2024-01-31 ASSESSMENT — LIFESTYLE VARIABLES: HOW OFTEN DO YOU HAVE A DRINK CONTAINING ALCOHOL: NEVER

## 2024-01-31 NOTE — PROGRESS NOTES
Analisa Ugalde  Identified pt with two pt identifiers(name and ).     Chief Complaint   Patient presents with    Medicare AWV     Room 4B //     Atrial Fibrillation    Hypertension    Blood Sugar Problem    Cholesterol Problem       Reviewed record In preparation for visit and have obtained necessary documentation.     1. Have you been to the ER, urgent care clinic or hospitalized since your last visit? No     2. Have you seen or consulted any other health care providers outside of the [facility] since your last visit? Include any pap smears or colon screening. No    Patient has an advance directive.     Vitals reviewed with provider.    Health Maintenance reviewed:     Health Maintenance Due   Topic    Respiratory Syncytial Virus (RSV) Pregnant or age 60 yrs+ (1 - 1-dose 60+ series)    Annual Wellness Visit (Medicare)           Wt Readings from Last 3 Encounters:   24 79.4 kg (175 lb)   23 81.9 kg (180 lb 9.6 oz)   10/24/22 78.3 kg (172 lb 9.6 oz)        Temp Readings from Last 3 Encounters:   23 97.5 °F (36.4 °C) (Oral)        BP Readings from Last 3 Encounters:   23 122/80   10/24/22 126/76   22 128/70        Pulse Readings from Last 3 Encounters:   23 78   10/24/22 77   22 77             No data to display                  Learning Assessment:   :         2023     1:30 PM   [facility] AMB LEARNING ASSESSMENT   Primary Learner Patient   level of education DID NOT GRADUATE HIGH SCHOOL   Barriers Factors NONE   Primary Language ENGLISH   Learning Preference OTHER (COMMENT)   Answered By Patient   Relationship to Learner SELF         Fall Risk Assessment:         2024     1:13 PM 2023     1:18 PM 2022     3:17 PM 2022     1:16 PM 2022     7:00 PM 2022     3:22 PM 2022    12:12 PM   Amb Fall Risk Assessment and TUG Test   Do you feel unsteady or are you worried about falling?  yes no        2 or more falls in past year? no no

## 2024-01-31 NOTE — PROGRESS NOTES
Medicare Annual Wellness Visit    Analisa Ugalde is here for Medicare AWV (Room 4B // ), Atrial Fibrillation, Hypertension, Blood Sugar Problem, and Cholesterol Problem    Assessment & Plan   Encounter for subsequent annual wellness visit (AWV) in Medicare patient  SSS (sick sinus syndrome) (HCC)  Stage 3b chronic kidney disease (HCC)  Chronic atrial fibrillation (HCC)  Essential hypertension  Pacemaker  Hypothyroidism due to acquired atrophy of thyroid  Glucose intolerance (impaired glucose tolerance)  Hypercholesterolemia  Acute pain of left knee  -     External Referral To Physical Therapy  Generalized muscle weakness  Primary insomnia  Continue all current medical management. Referral sent to PT for left knee pain. Trazodone prescription sent for intermittent insomnia.  Labs deferred until next visit. Continue follow up with Cardiology yearly and if symptoms recur.     Recommendations for Preventive Services Due: see orders and patient instructions/AVS.  Recommended screening schedule for the next 5-10 years is provided to the patient in written form: see Patient Instructions/AVS.     No follow-ups on file.     Subjective   The following acute and/or chronic problems were also addressed today:  HTN, HLD, A-fib, hypothyroid    Compliant with all meds. Follows with [facility] for [facility]. Has pacemaker. Checks BP at home, runs 120-140 / 80. Asymptomatic.   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 Occasionally reports difficulty falling asleep, can't turn brain off.   HLD: uses 20 mg lovastaitn, lipids over summer.  On flecainide, metoprolol for hx of -afib.   81 ASA for heart history  Hypothyroid 75 mcg, labs in summer.     Patient's complete Health Risk Assessment and screening values have been reviewed and are found in Flowsheets. The following problems were reviewed today and where indicated follow up appointments were made and/or referrals

## 2024-01-31 NOTE — PATIENT INSTRUCTIONS
week. You also may want to swim, bike, or do other activities.     Do not smoke. If you need help quitting, talk to your doctor about stop-smoking programs and medicines. These can increase your chances of quitting for good. Quitting smoking may be the most important step you can take to protect your heart. It is never too late to quit.     Limit alcohol to 2 drinks a day for men and 1 drink a day for women. Too much alcohol can cause health problems.     Manage other health problems such as diabetes, high blood pressure, and high cholesterol. If you think you may have a problem with alcohol or drug use, talk to your doctor.   Medicines    Take your medicines exactly as prescribed. Call your doctor if you think you are having a problem with your medicine.     If your doctor recommends aspirin, take the amount directed each day. Make sure you take aspirin and not another kind of pain reliever, such as acetaminophen (Tylenol).   When should you call for help?   Call 911 if you have symptoms of a heart attack. These may include:    Chest pain or pressure, or a strange feeling in the chest.     Sweating.     Shortness of breath.     Pain, pressure, or a strange feeling in the back, neck, jaw, or upper belly or in one or both shoulders or arms.     Lightheadedness or sudden weakness.     A fast or irregular heartbeat.   After you call 911, the  may tell you to chew 1 adult-strength or 2 to 4 low-dose aspirin. Wait for an ambulance. Do not try to drive yourself.  Watch closely for changes in your health, and be sure to contact your doctor if you have any problems.  Where can you learn more?  Go to https://www.[employer] [place].net/patientEd and enter F075 to learn more about \"A Healthy Heart: Care Instructions.\"  Current as of: June 25, 2023               Content Version: 13.9  © 4712-1019 [employer].   Care instructions adapted under license by [employer]. If you have questions about a medical condition

## 2024-03-25 RX ORDER — METOPROLOL SUCCINATE 25 MG/1
25 TABLET, EXTENDED RELEASE ORAL DAILY
Qty: 90 TABLET | Refills: 3 | Status: SHIPPED | OUTPATIENT
Start: 2024-03-25

## 2024-03-25 NOTE — TELEPHONE ENCOUNTER
PCP: Rafa Gallegos PA-C     Last appt:  1/31/2024      Future Appointments   Date Time Provider Department Center   7/2/2024  2:00 PM Rafa Gallegos PA-C [facility] AMB   7/9/2024  1:30 PM Rafa Gallegos PA-C [facility] AMB          Requested Prescriptions     Pending Prescriptions Disp Refills    metoprolol succinate (TOPROL XL) 25 MG extended release tablet [Pharmacy Med Name: Metoprolol Succinate ER 25 MG Oral Tablet Extended Release 24 Hour] 90 tablet 0     Sig: Take 1 tablet by mouth once daily

## 2024-04-01 RX ORDER — FLECAINIDE ACETATE 100 MG/1
TABLET ORAL
Qty: 180 TABLET | Refills: 1 | Status: SHIPPED | OUTPATIENT
Start: 2024-04-01

## 2024-05-06 ENCOUNTER — TELEPHONE (OUTPATIENT)
Facility: CLINIC | Age: 84
End: 2024-05-06

## 2024-05-06 NOTE — TELEPHONE ENCOUNTER
Pt wife called and wanted a referral for the pt to see someone for a growth on the left side of his nose that is getting bigger

## 2024-05-16 LAB
ALBUMIN SERPL-MCNC: 3.5 G/DL (ref 3.5–5)
ALBUMIN/GLOB SERPL: 0.8 (ref 1.1–2.2)
ALP SERPL-CCNC: 87 U/L (ref 45–117)
ALT SERPL-CCNC: 18 U/L (ref 12–78)
ANION GAP SERPL CALC-SCNC: 6 MMOL/L (ref 5–15)
AST SERPL-CCNC: 19 U/L (ref 15–37)
BASOPHILS # BLD: 0.1 K/UL (ref 0–0.1)
BASOPHILS NFR BLD: 1 % (ref 0–1)
BILIRUB SERPL-MCNC: 0.4 MG/DL (ref 0.2–1)
BUN SERPL-MCNC: 21 MG/DL (ref 6–20)
BUN/CREAT SERPL: 15 (ref 12–20)
CALCIUM SERPL-MCNC: 9.5 MG/DL (ref 8.5–10.1)
CHLORIDE SERPL-SCNC: 106 MMOL/L (ref 97–108)
CHOLEST SERPL-MCNC: 163 MG/DL
CO2 SERPL-SCNC: 26 MMOL/L (ref 21–32)
CREAT SERPL-MCNC: 1.44 MG/DL (ref 0.7–1.3)
DIFFERENTIAL METHOD BLD: ABNORMAL
EOSINOPHIL # BLD: 0.7 K/UL (ref 0–0.4)
EOSINOPHIL NFR BLD: 10 % (ref 0–7)
ERYTHROCYTE [DISTWIDTH] IN BLOOD BY AUTOMATED COUNT: 14.7 % (ref 11.5–14.5)
GLOBULIN SER CALC-MCNC: 4.3 G/DL (ref 2–4)
GLUCOSE SERPL-MCNC: 109 MG/DL (ref 65–100)
HCT VFR BLD AUTO: 43.7 % (ref 36.6–50.3)
HDLC SERPL-MCNC: 45 MG/DL
HDLC SERPL: 3.6 (ref 0–5)
HGB BLD-MCNC: 14.9 G/DL (ref 12.1–17)
IMM GRANULOCYTES # BLD AUTO: 0 K/UL (ref 0–0.04)
IMM GRANULOCYTES NFR BLD AUTO: 0 % (ref 0–0.5)
LDLC SERPL CALC-MCNC: 97 MG/DL (ref 0–100)
LYMPHOCYTES # BLD: 1.2 K/UL (ref 0.8–3.5)
LYMPHOCYTES NFR BLD: 18 % (ref 12–49)
MCH RBC QN AUTO: 32.4 PG (ref 26–34)
MCHC RBC AUTO-ENTMCNC: 34.1 G/DL (ref 30–36.5)
MCV RBC AUTO: 95 FL (ref 80–99)
MONOCYTES # BLD: 0.7 K/UL (ref 0–1)
MONOCYTES NFR BLD: 10 % (ref 5–13)
NEUTS SEG # BLD: 3.9 K/UL (ref 1.8–8)
NEUTS SEG NFR BLD: 61 % (ref 32–75)
NRBC # BLD: 0 K/UL (ref 0–0.01)
NRBC BLD-RTO: 0 PER 100 WBC
PLATELET # BLD AUTO: 225 K/UL (ref 150–400)
PMV BLD AUTO: 10.7 FL (ref 8.9–12.9)
POTASSIUM SERPL-SCNC: 4.2 MMOL/L (ref 3.5–5.1)
PROT SERPL-MCNC: 7.8 G/DL (ref 6.4–8.2)
RBC # BLD AUTO: 4.6 M/UL (ref 4.1–5.7)
SODIUM SERPL-SCNC: 138 MMOL/L (ref 136–145)
T4 SERPL-MCNC: 9.5 UG/DL (ref 4.5–12.1)
TRIGL SERPL-MCNC: 105 MG/DL
TSH SERPL DL<=0.05 MIU/L-ACNC: 0.95 UIU/ML (ref 0.36–3.74)
VLDLC SERPL CALC-MCNC: 21 MG/DL
WBC # BLD AUTO: 6.4 K/UL (ref 4.1–11.1)

## 2024-05-18 LAB — T3 SERPL-MCNC: 101 NG/DL (ref 71–180)

## 2024-06-06 ENCOUNTER — OFFICE VISIT (OUTPATIENT)
Facility: CLINIC | Age: 84
End: 2024-06-06

## 2024-06-06 VITALS
WEIGHT: 174.6 LBS | TEMPERATURE: 98 F | SYSTOLIC BLOOD PRESSURE: 147 MMHG | HEART RATE: 72 BPM | HEIGHT: 71 IN | OXYGEN SATURATION: 94 % | DIASTOLIC BLOOD PRESSURE: 79 MMHG | RESPIRATION RATE: 16 BRPM | BODY MASS INDEX: 24.44 KG/M2

## 2024-06-06 DIAGNOSIS — J34.0 NASAL ABSCESS: Primary | ICD-10-CM

## 2024-06-06 RX ORDER — HYDROCHLOROTHIAZIDE 25 MG/1
25 TABLET ORAL DAILY
COMMUNITY
Start: 2024-05-20

## 2024-06-06 RX ORDER — DOXYCYCLINE HYCLATE 100 MG
100 TABLET ORAL 2 TIMES DAILY
Qty: 14 TABLET | Refills: 0 | Status: SHIPPED | OUTPATIENT
Start: 2024-06-06 | End: 2024-06-13

## 2024-06-06 ASSESSMENT — ENCOUNTER SYMPTOMS
EYES NEGATIVE: 1
SHORTNESS OF BREATH: 0
RESPIRATORY NEGATIVE: 1
GASTROINTESTINAL NEGATIVE: 1
COLOR CHANGE: 1

## 2024-06-06 NOTE — PROGRESS NOTES
Analsia Ugalde  Identified pt with two pt identifiers(name and ).     Chief Complaint   Patient presents with    Growth On Nose     Room 4A //        Reviewed record In preparation for visit and have obtained necessary documentation.     1. Have you been to the ER, urgent care clinic or hospitalized since your last visit? No     2. Have you seen or consulted any other health care providers outside of the [facility] since your last visit? Include any pap smears or colon screening. No    Patient has an advance directive.     Vitals reviewed with provider.    Health Maintenance reviewed:     There are no preventive care reminders to display for this patient.       Wt Readings from Last 3 Encounters:   24 79.4 kg (175 lb)   23 81.9 kg (180 lb 9.6 oz)   10/24/22 78.3 kg (172 lb 9.6 oz)        Temp Readings from Last 3 Encounters:   24 97.4 °F (36.3 °C) (Oral)   23 97.5 °F (36.4 °C) (Oral)        BP Readings from Last 3 Encounters:   24 130/62   23 122/80   10/24/22 126/76        Pulse Readings from Last 3 Encounters:   24 69   23 78   10/24/22 77             No data to display                  Learning Assessment:   :         2023     1:30 PM   [facility] AMB LEARNING ASSESSMENT   Primary Learner Patient   level of education DID NOT GRADUATE HIGH SCHOOL   Barriers Factors NONE   Primary Language ENGLISH   Learning Preference OTHER (COMMENT)   Answered By Patient   Relationship to Learner SELF         Fall Risk Assessment:         2024     1:13 PM 2023     1:18 PM 2022     3:17 PM 2022     1:16 PM 2022     7:00 PM 2022     3:22 PM 2022    12:12 PM   Amb Fall Risk Assessment and TUG Test   Do you feel unsteady or are you worried about falling?  yes no        2 or more falls in past year? no no        Fall with injury in past year? no no        Fall in past 12 months?   1 0      Able to walk?   Yes Yes      Total Score     1 1 1 
     Past Surgical History:   Procedure Laterality Date    CATARACT REMOVAL Right 2016    HEENT      nose polyps    ORTHOPEDIC SURGERY      L elbow surgery    PACEMAKER PLACEMENT N/A 2022        Family History   Problem Relation Age of Onset    Cancer Sister         Lung    Stroke Father     Cancer Brother         Lung    Heart Disease Brother         late 60    Hypertension Mother     Stroke Mother         Social History     Socioeconomic History    Marital status:      Spouse name: Not on file    Number of children: Not on file    Years of education: Not on file    Highest education level: Not on file   Occupational History    Not on file   Tobacco Use    Smoking status: Former     Current packs/day: 0.00     Types: Cigarettes     Quit date: 3/12/1988     Years since quittin.2    Smokeless tobacco: Never   Vaping Use    Vaping Use: Never used   Substance and Sexual Activity    Alcohol use: No    Drug use: Not Currently     Types: Prescription, Marijuana (Weed)    Sexual activity: Defer   Other Topics Concern    Not on file   Social History Narrative    Not on file     Social Determinants of Health     Financial Resource Strain: Low Risk  (2023)    Overall Financial Resource Strain (CARDIA)     Difficulty of Paying Living Expenses: Not hard at all   Food Insecurity: Not on file (2023)   Transportation Needs: Unknown (2023)    PRAPARE - Transportation     Lack of Transportation (Medical): Not on file     Lack of Transportation (Non-Medical): No   Physical Activity: Insufficiently Active (2024)    Exercise Vital Sign     Days of Exercise per Week: 7 days     Minutes of Exercise per Session: 20 min   Stress: Not on file   Social Connections: Not on file   Intimate Partner Violence: Not on file   Housing Stability: Unknown (2023)    Housing Stability Vital Sign     Unable to Pay for Housing in the Last Year: Not on file     Number of Places Lived in the Last Year: Not on

## 2024-06-24 RX ORDER — BENAZEPRIL HYDROCHLORIDE 40 MG/1
TABLET ORAL
Qty: 90 TABLET | Refills: 3 | Status: SHIPPED | OUTPATIENT
Start: 2024-06-24

## 2024-07-01 RX ORDER — LEVOTHYROXINE SODIUM 0.07 MG/1
TABLET ORAL
Qty: 90 TABLET | Refills: 3 | Status: SHIPPED | OUTPATIENT
Start: 2024-07-01

## 2024-07-01 NOTE — TELEPHONE ENCOUNTER
PCP: Rafa Gallegos PA-C     Last appt:  6/6/2024      Future Appointments   Date Time Provider Department Center   7/2/2024  2:00 PM Rafa Gallegos PA-C [facility] AMB   7/9/2024  1:30 PM Rafa Gallegos PA-C [facility] AMB          Requested Prescriptions     Pending Prescriptions Disp Refills    levothyroxine (SYNTHROID) 75 MCG tablet [Pharmacy Med Name: Levothyroxine Sodium 75 MCG Oral Tablet] 90 tablet 0     Sig: TAKE 1 TABLET BY MOUTH ONCE DAILY BEFORE BREAKFAST FOR THYROID

## 2024-07-02 ENCOUNTER — OFFICE VISIT (OUTPATIENT)
Facility: CLINIC | Age: 84
End: 2024-07-02
Payer: MEDICARE

## 2024-07-02 VITALS
HEIGHT: 71 IN | BODY MASS INDEX: 24.58 KG/M2 | WEIGHT: 175.6 LBS | SYSTOLIC BLOOD PRESSURE: 138 MMHG | OXYGEN SATURATION: 95 % | RESPIRATION RATE: 16 BRPM | DIASTOLIC BLOOD PRESSURE: 70 MMHG | HEART RATE: 75 BPM | TEMPERATURE: 97.8 F

## 2024-07-02 DIAGNOSIS — I10 ESSENTIAL HYPERTENSION: ICD-10-CM

## 2024-07-02 DIAGNOSIS — N18.32 STAGE 3B CHRONIC KIDNEY DISEASE (HCC): ICD-10-CM

## 2024-07-02 DIAGNOSIS — I48.20 CHRONIC ATRIAL FIBRILLATION (HCC): Primary | ICD-10-CM

## 2024-07-02 DIAGNOSIS — R73.02 GLUCOSE INTOLERANCE (IMPAIRED GLUCOSE TOLERANCE): ICD-10-CM

## 2024-07-02 DIAGNOSIS — Z95.0 PACEMAKER: ICD-10-CM

## 2024-07-02 DIAGNOSIS — E78.00 HYPERCHOLESTEROLEMIA: ICD-10-CM

## 2024-07-02 DIAGNOSIS — R73.03 PREDIABETES: ICD-10-CM

## 2024-07-02 DIAGNOSIS — E03.4 HYPOTHYROIDISM DUE TO ACQUIRED ATROPHY OF THYROID: ICD-10-CM

## 2024-07-02 PROCEDURE — 1036F TOBACCO NON-USER: CPT | Performed by: PHYSICIAN ASSISTANT

## 2024-07-02 PROCEDURE — 3078F DIAST BP <80 MM HG: CPT | Performed by: PHYSICIAN ASSISTANT

## 2024-07-02 PROCEDURE — G8427 DOCREV CUR MEDS BY ELIG CLIN: HCPCS | Performed by: PHYSICIAN ASSISTANT

## 2024-07-02 PROCEDURE — G8420 CALC BMI NORM PARAMETERS: HCPCS | Performed by: PHYSICIAN ASSISTANT

## 2024-07-02 PROCEDURE — 1123F ACP DISCUSS/DSCN MKR DOCD: CPT | Performed by: PHYSICIAN ASSISTANT

## 2024-07-02 PROCEDURE — 3075F SYST BP GE 130 - 139MM HG: CPT | Performed by: PHYSICIAN ASSISTANT

## 2024-07-02 PROCEDURE — 99214 OFFICE O/P EST MOD 30 MIN: CPT | Performed by: PHYSICIAN ASSISTANT

## 2024-07-02 RX ORDER — AMLODIPINE BESYLATE 5 MG/1
5 TABLET ORAL DAILY
Qty: 90 TABLET | Refills: 3
Start: 2024-07-02

## 2024-07-02 ASSESSMENT — ENCOUNTER SYMPTOMS
VOMITING: 0
DIARRHEA: 0
BLOOD IN STOOL: 0
SHORTNESS OF BREATH: 0
CONSTIPATION: 0
ABDOMINAL PAIN: 0
NAUSEA: 0

## 2024-07-02 NOTE — PROGRESS NOTES
\"Have you been to the ER, urgent care clinic since your last visit?  Hospitalized since your last visit?\"    NO    “Have you seen or consulted any other health care providers outside of [facility] since your last visit?”    NO            Click Here for Release of Records Request  
medication side effects and warnings with the patient as well.    Rafa Gallegos PA-C

## 2024-09-17 RX ORDER — FLECAINIDE ACETATE 100 MG/1
TABLET ORAL
Qty: 180 TABLET | Refills: 0 | OUTPATIENT
Start: 2024-09-17

## 2024-09-30 RX ORDER — AMLODIPINE BESYLATE 5 MG/1
5 TABLET ORAL DAILY
Qty: 90 TABLET | Refills: 3 | Status: SHIPPED | OUTPATIENT
Start: 2024-09-30

## 2024-09-30 NOTE — TELEPHONE ENCOUNTER
PCP: Rafa Gallegos PA-C     Last appt:  7/2/2024      Future Appointments   Date Time Provider Department Center   1/3/2025  1:00 PM Rafa Gallegos PA-C [facility] DEP          Requested Prescriptions     Pending Prescriptions Disp Refills    amLODIPine (NORVASC) 10 MG tablet [Pharmacy Med Name: amLODIPine Besylate 10 MG Oral Tablet] 90 tablet 0     Sig: Take 1 tablet by mouth once daily

## 2024-10-25 ENCOUNTER — TELEPHONE (OUTPATIENT)
Facility: CLINIC | Age: 84
End: 2024-10-25

## 2024-11-18 RX ORDER — LOVASTATIN 20 MG/1
TABLET ORAL
Qty: 90 TABLET | Refills: 3 | Status: SHIPPED | OUTPATIENT
Start: 2024-11-18

## 2024-11-18 NOTE — TELEPHONE ENCOUNTER
PCP: Rafa Gallegos PA-C     Last appt:  7/2/2024      Future Appointments   Date Time Provider Department Center   1/3/2025  1:00 PM Rafa Gallegos PA-C [facility] DEP          Requested Prescriptions     Pending Prescriptions Disp Refills    lovastatin (MEVACOR) 20 MG tablet [Pharmacy Med Name: Lovastatin 20 MG Oral Tablet] 90 tablet 0     Sig: Take 1 tablet by mouth nightly

## 2025-01-03 ENCOUNTER — OFFICE VISIT (OUTPATIENT)
Facility: CLINIC | Age: 85
End: 2025-01-03

## 2025-01-03 VITALS
SYSTOLIC BLOOD PRESSURE: 136 MMHG | DIASTOLIC BLOOD PRESSURE: 74 MMHG | RESPIRATION RATE: 16 BRPM | TEMPERATURE: 98 F | HEART RATE: 75 BPM | BODY MASS INDEX: 24.67 KG/M2 | HEIGHT: 71 IN | WEIGHT: 176.2 LBS

## 2025-01-03 DIAGNOSIS — Z00.00 ENCOUNTER FOR SUBSEQUENT ANNUAL WELLNESS VISIT (AWV) IN MEDICARE PATIENT: Primary | ICD-10-CM

## 2025-01-03 DIAGNOSIS — E78.2 MIXED HYPERLIPIDEMIA: ICD-10-CM

## 2025-01-03 DIAGNOSIS — R73.03 PREDIABETES: ICD-10-CM

## 2025-01-03 DIAGNOSIS — I48.20 CHRONIC ATRIAL FIBRILLATION (HCC): ICD-10-CM

## 2025-01-03 DIAGNOSIS — M1A.9XX0 CHRONIC GOUT WITHOUT TOPHUS, UNSPECIFIED CAUSE, UNSPECIFIED SITE: ICD-10-CM

## 2025-01-03 DIAGNOSIS — E03.4 HYPOTHYROIDISM DUE TO ACQUIRED ATROPHY OF THYROID: ICD-10-CM

## 2025-01-03 DIAGNOSIS — N18.32 STAGE 3B CHRONIC KIDNEY DISEASE (HCC): ICD-10-CM

## 2025-01-03 DIAGNOSIS — Z12.5 PROSTATE CANCER SCREENING: ICD-10-CM

## 2025-01-03 DIAGNOSIS — R73.02 GLUCOSE INTOLERANCE (IMPAIRED GLUCOSE TOLERANCE): ICD-10-CM

## 2025-01-03 DIAGNOSIS — E78.00 HYPERCHOLESTEROLEMIA: ICD-10-CM

## 2025-01-03 DIAGNOSIS — I10 ESSENTIAL HYPERTENSION: ICD-10-CM

## 2025-01-03 DIAGNOSIS — I49.5 SSS (SICK SINUS SYNDROME) (HCC): ICD-10-CM

## 2025-01-03 DIAGNOSIS — Z95.0 PACEMAKER: ICD-10-CM

## 2025-01-03 SDOH — ECONOMIC STABILITY: INCOME INSECURITY: HOW HARD IS IT FOR YOU TO PAY FOR THE VERY BASICS LIKE FOOD, HOUSING, MEDICAL CARE, AND HEATING?: NOT HARD AT ALL

## 2025-01-03 SDOH — ECONOMIC STABILITY: FOOD INSECURITY: WITHIN THE PAST 12 MONTHS, YOU WORRIED THAT YOUR FOOD WOULD RUN OUT BEFORE YOU GOT MONEY TO BUY MORE.: NEVER TRUE

## 2025-01-03 SDOH — ECONOMIC STABILITY: FOOD INSECURITY: WITHIN THE PAST 12 MONTHS, THE FOOD YOU BOUGHT JUST DIDN'T LAST AND YOU DIDN'T HAVE MONEY TO GET MORE.: NEVER TRUE

## 2025-01-03 ASSESSMENT — PATIENT HEALTH QUESTIONNAIRE - PHQ9
SUM OF ALL RESPONSES TO PHQ QUESTIONS 1-9: 0
SUM OF ALL RESPONSES TO PHQ9 QUESTIONS 1 & 2: 0
1. LITTLE INTEREST OR PLEASURE IN DOING THINGS: NOT AT ALL
SUM OF ALL RESPONSES TO PHQ QUESTIONS 1-9: 0
2. FEELING DOWN, DEPRESSED OR HOPELESS: NOT AT ALL

## 2025-01-03 NOTE — PROGRESS NOTES
\"Have you been to the ER, urgent care clinic since your last visit?  Hospitalized since your last visit?\"    NO    “Have you seen or consulted any other health care providers outside our system since your last visit?”    NO           
Capillary refill takes less than 2 seconds.      Findings: No rash.   Neurological:      General: No focal deficit present.      Mental Status: He is alert and oriented to person, place, and time. Mental status is at baseline.      Sensory: No sensory deficit.      Motor: No weakness.      Gait: Gait normal.   Psychiatric:         Mood and Affect: Mood normal.         Behavior: Behavior normal.         Thought Content: Thought content normal.                 Allergies   Allergen Reactions    Bee Venom Anaphylaxis    Penicillins Swelling     Prior to Visit Medications    Medication Sig Taking? Authorizing Provider   lovastatin (MEVACOR) 20 MG tablet Take 1 tablet by mouth nightly Yes Rafa Gallegos PA-C   amLODIPine (NORVASC) 5 MG tablet Take 1 tablet by mouth daily Yes Rafa Gallegos PA-C   levothyroxine (SYNTHROID) 75 MCG tablet TAKE 1 TABLET BY MOUTH ONCE DAILY BEFORE BREAKFAST FOR THYROID Yes Rafa Gallegos PA-C   benazepril (LOTENSIN) 40 MG tablet Take 1 tablet by mouth once daily Yes Rafa Gallegos PA-C   hydroCHLOROthiazide (HYDRODIURIL) 25 MG tablet Take 1 tablet by mouth daily Yes Provider, MD Stanley   flecainide (TAMBOCOR) 100 MG tablet TAKE 1 TABLET BY MOUTH IN THE MORNING AND 1 IN THE EVENING Yes Rafa Gallegos PA-C   metoprolol succinate (TOPROL XL) 25 MG extended release tablet Take 1 tablet by mouth once daily Yes Rafa Gallegos PA-C   allopurinol (ZYLOPRIM) 300 MG tablet Take 1 tablet by mouth once daily Yes Rafa Gallegos PA-C   aspirin 81 MG EC tablet Take by mouth daily Yes Automatic Reconciliation, Ar   traZODone (DESYREL) 50 MG tablet Take 1 tablet by mouth nightly as needed for Sleep  Patient not taking: Reported on 1/3/2025  Rafa Gallegos PA-C       CareTeam (Including outside providers/suppliers regularly involved in providing care):   Patient Care Team:  Rafa Gallegos PA-C as PCP - General (Physician Assistant)  Rafa Gallegos PA-C as

## 2025-01-05 RX ORDER — ALLOPURINOL 300 MG/1
TABLET ORAL
Qty: 90 TABLET | Refills: 3 | Status: SHIPPED | OUTPATIENT
Start: 2025-01-05

## 2025-03-24 RX ORDER — METOPROLOL SUCCINATE 25 MG/1
25 TABLET, EXTENDED RELEASE ORAL DAILY
Qty: 90 TABLET | Refills: 3 | Status: SHIPPED | OUTPATIENT
Start: 2025-03-24

## 2025-03-24 NOTE — TELEPHONE ENCOUNTER
PCP: Rafa Gallegos PA-C     Last appt:  1/3/2025      Future Appointments   Date Time Provider Department Center   7/8/2025  1:00 PM Rafa Gallegos PA-C [facility] DEP          Requested Prescriptions     Pending Prescriptions Disp Refills    metoprolol succinate (TOPROL XL) 25 MG extended release tablet [Pharmacy Med Name: Metoprolol Succinate ER 25 MG Oral Tablet Extended Release 24 Hour] 90 tablet 0     Sig: Take 1 tablet by mouth once daily

## 2025-04-21 DIAGNOSIS — R73.03 PREDIABETES: ICD-10-CM

## 2025-04-21 DIAGNOSIS — I10 ESSENTIAL HYPERTENSION: ICD-10-CM

## 2025-04-21 DIAGNOSIS — R73.02 GLUCOSE INTOLERANCE (IMPAIRED GLUCOSE TOLERANCE): ICD-10-CM

## 2025-04-21 DIAGNOSIS — E03.4 HYPOTHYROIDISM DUE TO ACQUIRED ATROPHY OF THYROID: ICD-10-CM

## 2025-04-21 DIAGNOSIS — E78.2 MIXED HYPERLIPIDEMIA: ICD-10-CM

## 2025-04-21 DIAGNOSIS — M1A.9XX0 CHRONIC GOUT WITHOUT TOPHUS, UNSPECIFIED CAUSE, UNSPECIFIED SITE: ICD-10-CM

## 2025-04-21 DIAGNOSIS — N18.32 STAGE 3B CHRONIC KIDNEY DISEASE (HCC): ICD-10-CM

## 2025-04-21 DIAGNOSIS — Z12.5 PROSTATE CANCER SCREENING: ICD-10-CM

## 2025-04-22 ENCOUNTER — RESULTS FOLLOW-UP (OUTPATIENT)
Facility: CLINIC | Age: 85
End: 2025-04-22

## 2025-04-22 LAB
ALBUMIN SERPL-MCNC: 3.6 G/DL (ref 3.5–5)
ALBUMIN/GLOB SERPL: 0.8 (ref 1.1–2.2)
ALP SERPL-CCNC: 84 U/L (ref 45–117)
ALT SERPL-CCNC: 20 U/L (ref 12–78)
ANION GAP SERPL CALC-SCNC: 4 MMOL/L (ref 2–12)
APPEARANCE UR: CLEAR
AST SERPL-CCNC: 20 U/L (ref 15–37)
BACTERIA URNS QL MICRO: NEGATIVE /HPF
BASOPHILS # BLD: 0.05 K/UL (ref 0–0.1)
BASOPHILS NFR BLD: 0.7 % (ref 0–1)
BILIRUB SERPL-MCNC: 0.3 MG/DL (ref 0.2–1)
BILIRUB UR QL: NEGATIVE
BUN SERPL-MCNC: 36 MG/DL (ref 6–20)
BUN/CREAT SERPL: 18 (ref 12–20)
CALCIUM SERPL-MCNC: 9.5 MG/DL (ref 8.5–10.1)
CHLORIDE SERPL-SCNC: 107 MMOL/L (ref 97–108)
CHOLEST SERPL-MCNC: 161 MG/DL
CO2 SERPL-SCNC: 26 MMOL/L (ref 21–32)
COLOR UR: ABNORMAL
CREAT SERPL-MCNC: 1.98 MG/DL (ref 0.7–1.3)
DIFFERENTIAL METHOD BLD: ABNORMAL
EOSINOPHIL # BLD: 0.64 K/UL (ref 0–0.4)
EOSINOPHIL NFR BLD: 9.6 % (ref 0–7)
EPITH CASTS URNS QL MICRO: ABNORMAL /LPF
ERYTHROCYTE [DISTWIDTH] IN BLOOD BY AUTOMATED COUNT: 14.1 % (ref 11.5–14.5)
EST. AVERAGE GLUCOSE BLD GHB EST-MCNC: 126 MG/DL
GLOBULIN SER CALC-MCNC: 4.3 G/DL (ref 2–4)
GLUCOSE SERPL-MCNC: 127 MG/DL (ref 65–100)
GLUCOSE UR STRIP.AUTO-MCNC: NEGATIVE MG/DL
HBA1C MFR BLD: 6 % (ref 4–5.6)
HCT VFR BLD AUTO: 41.1 % (ref 36.6–50.3)
HDLC SERPL-MCNC: 41 MG/DL
HDLC SERPL: 3.9 (ref 0–5)
HGB BLD-MCNC: 13.2 G/DL (ref 12.1–17)
HGB UR QL STRIP: NEGATIVE
HYALINE CASTS URNS QL MICRO: ABNORMAL /LPF (ref 0–5)
IMM GRANULOCYTES # BLD AUTO: 0.03 K/UL (ref 0–0.04)
IMM GRANULOCYTES NFR BLD AUTO: 0.4 % (ref 0–0.5)
KETONES UR QL STRIP.AUTO: NEGATIVE MG/DL
LDLC SERPL CALC-MCNC: 88.4 MG/DL (ref 0–100)
LEUKOCYTE ESTERASE UR QL STRIP.AUTO: ABNORMAL
LYMPHOCYTES # BLD: 1.18 K/UL (ref 0.8–3.5)
LYMPHOCYTES NFR BLD: 17.7 % (ref 12–49)
MCH RBC QN AUTO: 32.4 PG (ref 26–34)
MCHC RBC AUTO-ENTMCNC: 32.1 G/DL (ref 30–36.5)
MCV RBC AUTO: 100.7 FL (ref 80–99)
MONOCYTES # BLD: 0.49 K/UL (ref 0–1)
MONOCYTES NFR BLD: 7.3 % (ref 5–13)
NEUTS SEG # BLD: 4.29 K/UL (ref 1.8–8)
NEUTS SEG NFR BLD: 64.3 % (ref 32–75)
NITRITE UR QL STRIP.AUTO: NEGATIVE
NRBC # BLD: 0 K/UL (ref 0–0.01)
NRBC BLD-RTO: 0 PER 100 WBC
PH UR STRIP: 5 (ref 5–8)
PLATELET # BLD AUTO: 207 K/UL (ref 150–400)
PMV BLD AUTO: 11.4 FL (ref 8.9–12.9)
POTASSIUM SERPL-SCNC: 4.8 MMOL/L (ref 3.5–5.1)
PROT SERPL-MCNC: 7.9 G/DL (ref 6.4–8.2)
PROT UR STRIP-MCNC: 100 MG/DL
PSA SERPL-MCNC: 2.3 NG/ML (ref 0.01–4)
RBC # BLD AUTO: 4.08 M/UL (ref 4.1–5.7)
RBC #/AREA URNS HPF: ABNORMAL /HPF (ref 0–5)
SODIUM SERPL-SCNC: 137 MMOL/L (ref 136–145)
SP GR UR REFRACTOMETRY: 1.02 (ref 1–1.03)
T4 FREE SERPL-MCNC: 1.2 NG/DL (ref 0.8–1.5)
TRIGL SERPL-MCNC: 158 MG/DL
TSH SERPL DL<=0.05 MIU/L-ACNC: 0.55 UIU/ML (ref 0.36–3.74)
URATE SERPL-MCNC: 3.7 MG/DL (ref 3.5–7.2)
UROBILINOGEN UR QL STRIP.AUTO: 0.2 EU/DL (ref 0.2–1)
VLDLC SERPL CALC-MCNC: 31.6 MG/DL
WBC # BLD AUTO: 6.7 K/UL (ref 4.1–11.1)
WBC URNS QL MICRO: ABNORMAL /HPF (ref 0–4)

## 2025-06-16 RX ORDER — BENAZEPRIL HYDROCHLORIDE 40 MG/1
40 TABLET ORAL DAILY
Qty: 90 TABLET | Refills: 2 | Status: SHIPPED | OUTPATIENT
Start: 2025-06-16

## 2025-06-16 NOTE — TELEPHONE ENCOUNTER
Future Appointments:  Future Appointments   Date Time Provider Department Center   7/8/2025  1:00 PM Rafa Gallegos PA-C [facility] DEP        Last Appointment With Me:  1/3/2025     Requested Prescriptions     Pending Prescriptions Disp Refills    benazepril (LOTENSIN) 40 MG tablet [Pharmacy Med Name: Benazepril HCl 40 MG Oral Tablet] 90 tablet 0     Sig: Take 1 tablet by mouth once daily

## 2025-06-30 RX ORDER — LEVOTHYROXINE SODIUM 75 UG/1
TABLET ORAL
Qty: 90 TABLET | Refills: 3 | Status: SHIPPED | OUTPATIENT
Start: 2025-06-30

## 2025-06-30 NOTE — TELEPHONE ENCOUNTER
PCP: Rafa Gallegos PA-C     Last appt:  1/3/2025      Future Appointments   Date Time Provider Department Center   7/8/2025  1:00 PM Rafa Gallegos PA-C [facility] DEP          Requested Prescriptions     Pending Prescriptions Disp Refills    levothyroxine (SYNTHROID) 75 MCG tablet [Pharmacy Med Name: Levothyroxine Sodium 75 MCG Oral Tablet] 90 tablet 0     Sig: TAKE 1 TABLET BY MOUTH ONCE DAILY BEFORE BREAKFAST FOR THYROID

## 2025-07-05 SDOH — ECONOMIC STABILITY: INCOME INSECURITY: IN THE LAST 12 MONTHS, WAS THERE A TIME WHEN YOU WERE NOT ABLE TO PAY THE MORTGAGE OR RENT ON TIME?: NO

## 2025-07-05 SDOH — ECONOMIC STABILITY: FOOD INSECURITY: WITHIN THE PAST 12 MONTHS, THE FOOD YOU BOUGHT JUST DIDN'T LAST AND YOU DIDN'T HAVE MONEY TO GET MORE.: NEVER TRUE

## 2025-07-05 SDOH — ECONOMIC STABILITY: TRANSPORTATION INSECURITY
IN THE PAST 12 MONTHS, HAS THE LACK OF TRANSPORTATION KEPT YOU FROM MEDICAL APPOINTMENTS OR FROM GETTING MEDICATIONS?: NO

## 2025-07-05 SDOH — ECONOMIC STABILITY: FOOD INSECURITY: WITHIN THE PAST 12 MONTHS, YOU WORRIED THAT YOUR FOOD WOULD RUN OUT BEFORE YOU GOT MONEY TO BUY MORE.: NEVER TRUE

## 2025-07-05 SDOH — ECONOMIC STABILITY: TRANSPORTATION INSECURITY
IN THE PAST 12 MONTHS, HAS LACK OF TRANSPORTATION KEPT YOU FROM MEETINGS, WORK, OR FROM GETTING THINGS NEEDED FOR DAILY LIVING?: NO

## 2025-07-08 ENCOUNTER — OFFICE VISIT (OUTPATIENT)
Facility: CLINIC | Age: 85
End: 2025-07-08

## 2025-07-08 VITALS
OXYGEN SATURATION: 95 % | WEIGHT: 172.5 LBS | RESPIRATION RATE: 12 BRPM | BODY MASS INDEX: 24.15 KG/M2 | DIASTOLIC BLOOD PRESSURE: 61 MMHG | HEIGHT: 71 IN | HEART RATE: 75 BPM | SYSTOLIC BLOOD PRESSURE: 125 MMHG | TEMPERATURE: 98 F

## 2025-07-08 DIAGNOSIS — E03.4 HYPOTHYROIDISM DUE TO ACQUIRED ATROPHY OF THYROID: ICD-10-CM

## 2025-07-08 DIAGNOSIS — I48.20 CHRONIC ATRIAL FIBRILLATION (HCC): Primary | ICD-10-CM

## 2025-07-08 DIAGNOSIS — E78.00 HYPERCHOLESTEROLEMIA: ICD-10-CM

## 2025-07-08 DIAGNOSIS — I10 ESSENTIAL HYPERTENSION: ICD-10-CM

## 2025-07-08 DIAGNOSIS — N18.32 STAGE 3B CHRONIC KIDNEY DISEASE (HCC): ICD-10-CM

## 2025-07-08 DIAGNOSIS — R35.0 URINARY FREQUENCY: ICD-10-CM

## 2025-07-08 DIAGNOSIS — Z95.0 PACEMAKER: ICD-10-CM

## 2025-07-08 DIAGNOSIS — R73.02 GLUCOSE INTOLERANCE (IMPAIRED GLUCOSE TOLERANCE): ICD-10-CM

## 2025-07-08 DIAGNOSIS — I49.5 SSS (SICK SINUS SYNDROME) (HCC): ICD-10-CM

## 2025-07-08 LAB
BILIRUBIN, URINE, POC: NEGATIVE
BLOOD URINE, POC: NEGATIVE
GLUCOSE URINE, POC: NEGATIVE
KETONES, URINE, POC: NEGATIVE
LEUKOCYTE ESTERASE, URINE, POC: NORMAL
NITRITE, URINE, POC: NEGATIVE
PH, URINE, POC: 5.5 (ref 4.6–8)
PROTEIN,URINE, POC: 100
SPECIFIC GRAVITY, URINE, POC: 1.02 (ref 1–1.03)
URINALYSIS CLARITY, POC: CLEAR
URINALYSIS COLOR, POC: YELLOW
UROBILINOGEN, POC: NORMAL MG/DL

## 2025-07-08 RX ORDER — NITROFURANTOIN 25; 75 MG/1; MG/1
100 CAPSULE ORAL 2 TIMES DAILY
Qty: 20 CAPSULE | Refills: 0 | Status: SHIPPED | OUTPATIENT
Start: 2025-07-08 | End: 2025-07-18

## 2025-07-08 ASSESSMENT — ENCOUNTER SYMPTOMS
ABDOMINAL PAIN: 0
SHORTNESS OF BREATH: 0
CONSTIPATION: 0
DIARRHEA: 0
BLOOD IN STOOL: 0
NAUSEA: 0
VOMITING: 0

## 2025-07-08 NOTE — PROGRESS NOTES
Analisa Ugalde is a 84 y.o. year old male seen in clinic today for   Chief Complaint   Patient presents with    Hypertension    Cholesterol Problem    Urinary Frequency       he is here today to follow up for HTN, HLD  He has been having more urinary frequency. Denies any hematuria but notes some abnormal smell to the urine. He has had this for about a month.  Otherwise he is feeling well. His wife states he is stressed from caring for her more as she goes through ongoing cancer issues leading to more immobility due to her back.  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  He follows with Dr. Mckay for his heart with hx of pacemaker for SSS and a-fib.   His thyroid and labs were checked in April all normal except bump in creatinine from 1.4 to 1.9. Will recheck today    he specifically denies any CP, SOB, HA. Dizziness, fevers, chills, N/V/D, urinary symptoms or other bowel changes.    Current Outpatient Medications on File Prior to Visit   Medication Sig Dispense Refill    levothyroxine (SYNTHROID) 75 MCG tablet TAKE 1 TABLET BY MOUTH ONCE DAILY BEFORE BREAKFAST FOR THYROID 90 tablet 3    benazepril (LOTENSIN) 40 MG tablet Take 1 tablet by mouth once daily 90 tablet 2    metoprolol succinate (TOPROL XL) 25 MG extended release tablet Take 1 tablet by mouth once daily 90 tablet 3    allopurinol (ZYLOPRIM) 300 MG tablet Take 1 tablet by mouth once daily 90 tablet 3    lovastatin (MEVACOR) 20 MG tablet Take 1 tablet by mouth nightly 90 tablet 3    amLODIPine (NORVASC) 5 MG tablet Take 1 tablet by mouth daily 90 tablet 3    hydroCHLOROthiazide (HYDRODIURIL) 25 MG tablet Take 1 tablet by mouth daily      flecainide (TAMBOCOR) 100 MG tablet TAKE 1 TABLET BY MOUTH IN THE MORNING AND 1 IN THE EVENING 180 tablet 1    aspirin 81 MG EC tablet Take by mouth daily       No current facility-administered medications on

## 2025-07-09 LAB
ANION GAP SERPL CALC-SCNC: 6 MMOL/L (ref 2–12)
APPEARANCE UR: CLEAR
BACTERIA URNS QL MICRO: NEGATIVE /HPF
BILIRUB UR QL: NEGATIVE
BUN SERPL-MCNC: 29 MG/DL (ref 6–20)
BUN/CREAT SERPL: 15 (ref 12–20)
CALCIUM SERPL-MCNC: 9.6 MG/DL (ref 8.5–10.1)
CHLORIDE SERPL-SCNC: 106 MMOL/L (ref 97–108)
CO2 SERPL-SCNC: 24 MMOL/L (ref 21–32)
COLOR UR: ABNORMAL
CREAT SERPL-MCNC: 1.88 MG/DL (ref 0.7–1.3)
EPITH CASTS URNS QL MICRO: ABNORMAL /LPF
GLUCOSE SERPL-MCNC: 109 MG/DL (ref 65–100)
GLUCOSE UR STRIP.AUTO-MCNC: NEGATIVE MG/DL
HGB UR QL STRIP: NEGATIVE
HYALINE CASTS URNS QL MICRO: ABNORMAL /LPF (ref 0–5)
KETONES UR QL STRIP.AUTO: NEGATIVE MG/DL
LEUKOCYTE ESTERASE UR QL STRIP.AUTO: ABNORMAL
NITRITE UR QL STRIP.AUTO: NEGATIVE
PH UR STRIP: 5 (ref 5–8)
POTASSIUM SERPL-SCNC: 4.6 MMOL/L (ref 3.5–5.1)
PROT UR STRIP-MCNC: 100 MG/DL
RBC #/AREA URNS HPF: ABNORMAL /HPF (ref 0–5)
SODIUM SERPL-SCNC: 136 MMOL/L (ref 136–145)
SP GR UR REFRACTOMETRY: 1.01 (ref 1–1.03)
UROBILINOGEN UR QL STRIP.AUTO: 0.2 EU/DL (ref 0.2–1)
WBC URNS QL MICRO: ABNORMAL /HPF (ref 0–4)

## 2025-07-10 LAB
BACTERIA SPEC CULT: NORMAL
SERVICE CMNT-IMP: NORMAL

## (undated) DEVICE — Z INACTIVE NO USAGE TURNOVER KIT RM CLEANOP

## (undated) DEVICE — STERILE POLYISOPRENE POWDER-FREE SURGICAL GLOVES: Brand: PROTEXIS

## (undated) DEVICE — SUTURE VCRL RAPIDE 5-0 L18IN ABSRB UD L13MM P-3 3/8 CIR VR493

## (undated) DEVICE — ADHESIVE SKIN CLOSURE HI VISC MIC 0.5 CC PREMIERPRO EXOFIN

## (undated) DEVICE — SOLUTION IV 1000ML 0.9% SOD CHL

## (undated) DEVICE — BNDG ELAS COBAN 2INX5YD NS --

## (undated) DEVICE — (D)SYR 10ML 1/5ML GRAD NSAF -- PKGING CHANGE USE ITEM 338027

## (undated) DEVICE — SUTURE COAT VCRL SZ 4-0 L18IN ABSRB UD L19MM PS-2 1/2 CIR J496G

## (undated) DEVICE — TRAY,IRRIGATION,PISTON SYRINGE,60ML,STRL: Brand: MEDLINE

## (undated) DEVICE — KENDALL SCD EXPRESS SLEEVES, KNEE LENGTH, MEDIUM: Brand: KENDALL SCD

## (undated) DEVICE — REM POLYHESIVE ADULT PATIENT RETURN ELECTRODE: Brand: VALLEYLAB

## (undated) DEVICE — DERMACEA GAUZE FLUFF ROLL: Brand: DERMACEA

## (undated) DEVICE — Device

## (undated) DEVICE — SUT ETHLN 4-0 18IN PS2 BLK --

## (undated) DEVICE — INTRO PEELWY HEMVLV 7F 13CM -- SHRT PRELUDE SNAP

## (undated) DEVICE — BLADE OPHTH MINI BEAV SHRP --

## (undated) DEVICE — SUTURE VCRL 2-0 L27IN ABSRB UD PS-2 L19MM 1/2 CIR J428H

## (undated) DEVICE — SUT SLK 0 30IN SH BLK --

## (undated) DEVICE — (D)PREP SKN CHLRAPRP APPL 26ML -- CONVERT TO ITEM 371833

## (undated) DEVICE — OCCLUSIVE GAUZE STRIP,3% BISMUTH TRIBROMOPHENATE IN PETROLATUM BLEND: Brand: XEROFORM

## (undated) DEVICE — MEDI-TRACE CADENCE ADULT, DEFIBRILLATION ELECTRODE -RTS  (10 PR/PK) - PHYSIO-CONTROL: Brand: MEDI-TRACE CADENCE

## (undated) DEVICE — BIPOLAR FORCEPS CORD,BANANA LEADS: Brand: VALLEYLAB

## (undated) DEVICE — 3M™ IOBAN™ 2 ANTIMICROBIAL INCISE DRAPE 6650EZ: Brand: IOBAN™ 2

## (undated) DEVICE — ADHESIVE TISS CLOSURE 22X4 CM 4 CC HI VISC EXOFIN

## (undated) DEVICE — KIT ACCS INTRO 4FR L10CM NDL 21GA L7CM GWIRE L40CM

## (undated) DEVICE — SYR IRR BLB 2OZ DISP BLU STRL -- CONVERT TO ITEM 357637

## (undated) DEVICE — PACK PROC PACEMAKER  LF

## (undated) DEVICE — DRAPE,REIN 53X77,STERILE: Brand: MEDLINE

## (undated) DEVICE — TIBURON HAND DRAPE: Brand: CONVERTORS

## (undated) DEVICE — NEEDLE HYPO 25GA L1.5IN BVL ORIENTED ECLIPSE

## (undated) DEVICE — AIRLIFE™ ADULT CUSHION NASAL CANNULA 14 FOOT (4.3) CRUSH-RESISTANT OXYGEN TUBING, AND U/CONNECT-IT ADAPTER: Brand: AIRLIFE™